# Patient Record
Sex: FEMALE | Race: WHITE | Employment: OTHER | URBAN - METROPOLITAN AREA
[De-identification: names, ages, dates, MRNs, and addresses within clinical notes are randomized per-mention and may not be internally consistent; named-entity substitution may affect disease eponyms.]

---

## 2021-11-07 ENCOUNTER — HOSPITAL ENCOUNTER (INPATIENT)
Age: 71
LOS: 5 days | Discharge: NURSING, REHAB OR LONG-TERM CARE | DRG: 473 | End: 2021-11-12
Attending: STUDENT IN AN ORGANIZED HEALTH CARE EDUCATION/TRAINING PROGRAM | Admitting: STUDENT IN AN ORGANIZED HEALTH CARE EDUCATION/TRAINING PROGRAM
Payer: MEDICARE

## 2021-11-07 ENCOUNTER — APPOINTMENT (OUTPATIENT)
Dept: GENERAL RADIOLOGY | Age: 71
DRG: 473 | End: 2021-11-07
Payer: MEDICARE

## 2021-11-07 DIAGNOSIS — R29.898 WEAKNESS OF BOTH LOWER EXTREMITIES: Primary | ICD-10-CM

## 2021-11-07 DIAGNOSIS — R26.2 UNABLE TO AMBULATE: ICD-10-CM

## 2021-11-07 PROBLEM — R53.1 GENERALIZED WEAKNESS: Status: ACTIVE | Noted: 2021-11-07

## 2021-11-07 LAB
ANION GAP SERPL CALCULATED.3IONS-SCNC: 13 MMOL/L (ref 3–16)
BACTERIA: ABNORMAL /HPF
BASOPHILS ABSOLUTE: 0.1 K/UL (ref 0–0.2)
BASOPHILS RELATIVE PERCENT: 0.7 %
BILIRUBIN URINE: NEGATIVE
BLOOD, URINE: NEGATIVE
BUN BLDV-MCNC: 18 MG/DL (ref 7–20)
CALCIUM SERPL-MCNC: 10 MG/DL (ref 8.3–10.6)
CHLORIDE BLD-SCNC: 100 MMOL/L (ref 99–110)
CLARITY: ABNORMAL
CO2: 26 MMOL/L (ref 21–32)
COLOR: YELLOW
CREAT SERPL-MCNC: 0.8 MG/DL (ref 0.6–1.2)
EOSINOPHILS ABSOLUTE: 0.1 K/UL (ref 0–0.6)
EOSINOPHILS RELATIVE PERCENT: 1.3 %
EPITHELIAL CELLS, UA: 4 /HPF (ref 0–5)
GFR AFRICAN AMERICAN: >60
GFR NON-AFRICAN AMERICAN: >60
GLUCOSE BLD-MCNC: 93 MG/DL (ref 70–99)
GLUCOSE URINE: NEGATIVE MG/DL
HCT VFR BLD CALC: 37.9 % (ref 36–48)
HEMOGLOBIN: 12.5 G/DL (ref 12–16)
HYALINE CASTS: 0 /LPF (ref 0–8)
KETONES, URINE: NEGATIVE MG/DL
LEUKOCYTE ESTERASE, URINE: NEGATIVE
LYMPHOCYTES ABSOLUTE: 3.3 K/UL (ref 1–5.1)
LYMPHOCYTES RELATIVE PERCENT: 37.6 %
MCH RBC QN AUTO: 29.6 PG (ref 26–34)
MCHC RBC AUTO-ENTMCNC: 33 G/DL (ref 31–36)
MCV RBC AUTO: 89.8 FL (ref 80–100)
MICROSCOPIC EXAMINATION: YES
MONOCYTES ABSOLUTE: 0.7 K/UL (ref 0–1.3)
MONOCYTES RELATIVE PERCENT: 7.8 %
NEUTROPHILS ABSOLUTE: 4.6 K/UL (ref 1.7–7.7)
NEUTROPHILS RELATIVE PERCENT: 52.6 %
NITRITE, URINE: NEGATIVE
PDW BLD-RTO: 13.8 % (ref 12.4–15.4)
PH UA: 7.5 (ref 5–8)
PLATELET # BLD: 373 K/UL (ref 135–450)
PMV BLD AUTO: 7.5 FL (ref 5–10.5)
POTASSIUM REFLEX MAGNESIUM: 4.3 MMOL/L (ref 3.5–5.1)
PROTEIN UA: NEGATIVE MG/DL
RBC # BLD: 4.23 M/UL (ref 4–5.2)
RBC UA: 1 /HPF (ref 0–4)
SODIUM BLD-SCNC: 139 MMOL/L (ref 136–145)
SPECIFIC GRAVITY UA: 1.01 (ref 1–1.03)
TROPONIN: <0.01 NG/ML
URINE REFLEX TO CULTURE: ABNORMAL
URINE TYPE: ABNORMAL
UROBILINOGEN, URINE: 0.2 E.U./DL
WBC # BLD: 8.8 K/UL (ref 4–11)
WBC UA: 1 /HPF (ref 0–5)

## 2021-11-07 PROCEDURE — 84484 ASSAY OF TROPONIN QUANT: CPT

## 2021-11-07 PROCEDURE — 85025 COMPLETE CBC W/AUTO DIFF WBC: CPT

## 2021-11-07 PROCEDURE — 1200000000 HC SEMI PRIVATE

## 2021-11-07 PROCEDURE — 93005 ELECTROCARDIOGRAM TRACING: CPT | Performed by: PHYSICIAN ASSISTANT

## 2021-11-07 PROCEDURE — 81001 URINALYSIS AUTO W/SCOPE: CPT

## 2021-11-07 PROCEDURE — 99284 EMERGENCY DEPT VISIT MOD MDM: CPT

## 2021-11-07 PROCEDURE — 80048 BASIC METABOLIC PNL TOTAL CA: CPT

## 2021-11-07 PROCEDURE — 71045 X-RAY EXAM CHEST 1 VIEW: CPT

## 2021-11-07 RX ORDER — POLYETHYLENE GLYCOL 3350 17 G/17G
17 POWDER, FOR SOLUTION ORAL DAILY PRN
Status: DISCONTINUED | OUTPATIENT
Start: 2021-11-07 | End: 2021-11-12

## 2021-11-07 RX ORDER — FENOFIBRIC ACID 135 MG/1
135 CAPSULE, DELAYED RELEASE ORAL DAILY
COMMUNITY
Start: 2021-09-28

## 2021-11-07 RX ORDER — DULOXETIN HYDROCHLORIDE 60 MG/1
60 CAPSULE, DELAYED RELEASE ORAL DAILY
Status: DISCONTINUED | OUTPATIENT
Start: 2021-11-08 | End: 2021-11-12 | Stop reason: HOSPADM

## 2021-11-07 RX ORDER — M-VIT,TX,IRON,MINS/CALC/FOLIC 27MG-0.4MG
1 TABLET ORAL DAILY
COMMUNITY

## 2021-11-07 RX ORDER — OXYBUTYNIN CHLORIDE 5 MG/1
10 TABLET ORAL NIGHTLY
Status: DISCONTINUED | OUTPATIENT
Start: 2021-11-08 | End: 2021-11-12 | Stop reason: HOSPADM

## 2021-11-07 RX ORDER — FENOFIBRATE 160 MG/1
160 TABLET ORAL DAILY
Status: DISCONTINUED | OUTPATIENT
Start: 2021-11-08 | End: 2021-11-12 | Stop reason: HOSPADM

## 2021-11-07 RX ORDER — RISPERIDONE 1 MG/1
1 TABLET, FILM COATED ORAL 2 TIMES DAILY
Status: DISCONTINUED | OUTPATIENT
Start: 2021-11-08 | End: 2021-11-12 | Stop reason: HOSPADM

## 2021-11-07 RX ORDER — ONDANSETRON 4 MG/1
4 TABLET, ORALLY DISINTEGRATING ORAL EVERY 8 HOURS PRN
Status: DISCONTINUED | OUTPATIENT
Start: 2021-11-07 | End: 2021-11-10 | Stop reason: SDUPTHER

## 2021-11-07 RX ORDER — CITALOPRAM 20 MG/1
20 TABLET ORAL DAILY
COMMUNITY

## 2021-11-07 RX ORDER — ONDANSETRON 2 MG/ML
4 INJECTION INTRAMUSCULAR; INTRAVENOUS EVERY 6 HOURS PRN
Status: DISCONTINUED | OUTPATIENT
Start: 2021-11-07 | End: 2021-11-10

## 2021-11-07 RX ORDER — SODIUM CHLORIDE 9 MG/ML
25 INJECTION, SOLUTION INTRAVENOUS PRN
Status: DISCONTINUED | OUTPATIENT
Start: 2021-11-07 | End: 2021-11-10 | Stop reason: SDUPTHER

## 2021-11-07 RX ORDER — SODIUM CHLORIDE 0.9 % (FLUSH) 0.9 %
5-40 SYRINGE (ML) INJECTION EVERY 12 HOURS SCHEDULED
Status: DISCONTINUED | OUTPATIENT
Start: 2021-11-08 | End: 2021-11-10 | Stop reason: SDUPTHER

## 2021-11-07 RX ORDER — RISPERIDONE 1 MG/1
1 TABLET, FILM COATED ORAL 2 TIMES DAILY
COMMUNITY

## 2021-11-07 RX ORDER — CITALOPRAM 10 MG/1
20 TABLET ORAL DAILY
Status: DISCONTINUED | OUTPATIENT
Start: 2021-11-08 | End: 2021-11-12 | Stop reason: HOSPADM

## 2021-11-07 RX ORDER — ACETAMINOPHEN 650 MG/1
650 SUPPOSITORY RECTAL EVERY 6 HOURS PRN
Status: DISCONTINUED | OUTPATIENT
Start: 2021-11-07 | End: 2021-11-12 | Stop reason: HOSPADM

## 2021-11-07 RX ORDER — OXYBUTYNIN CHLORIDE 5 MG/1
10 TABLET ORAL NIGHTLY
COMMUNITY
Start: 2021-07-27

## 2021-11-07 RX ORDER — ACETAMINOPHEN 325 MG/1
650 TABLET ORAL EVERY 6 HOURS PRN
Status: DISCONTINUED | OUTPATIENT
Start: 2021-11-07 | End: 2021-11-12 | Stop reason: HOSPADM

## 2021-11-07 RX ORDER — DOCUSATE SODIUM 100 MG/1
100 CAPSULE, LIQUID FILLED ORAL 2 TIMES DAILY
COMMUNITY

## 2021-11-07 RX ORDER — SODIUM CHLORIDE 0.9 % (FLUSH) 0.9 %
5-40 SYRINGE (ML) INJECTION PRN
Status: DISCONTINUED | OUTPATIENT
Start: 2021-11-07 | End: 2021-11-10 | Stop reason: SDUPTHER

## 2021-11-07 RX ORDER — DULOXETIN HYDROCHLORIDE 60 MG/1
60 CAPSULE, DELAYED RELEASE ORAL DAILY
COMMUNITY

## 2021-11-07 ASSESSMENT — ENCOUNTER SYMPTOMS
BACK PAIN: 0
SORE THROAT: 0
CHOKING: 0
SHORTNESS OF BREATH: 0
VOMITING: 0
EYE REDNESS: 0
APNEA: 0
ABDOMINAL PAIN: 0
FACIAL SWELLING: 0
EYE DISCHARGE: 0
NAUSEA: 0

## 2021-11-07 ASSESSMENT — PAIN SCALES - GENERAL: PAINLEVEL_OUTOF10: 0

## 2021-11-08 LAB
EKG ATRIAL RATE: 72 BPM
EKG DIAGNOSIS: NORMAL
EKG P AXIS: 77 DEGREES
EKG P-R INTERVAL: 148 MS
EKG Q-T INTERVAL: 390 MS
EKG QRS DURATION: 70 MS
EKG QTC CALCULATION (BAZETT): 427 MS
EKG R AXIS: 23 DEGREES
EKG T AXIS: 54 DEGREES
EKG VENTRICULAR RATE: 72 BPM

## 2021-11-08 PROCEDURE — 93010 ELECTROCARDIOGRAM REPORT: CPT | Performed by: INTERNAL MEDICINE

## 2021-11-08 PROCEDURE — 97530 THERAPEUTIC ACTIVITIES: CPT

## 2021-11-08 PROCEDURE — 6370000000 HC RX 637 (ALT 250 FOR IP): Performed by: NURSE PRACTITIONER

## 2021-11-08 PROCEDURE — 97166 OT EVAL MOD COMPLEX 45 MIN: CPT

## 2021-11-08 PROCEDURE — 2580000003 HC RX 258: Performed by: NURSE PRACTITIONER

## 2021-11-08 PROCEDURE — 6360000002 HC RX W HCPCS: Performed by: NURSE PRACTITIONER

## 2021-11-08 PROCEDURE — 97162 PT EVAL MOD COMPLEX 30 MIN: CPT

## 2021-11-08 PROCEDURE — 1200000000 HC SEMI PRIVATE

## 2021-11-08 RX ADMIN — ENOXAPARIN SODIUM 40 MG: 100 INJECTION SUBCUTANEOUS at 16:49

## 2021-11-08 RX ADMIN — OXYBUTYNIN CHLORIDE 10 MG: 5 TABLET ORAL at 20:43

## 2021-11-08 RX ADMIN — SODIUM CHLORIDE, PRESERVATIVE FREE 10 ML: 5 INJECTION INTRAVENOUS at 20:44

## 2021-11-08 RX ADMIN — SODIUM CHLORIDE, PRESERVATIVE FREE 10 ML: 5 INJECTION INTRAVENOUS at 16:49

## 2021-11-08 RX ADMIN — OXYBUTYNIN CHLORIDE 10 MG: 5 TABLET ORAL at 00:20

## 2021-11-08 ASSESSMENT — PAIN SCALES - GENERAL
PAINLEVEL_OUTOF10: 0
PAINLEVEL_OUTOF10: 0

## 2021-11-08 NOTE — ED TRIAGE NOTES
Increasing weakness over last few weeks family brought her in to see if they could get her into rehabilitation center to assist with weakness

## 2021-11-08 NOTE — FLOWSHEET NOTE
4 Eyes Skin Assessment     NAME:  Elidia Mckinney  YOB: 1950  MEDICAL RECORD NUMBER:  [id number]    The patient is being assess for  Admission    I agree that 2 RN's have performed a thorough Head to Toe Skin Assessment on the patient. ALL assessment sites listed below have been assessed. Areas assessed by both nurses:    Head, Face, Ears, Shoulders, Back, Chest, Arms, Elbows, Hands, Sacrum. Buttock, Coccyx, Ischium and Legs. Feet and Heels        Does the Patient have a Wound?  No noted wound(s)       Nicholas Prevention initiated:  Yes   Wound Care Orders initiated:  NA    Pressure Injury (Stage 3,4, Unstageable, DTI, NWPT, and Complex wounds) if present place consult order under [de-identified] NA    New and Established Ostomies if present place consult order under : NA      Nurse 1 eSignature: Electronically signed by Tod Cui RN on 11/8/21 at 6:04 PM EST    **SHARE this note so that the co-signing nurse is able to place an eSignature**    Nurse 2 eSignature: Electronically signed by Neeraj Smith RN on 11/8/21 at 6:05 PM EST

## 2021-11-08 NOTE — PROGRESS NOTES
Patient Diagnosis(es): The primary encounter diagnosis was Weakness of both lower extremities. A diagnosis of Unable to ambulate was also pertinent to this visit. has a past medical history of Depression, Hyperlipidemia, and Hypertension. has no past surgical history on file. Restrictions  Restrictions/Precautions  Restrictions/Precautions: Fall Risk  Position Activity Restriction  Other position/activity restrictions: Hx of CP     Vision/Hearing  Vision: Impaired  Vision Exceptions: Wears glasses at all times  Hearing: Exceptions to [facility]  Hearing Exceptions: Hard of hearing/hearing concerns; Bilateral hearing aid       Subjective  General  Chart Reviewed: Yes  Patient assessed for rehabilitation services?: Yes  Additional Pertinent Hx: Pt is a 73 y.o. female with hx of CP who presented to the ED on 11/7/21 with progressive generalized weakness and debility.   Response To Previous Treatment: Not applicable  Family / Caregiver Present: No  Referring Practitioner: CASTILLO Rosario CNP  Referral Date : 11/08/21  Diagnosis: Generalized weakness  Follows Commands: Within Functional Limits  Subjective  Subjective: Pt is agreeable to PT          Orientation  Orientation  Overall Orientation Status: Within Functional Limits     Social/Functional History  Social/Functional History  Lives With:  (sister and NICK)  Type of Home: House  Home Layout: Two level, Bed/Bath upstairs, 1/2 bath on main level (2 + basement)  Home Access: Stairs to enter without rails  Entrance Stairs - Number of Steps: 1 thru garage + flight upstairs to bedroom with one rail  Bathroom Shower/Tub: Walk-in shower, Shower chair with back (showers in basement)  Bathroom Toilet: Standard (uses towel bar to stand sometimes)  Bathroom Equipment: Shower chair  Bathroom Accessibility: Accessible  Home Equipment: Rolling walker, [employer]  ADL Assistance: Independent  Homemaking Assistance:  (microwave meals, family assist with heaving cleaning and laundry)  Ambulation Assistance: Independent (RW in home, w/c in community (sister pushes w/c))  Transfer Assistance: Independent  Additional Comments: Sister works during the day and pt home alone.  Pt denies falls     Cognition   Cognition  Overall Cognitive Status: WFL    Objective  AROM RLE (degrees)  RLE AROM: Exceptions  RLE General AROM: ankles unable to obtain neutral position  AROM LLE (degrees)  LLE AROM : Exceptions  LLE General AROM: ankles unable to obtain neutral position  Strength RLE  Strength RLE: Exception  Comment: mild to moderate generalized weakness  Strength LLE  Strength LLE: Exception  Comment: mild to moderate generalized weakness  Motor Control  Gross Motor?: WFL     Bed mobility  Supine to Sit: Moderate assistance (for trunk)  Sit to Supine: Maximum assistance  Transfers  Sit to Stand: Minimal Assistance  Stand to sit: Minimal Assistance  Comment: from EOB and chair  Ambulation  Ambulation?: Yes  Ambulation 1  Surface: level tile  Device: Rolling Walker  Assistance: Minimal assistance  Gait Deviations: Slow Patsy; Decreased step length; Decreased step height  Distance: 5' x 2  Comments: Pt reporting max distance  Stairs/Curb  Stairs?: No     Balance  Posture: Fair  Sitting - Static: Good  Sitting - Dynamic: Good  Standing - Static: Fair (@RW)  Standing - Dynamic: Fair (@RW)        Plan   Plan  Times per week: 3-5x/week  Current Treatment Recommendations: Strengthening, ADL/Self-care Training, ROM, Balance Training, Functional Mobility Training, Transfer Training, Neuromuscular Re-education, Gait Training, Patient/Caregiver Education & Training, Equipment Evaluation, Education, & procurement, Safety Education & Training  Safety Devices  Type of devices: Call light within reach, Gait belt, Left in bed (pt in ED stretcher)      AM-PAC Score  AM-PAC Inpatient Mobility Raw Score : 12 (11/08/21 1215)  AM-PAC Inpatient T-Scale Score : 35.33 (11/08/21 1215)  Mobility Inpatient CMS 0-100% Score: 68.66 (11/08/21 1215)  Mobility Inpatient CMS G-Code Modifier : CL (11/08/21 1215)          Goals  Short term goals  Time Frame for Short term goals: by acute discharge  Short term goal 1: bed mobility with min A  Short term goal 2: sit<>Stand with CGA  Short term goal 3: ambulate > 20' with RW and SBA  Patient Goals   Patient goals : none stated       Therapy Time   Individual Concurrent Group Co-treatment   Time In 1135         Time Out 1215         Minutes 40         Timed Code Treatment Minutes: 25 Minutes       Terrie Patel, PT

## 2021-11-08 NOTE — ACP (ADVANCE CARE PLANNING)
Advance Care Planning     Advance Care Planning Activator (Inpatient)  Conversation Note      Date of ACP Conversation: 11/8/2021     Conversation Conducted with: Patient with Decision Making Capacity    ACP Activator: [address] [facility] Decision Maker:     Current Designated Health Care Decision Maker:     Primary Decision Maker: Eric Rosales - Brother/Sister - [phone]    Today we documented Decision Maker(s) consistent with Legal Next of Kin hierarchy. Care Preferences    Ventilation: \"If you were in your present state of health and suddenly became very ill and were unable to breathe on your own, what would your preference be about the use of a ventilator (breathing machine) if it were available to you? \"      Would the patient desire the use of ventilator (breathing machine)?: yes    \"If your health worsens and it becomes clear that your chance of recovery is unlikely, what would your preference be about the use of a ventilator (breathing machine) if it were available to you? \"     Would the patient desire the use of ventilator (breathing machine)?: No      Resuscitation  \"CPR works best to restart the heart when there is a sudden event, like a heart attack, in someone who is otherwise healthy. Unfortunately, CPR does not typically restart the heart for people who have serious health conditions or who are very sick. \"    \"In the event your heart stopped as a result of an underlying serious health condition, would you want attempts to be made to restart your heart (answer \"yes\" for attempt to resuscitate) or would you prefer a natural death (answer \"no\" for do not attempt to resuscitate)? \" yes       [x] Yes   [] No   Educated Patient / Mayank Sher regarding differences between Advance Directives and portable DNR orders.     Length of ACP Conversation in minutes:  5 minutes    Conversation Outcomes:  [x] ACP discussion completed  [] Existing advance directive reviewed with patient; no changes to patient's previously recorded wishes  [] New Advance Directive completed  [] Portable Do Not Rescitate prepared for Provider review and signature  [] POLST/POST/MOLST/MOST prepared for Provider review and signature      Follow-up plan:    [] Schedule follow-up conversation to continue planning  [] Referred individual to Provider for additional questions/concerns   [] Advised patient/agent/surrogate to review completed ACP document and update if needed with changes in condition, patient preferences or care setting    [x] This note routed to one or more involved healthcare providers

## 2021-11-08 NOTE — CARE COORDINATION
INITIAL CASE MANAGEMENT ASSESSMENT    Reviewed chart, met with patient to assess possible discharge needs. Explained Case Management role/services. Living Situation: Patient lives in a house with her sister Josiah Hooker and her brother in law. Patient mostly enters through the garage. Here is 1 [address]. ADLs: [place] varies- Patient can dress herself, and make food in the microwave. She is unable to do housework. DME: [facility] and Wheelchair    PT/OT Recs: TBD     Active Services: None     Transportation: Non active - Patients sisters, brother, or brother in law helps transport when needed. Medical transportation will be needed when medically ready. Medications: [facility] order. [facility] or [facility] for short term prescriptions. PCP: Li Garza MD      HD/PD: N/A    PLAN/COMMENTS: Patient is to go to [facility] rehab and then back home once her rehab stay is complete. The Plan for Transition of Care is related to the following treatment goals: [facility]- then go home once completed. The Patient was provided with a choice of provider and agrees   with the discharge plan. [x] Yes [] No    Freedom of choice list was provided with basic dialogue that supports the patient's individualized plan of care/goals, treatment preferences and shares the quality data associated with the providers. [x] Yes [] No    SW/CM provided contact information for patient or family to call with any questions. SW/CM will follow and assist as needed.     ACP Complete

## 2021-11-08 NOTE — ED PROVIDER NOTES
**ADVANCED PRACTICE PROVIDER, I HAVE EVALUATED THIS PATIENT**        [address] [facility] ENCOUNTER      Pt Name: Zoe Teran  VTD:[id number]  Yaniragfemilie 1950  Date of evaluation: 11/7/2021  Provider: Helene Kayser, PA-C      Chief Complaint:    Chief Complaint   Patient presents with    Fatigue     generalized weakness for the past week, states cannot get up to walk anymore with her walker, pt does have CP.  was recommended per PCP for inpatient therapy, they spoke with [facility] nursing and was told she needs a 3 nights admission prior to in pt admission d/t insurance          Nursing Notes, Past Medical Hx, Past Surgical Hx, Social Hx, Allergies, and Family Hx were all reviewed and agreed with or any disagreements were addressed in the HPI.    HPI: (Location, Duration, Timing, Severity, Quality, Assoc Sx, Context, Modifying factors)    This is a  73 y.o. female who presents to the emergency room with chief complaint of generalized weakness. Patient states in the last week she just been getting weaker and weaker. To the point she cannot get herself out of a chair. Person that helps her said that she is so weak that she cannot help her get into bed anymore. She did talk to the primary care physician who states that she will probably have to be placed in a skilled nursing home. The caretaker that is with her said they tried to call [address] [place] [address] and they told her that she went to come to the emergency room and be admitted for 3 days. And that is why she is here today. Patient denies fall. Denies chest pain, no abdominal pain. Denies pain with urination. No headaches. No fevers. No cough. No other complaints. PastMedical/Surgical History:      Diagnosis Date    Depression     Hyperlipidemia     Hypertension      No past surgical history on file.     Medications:  Previous Medications    No medications on file         Review of Systems:  (2-9 systems needed)  Review of Systems   Constitutional: Positive for fatigue. Negative for chills and fever. HENT: Negative for congestion, facial swelling and sore throat. Eyes: Negative for discharge and redness. Respiratory: Negative for apnea, choking and shortness of breath. Cardiovascular: Negative for chest pain. Gastrointestinal: Negative for abdominal pain, nausea and vomiting. Genitourinary: Negative for dysuria. Musculoskeletal: Negative for back pain, neck pain and neck stiffness. Neurological: Positive for weakness. Negative for dizziness, tremors, seizures and headaches. All other systems reviewed and are negative. \"Positives and Pertinent negatives as per HPI\"    Physical Exam:  Physical Exam  Constitutional:       Appearance: She is well-developed. She is not diaphoretic. HENT:      Head: Normocephalic and atraumatic. Nose: Nose normal.      Mouth/Throat:      Mouth: Mucous membranes are moist.      Pharynx: Oropharynx is clear. Eyes:      General:         Right eye: No discharge. Left eye: No discharge. Extraocular Movements: Extraocular movements intact. Conjunctiva/sclera: Conjunctivae normal.      Pupils: Pupils are equal, round, and reactive to light. Cardiovascular:      Rate and Rhythm: Normal rate and regular rhythm. Heart sounds: Normal heart sounds. No murmur heard. No friction rub. No gallop. Pulmonary:      Effort: Pulmonary effort is normal. No respiratory distress. Breath sounds: Normal breath sounds. No wheezing or rales. Chest:      Chest wall: No tenderness. Abdominal:      General: Abdomen is flat. Bowel sounds are normal. There is no distension. Palpations: Abdomen is soft. There is no mass. Tenderness: There is no abdominal tenderness. There is no guarding or rebound. Musculoskeletal:         General: Normal range of motion. Cervical back: Normal range of motion and neck supple. Skin:     General: Skin is warm and dry. Neurological:      General: No focal deficit present. Mental Status: She is alert and oriented to person, place, and time. Psychiatric:         Behavior: Behavior normal.         MEDICAL DECISION MAKING    Vitals:    Vitals:    11/07/21 1636 11/07/21 2000 11/07/21 2100   BP: 133/74 (!) 175/81    Pulse: 77     Resp: 17     Temp: 96.9 °F (36.1 °C)     SpO2: 100% 96% 97%       LABS:  Labs Reviewed   URINE RT REFLEX TO CULTURE - Abnormal; Notable for the following components:       Result Value    Clarity, UA CLOUDY (*)     All other components within normal limits    Narrative:     Performed at:  [place] [address]   Phone [phone]   MICROSCOPIC URINALYSIS - Abnormal; Notable for the following components:    Bacteria, UA 1+ (*)     All other components within normal limits    Narrative:     Performed at:  [address] [address]   Phone [phone]   CBC WITH AUTO DIFFERENTIAL    Narrative:     Performed at:  [address] [address]   Phone [phone]   BASIC METABOLIC PANEL W/ REFLEX TO MG FOR LOW K    Narrative:     Performed at:  [address] [address]   Phone [phone]   TROPONIN    Narrative:     Performed at:  [facility]  [address] [place] [address]   Phone [phone] of labs reviewed and were negative at this time or not returned at the time of this note.     RADIOLOGY:   Non-plain film images such as CT, Ultrasound and MRI are read by the radiologist. Jeffrey Muhammad PA-C have directly visualized the radiologic plain film image(s) with the below findings:      Interpretation per the Radiologist below, if available at the time of this note:    XR CHEST PORTABLE   Final Result   No airspace disease by radiograph. XR CHEST PORTABLE    Result Date: 11/7/2021  EXAMINATION: ONE XRAY VIEW OF THE CHEST 11/7/2021 8:10 pm COMPARISON: None. HISTORY: ORDERING SYSTEM PROVIDED HISTORY: Weakness TECHNOLOGIST PROVIDED HISTORY: Reason for exam:->Weakness Reason for Exam: weakness; fatigue Acuity: Acute Type of Exam: Initial FINDINGS: Cardiac silhouette is within normal limits in size. Mediastinal contours are otherwise suboptimally evaluated due to rotated projection. Lungs demonstrate no focal consolidation or pleural effusion. No pneumothorax appreciated on this projection. Scattered atelectasis noted. No airspace disease by radiograph. MEDICAL DECISION MAKING / ED COURSE:      PROCEDURES:   Procedures    None    Patient was given:  Medications - No data to display    Emergency room course: Patient on exam pupils are equal round and reactive to light extraocular movement is intact. Throat is clear. She has no facial drooping. No slurred speech. Tongue and smile does not deviate. Neck is supple full range of motion without tenderness. No midline tenderness cervical, thoracic or lumbar spine. Cardiovascular regular rhythm, lungs are clear. No wheeze rales or rhonchi noted. No chest wall tenderness with palpation. Abdomen is soft nontender. Nondistended. Normal bowel sounds all 4 quadrant. Pelvic stable anterior lateral compression. She has good strength against resisted plantar dorsiflexion.  strength 5+ equal bilaterally. Alert oriented x4. Does not appear to be in acute distress. Lab results from today shows:  Urinalysis negative leukocytes, negative for nitrates, negative for blood, negative ketones. Microscopically unremarkab. CBC within normal limits with a white count of 8.8. BMP unremarkable. Troponin less than 0.01. Chest x-ray shows no airspace disease.     Discussed lab results and chest x-ray results with patient. Discussed admission plan. I will place a call out to our hospitalist service for admission through [facility]. The patient tolerated their visit well. I evaluated the patient. The physician was available for consultation as needed. The patient and / or the family were informed of the results of any tests, a time was given to answer questions, a plan was proposed and they agreed with plan. CLINICAL IMPRESSION:  1. Weakness of both lower extremities    2. Unable to ambulate        DISPOSITION  DISPOSITION Decision To Admit 11/07/2021 10:10:59 PM        PATIENT REFERRED TO:  No follow-up provider specified.     DISCHARGE MEDICATIONS:  New Prescriptions    No medications on file       DISCONTINUED MEDICATIONS:  Discontinued Medications    No medications on file              (Please note the MDM and HPI sections of this note were completed with a voice recognition program.  Efforts were made to edit the dictations but occasionally words are mis-transcribed.)    Electronically signed, Priya Stoll PA-C,          Priya Stoll PA-C  11/07/21 1234

## 2021-11-08 NOTE — ED PROVIDER NOTES
EKG Interpretation    Interpreted by emergency department physician  Time performed: 1951  Time read: 1953    Rhythm: Sinus  Ventricular Rate: 72  QRS Axis: 23  Ectopy: None  Conduction: Normal sinus rhythm  ST Segments: normal  T Waves: normal  Q Waves: None    Other findings: Motion artifact make it difficult to read EKG    Compared to EKG on: None to compare    Clinical Impression: Normal sinus rhythm, normal EKG. There is motion artifact make it difficult to read EKG. There is no old EKG to compare.     [address], [facility], [facility]  11/07/21 2021

## 2021-11-08 NOTE — ED NOTES
Bed: C-21  Expected date: 11/7/21  Expected time: 7:05 PM  Means of arrival: Walk In  Comments:  Aleshia Guillaume RN  11/07/21 3744

## 2021-11-08 NOTE — CONSULTS
Neurology Consult Note  Reason for Consult: lower extremity weakness, new onset    Chief complaint: \"weakness\"    Dr Carson Urruita MD asked me to see Dimitri Prieto in consultation for evaluation of lower extremity weakness, new onset    History of Present Illness:  I obtained my information via the patient, supplemented with chart review. Dimitri Prieto is a 73 y.o. female with hx of cerebral palsy who presented to the ED on 11/7/21 for evaluation of generalized weakness. Per my encounter the patient reports that for the last week she has had persistent not entirely sure if progressive or sudden weakness as she gives conflicting answers. At baseline she is able to walk very slowly with a walker. She reports her weakness is most noticable when trying to go from sitting to standing as she does not have enough strength in her arms nor legs to push herself up off of the chair. She denies any falls. She reports chronic left sided weakness 2/2 her cerebral palsy and she broke her RLE 2.5 years ago with residual weakness. Nothing seems to make her symptoms better or worse. Denies any fatigue, fevers, chills. She reports good appetite. Denies any vision changes, difficulty swallowing, speech changes. She reports full sensation of her arms and legs. No neck, or back pain. No headache. No shortness of breath. Nursing had reported increased tone as possibly new. Per patient has had some chronic increased tone. On chart review the patients sister for which whom she lives with had reported that the patients PCP had recommend inpatient rehab. They were working with [address] [place] [address] for which there were reports that she needed 3 nights hospital admission prior to admission due to insurance difficulty. Today the patient reports her weakness is the same. While working with  therapy today  she was unable to lift herself up off of the mattress as they were changing the sheets.      No recent medication changes. She has not had weakness like this before. On chart review patient was recently seen by her PCP for which urine dip was concerning for bacterial UTI with positive nitrates and small leukocyte esterase, elevated CRP and ESR. She was started on antibiotic treatment for which per chart review of last OP encounter on 11/5 her weakness was reported as improving. Medical History:  Past Medical History:   Diagnosis Date    Depression     Hyperlipidemia     Hypertension      History reviewed. No pertinent surgical history. Scheduled Meds:   fenofibrate  160 mg Oral Daily    oxybutynin  10 mg Oral Nightly    sodium chloride flush  5-40 mL IntraVENous 2 times per day    enoxaparin  40 mg SubCUTAneous Daily    [Held by provider] DULoxetine  60 mg Oral Daily    [Held by provider] citalopram  20 mg Oral Daily    [Held by provider] risperiDONE  1 mg Oral BID     Continuous Infusions:   sodium chloride       PRN Meds:.sodium chloride flush, sodium chloride, ondansetron **OR** ondansetron, polyethylene glycol, acetaminophen **OR** acetaminophen    Not in a hospital admission. Allergies   Allergen Reactions    Atorvastatin Other (See Comments)     cpk over  1100 and only expalnation statins     Sulfur Other (See Comments)       History reviewed. No pertinent family history. Social History     Tobacco Use   Smoking Status Not on file   Smokeless Tobacco Not on file     Social History     Substance and Sexual Activity   Drug Use Not on file     Social History     Substance and Sexual Activity   Alcohol Use None       ROS:  Constitutional- No weight loss or fevers  Eyes- No diplopia. No photophobia. +chronis dysconjugate gaze. Ears/nose/throat- No dysphagia. No Dysarthria  Cardiovascular- No palpitations. No chest pain  Respiratory- No shortness of breath. No Cough  Gastrointestinal- No Abdominal pain. No Vomiting. Genitourinary- No incontinence.  No urinary retention  Musculoskeletal- No myalgia. No arthralgia  Skin- No rash. +easy bruising. Psychiatric- + depression. No anxiety  Endocrine- No diabetes. No thyroid issues. Hematologic- No bleeding difficulty. No fatigue  Neurologic- +generalized weakness. No Headache. Exam:  Vitals:    11/07/21 2351 11/08/21 0158 11/08/21 0300 11/08/21 1400   BP: (!) 151/82 (!) 133/59 (!) 142/68 132/75   Pulse: 79 80 78 72   Resp: 20 16 18 18   Temp:    97.8 °F (36.6 °C)   TempSrc:    Oral   SpO2: 96% 97% 95% 98%      Constitutional    Vital signs: BP, HR, and RR reviewed   General Alert, no distress, well-nourished  Eyes: unable to visualize the fundi  Cardiovascular: pulses symmetric in all 4 extremities. No peripheral edema. Psychiatric: cooperative with examination, no  psychotic behavior noted. Neurologic  Mental status:   orientation to person, place, time and situation   General fund of knowledge:  grossly intact   Memory: Short term and long term intact   Attention intact as able to attend well to the exam     Language fluent in conversation. No aphasia. Comprehension intact; follows simple commands. Cranial nerves:   CN2: Visual Fields full w/o extinction on confrontational testing,   CN 3,4,6: Dysconjugate primary gaze-left eye exotropic. Incomplete abduction bilaterally. PERRLA @ 2mm. CN5: V1: V2: V3: intact to pinprick sensation bilaterally  CN7: upper and lower facial symmetric. Speech is dysarthric, she has poor fitting dentures. CN8: hearing grossly intact to conversation. CN9/10: palate elevated symmetrically  CN11: trap full strength symmetric shoulder shrug bilaterally  CN12: tongue with mild right deviation. Strength: Grasp: BUE 5/5 . RUE: 5/5 Shoulder abduction, elbow flexion, elbow extension. LUE: 5/5  Shoulder abduction, elbow flexion, elbow extension. Dorsiflexion: R 3/5, L 2/5 ;  Plantar flexion: R 3/5, L foot drop. RLE: 5/5 Hip flexion, Knee flexion, Knee extension.  LLE: 5/5 Hip flexion, Knee flexion, Knee extension. Deep tendon reflexes:   Brisk, symmetric throughout. +hoffmans pincher response in BUE. BLE with flicker upwards, not definitive +babinski. Sensory: light touch and pinprick intact in all 4 extremities. No sensory extinction on double simultaneous stimulation  Cerebellar/coordination: finger nose finger normal without ataxia. Tone: Increased throughout. Worse in BLE compared to BUE. Gait: deferred for safety. Labs  Na: 139  K: 4.3  BUN: 18  Creatinine: 0.8  Glucose: 93  Calcium: 10.0    HbA1c:    WBC: 8.8  RBC: 4.23  Hgb: 12.5  Hct: 37.9  Platelet: 111    UA: negative for nitrites and leukocyte esterase. 1+ bacteria. No reflex to culture. Studies  No Neuroimaging on file for this admission. Chest Xray 11/7/21: No airspace disease by radiograph. Impression  1. Reported generalized weakness   2. Impaired mobility  3. Hypertonia. 4. Recent UTI  5. Cerebral Palsy    Kirk Chilel is a 73 y.o. female with hx of cerebral palsy, with chronic gait impairment who presents with 1 week history of generalized weakness, worsening impaired mobility as unable to stand. Recently was seen by PCP for which work up was revealing for UTI, elevated inflammatory marks possibly 2/2 to the above. She was started on antibiotic therapy. Etiology: Possibly decompensation in setting of recent UTI. There was concern that her hypertonia may be new for which cannot fully exclude an acute central lesion, though possibly could be explained by patients known CP. Recommendations  - MRI neuro axis is pending. Will follow along for results and comment accordingly  - Infectious and metabolic work up per primary team.   - PT, OT.  - Will discuss with Neurology attending.        Chon Joyner, [address] Neurology    A copy of this note was provided for Dr Joon Prasad MD

## 2021-11-08 NOTE — PROGRESS NOTES
Occupational Therapy   Occupational Therapy Initial Assessment  Date: 2021   Patient Name: Zoe Teran  MRN: [id number]     : 1950    Date of Service: 2021    Discharge Recommendations:  Patient would benefit from continued therapy after discharge, 3-5 sessions per week  OT Equipment Recommendations  Other: defer to [address] scored a 15/24 on the AM-PAC ADL Inpatient form. Current research shows that an AM-PAC score of 17 or less is typically not associated with a discharge to the patient's home setting. Based on the patient's AM-PAC score and their current ADL deficits, it is recommended that the patient have 3-5 sessions per week of Occupational Therapy at d/c to increase the patient's independence. Please see assessment section for further patient specific details. If patient discharges prior to next session this note will serve as a discharge summary. Please see below for the latest assessment towards goals. Assessment   Performance deficits / Impairments: Decreased functional mobility ; Decreased strength; Decreased ADL status; Decreased safe awareness; Decreased endurance; Decreased balance; Decreased high-level IADLs  Assessment: 72 y/o female presented to ED 2021 with generalized weakness. Pt with history of CP. PTA pt lives at home with family and was independent with ADLs and functional mobility with RW. Today, pt prequired mod/max A for bed mobility and min A to ambulate ~ 5 feet with RW. Pt with functional UE ROM for self care and anticipate pt will require up to max A for LB ADls. Pt is functioning below baseline and benefit from skilled therapy.   Prognosis: Good  Decision Making: Low Complexity  OT Education: Plan of Care; OT Role; Transfer Training  REQUIRES OT FOLLOW UP: Yes  Safety Devices  Safety Devices in place: Yes  Type of devices: Left in bed; Call light within reach; Gait belt; Nurse notified           Patient Diagnosis(es): The primary encounter diagnosis was Weakness of both lower extremities. A diagnosis of Unable to ambulate was also pertinent to this visit. has a past medical history of Depression, Hyperlipidemia, and Hypertension. has no past surgical history on file. Restrictions  Restrictions/Precautions  Restrictions/Precautions: Fall Risk  Position Activity Restriction  Other position/activity restrictions: Hx of CP    Subjective   General  Chart Reviewed: Yes  Patient assessed for rehabilitation services?: Yes  Additional Pertinent Hx: 74 y/o female admitted 11/7/2021 with progressive generalized weakness . Progression of cerebral palsy, balance issues. No evidence of infection. No evidence of neurological derangement. No electrolyte derangement. Family / Caregiver Present: No  Referring Practitioner: Dr. Ruano Credit: Pt seen bedside and agreeable to therapy. Pt denied pain. General Comment  Comments: Per RN ok for therapy. Social/Functional History  Social/Functional History  Lives With:  (sister and NICK)  Type of Home: House  Home Layout: Two level, Bed/Bath upstairs, 1/2 bath on main level (2 + basement)  Home Access: Stairs to enter without rails  Entrance Stairs - Number of Steps: 1 thru garage + flight upstairs to bedroom with one rail  Bathroom Shower/Tub: Walk-in shower, Shower chair with back (showers in basement)  Bathroom Toilet: Standard (uses towel bar to stand sometimes)  Bathroom Equipment: Shower chair  Bathroom Accessibility: Accessible  Home Equipment: Rolling walker, [employer]  ADL Assistance: Independent  Homemaking Assistance:  (microwave meals, family assist with heaving cleaning and laundry)  Ambulation Assistance: Independent (RW in home, w/c in community (sister pushes w/c))  Transfer Assistance: Independent  Additional Comments: Sister works during the day and pt home alone.  Pt denies falls       Objective   Vision: Impaired (L eye wanders d/t CP but pt able to see out of it)  Vision Exceptions: Wears glasses at all times  Hearing: Exceptions to [facility]  Hearing Exceptions: Hard of hearing/hearing concerns; Bilateral hearing aid    Orientation  Overall Orientation Status: Within Functional Limits     Balance  Sitting Balance: Contact guard assistance (EOB in prep for transfers)  Standing Balance: Minimal assistance  Standing Balance  Time: stood prn prior to ambulation  Functional Mobility  Functional Mobility Comments: few steps stretcher <> bedside chair with Rw and min A     ADL  Additional Comments: Anticipate pt will require max A for LB bathing/dressing and toileting, min A for UB bathing/dressing and grooming based on balance and endurance observed        Bed mobility  Supine to Sit: Moderate assistance (for trunk)  Sit to Supine: Maximum assistance  Transfers  Sit to stand: Minimal assistance; Moderate assistance  Stand to sit: Minimal assistance; Moderate assistance  Transfer Comments: to/from RW     Cognition  Overall Cognitive Status: WFL  Perception  Overall Perceptual Status: WFL              LUE AROM (degrees)  LUE General AROM: grossly functional  RUE AROM (degrees)  RUE General AROM: grossly functional          Plan   Plan  Times per week: 3-5  Current Treatment Recommendations: Strengthening, Functional Mobility Training, Gait Training, Endurance Training, Balance Training, ROM, Self-Care / ADL    AM-PAC Score  AM-[facility] Inpatient Daily Activity Raw Score: 15 (11/08/21 1226)  AM-PAC Inpatient ADL T-Scale Score : 34.69 (11/08/21 1226)  ADL Inpatient CMS 0-100% Score: 56.46 (11/08/21 1226)  ADL Inpatient CMS G-Code Modifier : CK (11/08/21 1226)    Goals  Short term goals  Time Frame for Short term goals: Prior to DC:   Short term goal 1: Pt will complete ADL transfers with CGA  Short term goal 2: Pt will complete functional mobility with CGA  Short term goal 3: Pt will complete toileting with SBA  Short term goal 4: Pt will complete LB dressing with SBA  Short term goal 5: Pt will tolerate standing > 5 min for functional task with SBA  Patient Goals   Patient goals : to get stronger       Therapy Time   Individual Concurrent Group Co-treatment   Time In 1135         Time Out 1215         Minutes 40         Timed Code Treatment Minutes: 25 Minutes     This note to serve as OT d/c summary if pt is d/c-ed prior to next therapy session.     Justin Schultz, OTR/L

## 2021-11-08 NOTE — FLOWSHEET NOTE
Patient admitted to room 4258 from ED. Oriented to room, call light, and floor policies. Plan of care reviewed with patient/family. Pt is resting in bed and denies pain at this time; no s/s of distress noted. Assessment completed; VSS. Safety precautions in place; call light and bedside table within reach. Pt encouraged to call for needs or ambulation. Pt VU. Will continue to monitor.   Electronically signed by Vicky Dalton RN on 11/8/2021 at 6:05 PM

## 2021-11-08 NOTE — PROGRESS NOTES
Hospitalist Progress Note      PCP: Eliot Carrillo MD    Date of Admission: 11/7/2021    Chief Complaint: weakness    Hospital Course:     Subjective: states in past two weeks, has had inability to get out of chair to walk which is new       Medications:  Reviewed    Infusion Medications    sodium chloride       Scheduled Medications    fenofibrate  160 mg Oral Daily    oxybutynin  10 mg Oral Nightly    sodium chloride flush  5-40 mL IntraVENous 2 times per day    enoxaparin  40 mg SubCUTAneous Daily    [Held by provider] DULoxetine  60 mg Oral Daily    [Held by provider] citalopram  20 mg Oral Daily    [Held by provider] risperiDONE  1 mg Oral BID     PRN Meds: sodium chloride flush, sodium chloride, ondansetron **OR** ondansetron, polyethylene glycol, acetaminophen **OR** acetaminophen    No intake or output data in the 24 hours ending 11/08/21 1058    Exam:    BP (!) 142/68   Pulse 78   Temp 96.9 °F (36.1 °C)   Resp 18   SpO2 95%     General appearance: No apparent distress, appears stated age and cooperative. HEENT: Pupils equal, round, and reactive to light. Conjunctivae/corneas clear. Neck: Supple, with full range of motion. No jugular venous distention. Trachea midline. Respiratory:  Normal respiratory effort. Clear to auscultation, bilaterally without Rales/Wheezes/Rhonchi. Cardiovascular: Regular rate and rhythm with normal S1/S2 without murmurs, rubs or gallops. Abdomen: Soft, non-tender, non-distended with normal bowel sounds. Musculoskeletal: No clubbing, cyanosis or edema bilaterally. Full range of motion without deformity. Skin: Skin color, texture, turgor normal.  No rashes or lesions. Neurologic:  Neurovascularly intact without any focal sensory/motor deficits.  Cranial nerves: II-XII intact, grossly non-focal.  Psychiatric: Alert and oriented, thought content appropriate, normal insight  Capillary Refill: Brisk,< 3 seconds   Peripheral Pulses: +2 palpable, equal bilaterally       Labs:   Recent Labs     11/07/21  1645   WBC 8.8   HGB 12.5   HCT 37.9        Recent Labs     11/07/21  1645      K 4.3      CO2 26   BUN 18   CREATININE 0.8   CALCIUM 10.0     No results for input(s): AST, ALT, BILIDIR, BILITOT, ALKPHOS in the last 72 hours. No results for input(s): INR in the last 72 hours. Recent Labs     11/07/21  1645   TROPONINI <0.01       Urinalysis:      Lab Results   Component Value Date    NITRU Negative 11/07/2021    WBCUA 1 11/07/2021    BACTERIA 1+ 11/07/2021    RBCUA 1 11/07/2021    BLOODU Negative 11/07/2021    SPECGRAV 1.011 11/07/2021    GLUCOSEU Negative 11/07/2021       Radiology:  XR CHEST PORTABLE   Final Result   No airspace disease by radiograph. Assessment/Plan:    Active Hospital Problems    Diagnosis Date Noted    Generalized weakness [R53.1] 11/07/2021       Generalized weakness & debility: Progression of cerebral palsy, balance issues. No evidence of infection. No electrolyte derangement. Obtain MRI spine and brain to r/o central nervous process  Neuro consulted       Depression: Holding Risperdal, Cymbalta and Celexa for now to make sure that this is not potentially contributing to an accelerated generalized weakness. Resume when indicated.       DVT Prophylaxis: Lovenox  Diet: ADULT DIET;  Regular  Code Status: Full Code    PT/OT Eval Status: SNF    Dispo - Elpidio Terrazas MD

## 2021-11-08 NOTE — PROGRESS NOTES
Medication Reconciliation     List of medications patient is currently taking is complete. Source of information:   1. Conversation with patient at bedside  2. EPIC records        Notes regarding home medications:  1. Patient received all of her home medications today. 2. Pt recently completed therapy for UTI on Tuesday. At same time therapy was started for UTI her PCP d/c the simvastatin.   Denies other otc/herbal use  Ivania Saeed, Pharmacy Intern 11/7/2021 10:27 PM

## 2021-11-09 ENCOUNTER — ANESTHESIA EVENT (OUTPATIENT)
Dept: OPERATING ROOM | Age: 71
DRG: 473 | End: 2021-11-09
Payer: MEDICARE

## 2021-11-09 ENCOUNTER — APPOINTMENT (OUTPATIENT)
Dept: MRI IMAGING | Age: 71
DRG: 473 | End: 2021-11-09
Payer: MEDICARE

## 2021-11-09 LAB
APTT: 39 SEC (ref 26.2–38.6)
INR BLD: 1.06 (ref 0.88–1.12)
PROTHROMBIN TIME: 12 SEC (ref 9.9–12.7)
SARS-COV-2, NAAT: NOT DETECTED

## 2021-11-09 PROCEDURE — 97530 THERAPEUTIC ACTIVITIES: CPT

## 2021-11-09 PROCEDURE — 85610 PROTHROMBIN TIME: CPT

## 2021-11-09 PROCEDURE — 85730 THROMBOPLASTIN TIME PARTIAL: CPT

## 2021-11-09 PROCEDURE — 87635 SARS-COV-2 COVID-19 AMP PRB: CPT

## 2021-11-09 PROCEDURE — 36415 COLL VENOUS BLD VENIPUNCTURE: CPT

## 2021-11-09 PROCEDURE — 72148 MRI LUMBAR SPINE W/O DYE: CPT

## 2021-11-09 PROCEDURE — 2580000003 HC RX 258: Performed by: NURSE PRACTITIONER

## 2021-11-09 PROCEDURE — 6360000002 HC RX W HCPCS: Performed by: NURSE PRACTITIONER

## 2021-11-09 PROCEDURE — 72146 MRI CHEST SPINE W/O DYE: CPT

## 2021-11-09 PROCEDURE — 72141 MRI NECK SPINE W/O DYE: CPT

## 2021-11-09 PROCEDURE — 1200000000 HC SEMI PRIVATE

## 2021-11-09 PROCEDURE — 70551 MRI BRAIN STEM W/O DYE: CPT

## 2021-11-09 PROCEDURE — 6370000000 HC RX 637 (ALT 250 FOR IP): Performed by: NURSE PRACTITIONER

## 2021-11-09 RX ORDER — SODIUM CHLORIDE 9 MG/ML
INJECTION, SOLUTION INTRAVENOUS CONTINUOUS
Status: CANCELLED | OUTPATIENT
Start: 2021-11-10

## 2021-11-09 RX ADMIN — FENOFIBRATE 160 MG: 160 TABLET ORAL at 10:20

## 2021-11-09 RX ADMIN — ENOXAPARIN SODIUM 40 MG: 100 INJECTION SUBCUTANEOUS at 10:20

## 2021-11-09 RX ADMIN — SODIUM CHLORIDE, PRESERVATIVE FREE 10 ML: 5 INJECTION INTRAVENOUS at 20:33

## 2021-11-09 RX ADMIN — SODIUM CHLORIDE, PRESERVATIVE FREE 10 ML: 5 INJECTION INTRAVENOUS at 10:21

## 2021-11-09 RX ADMIN — OXYBUTYNIN CHLORIDE 10 MG: 5 TABLET ORAL at 20:30

## 2021-11-09 NOTE — PLAN OF CARE
Problem: Falls - Risk of:  Goal: Will remain free from falls  Description: Will remain free from falls  Outcome: Ongoing  Note: Pt free from falls this shift. Fall precautions in place at all times. Call light within reach. Pt able to and agreeable to contact for safety appropriately. Problem: Skin Integrity:  Goal: Will show no infection signs and symptoms  Description: Will show no infection signs and symptoms  Outcome: Ongoing  Note: Skin assessment completed every shift. Pt assessed for incontinence, appropriate barrier cream applied prn as needed. Pt encouraged to turn/rotate every 2 hours. Assistance provided if pt unable to do so themselves. Problem: SAFETY  Goal: Free from accidental physical injury  Outcome: Ongoing  Note: Pt A&O aware of her abilities. Pt understands and knows to call when in need of ambulation. Measures were made to prevent accidental needle stick, friction, shear, etc. Environment kept free of clutter and adequate lighting provided. Will continue to monitor for physical injury.'     Problem: PAIN  Goal: Patient's pain/discomfort is manageable  Outcome: Ongoing  Note: Pain/discomfort being managed with PRN analgesics per MD order. Pt able to express presence and absence of pain and rate pain appropriately using numerical scale       Problem: DISCHARGE BARRIERS  Goal: Patient's continuum of care needs are met  Outcome: Ongoing  Note: Continuing to work with patient and health care team on discharge plan. Discharge instructions and medication management will be reviewed prior to discharge.

## 2021-11-09 NOTE — PROGRESS NOTES
Progress Note  The patient is being evaluated for BLE weakness    Updates  Feels about the same. Per nursing radiology requesting provider for critical results. She had multilevel cervical spine cord compression.      Active Ambulatory Problems     Diagnosis Date Noted    No Active Ambulatory Problems     Resolved Ambulatory Problems     Diagnosis Date Noted    No Resolved Ambulatory Problems     Past Medical History:   Diagnosis Date    Depression     Hyperlipidemia     Hypertension        Current Facility-Administered Medications:     influenza quadrivalent split vaccine (FLUZONE;FLUARIX;FLULAVAL;AFLURIA) injection 0.5 mL, 0.5 mL, IntraMUSCular, Prior to discharge, José Miguel Tsang MD    fenofibrate (TRIGLIDE) tablet 160 mg, 160 mg, Oral, Daily, Maru S Puthoff, APRN - CNP    oxybutynin (DITROPAN) tablet 10 mg, 10 mg, Oral, Nightly, Maru S Puthoff, APRN - CNP, 10 mg at 11/08/21 2043    sodium chloride flush 0.9 % injection 5-40 mL, 5-40 mL, IntraVENous, 2 times per day, Maru S Puthoff, APRN - CNP, 10 mL at 11/08/21 2044    sodium chloride flush 0.9 % injection 5-40 mL, 5-40 mL, IntraVENous, PRN, Maru S Puthoff, APRN - CNP    0.9 % sodium chloride infusion, 25 mL, IntraVENous, PRN, Maru S Puthoff, APRN - CNP    enoxaparin (LOVENOX) injection 40 mg, 40 mg, SubCUTAneous, Daily, Maru S Puthoff, APRN - CNP, 40 mg at 11/08/21 1649    ondansetron (ZOFRAN-ODT) disintegrating tablet 4 mg, 4 mg, Oral, Q8H PRN **OR** ondansetron (ZOFRAN) injection 4 mg, 4 mg, IntraVENous, Q6H PRN, Maru S Puthoff, APRN - CNP    polyethylene glycol (GLYCOLAX) packet 17 g, 17 g, Oral, Daily PRN, Maru S Puthoff, APRN - CNP    acetaminophen (TYLENOL) tablet 650 mg, 650 mg, Oral, Q6H PRN **OR** acetaminophen (TYLENOL) suppository 650 mg, 650 mg, Rectal, Q6H PRN, Maru S Puthoff, APRN - CNP    [Held by provider] DULoxetine (CYMBALTA) extended release capsule 60 mg, 60 mg, Oral, Daily, Maru Walters, APRN - CNP    [Held by provider] citalopram (CELEXA) tablet 20 mg, 20 mg, Oral, Daily, CASTILLO Cox - CNP    [Held by provider] risperiDONE (RISPERDAL) tablet 1 mg, 1 mg, Oral, BID, CASTILLO Cox - CNP      ROS -   Constitutional- No  fevers  Respiratory- No dyspnea. No Cough  Genitourinary- No incontinence. No urinary retention  Musculoskeletal- No myalgia. + neck arthralgia  Neurologic- + weakness. No Headache.  influenza virus vaccine  0.5 mL IntraMUSCular Prior to discharge    fenofibrate  160 mg Oral Daily    oxybutynin  10 mg Oral Nightly    sodium chloride flush  5-40 mL IntraVENous 2 times per day    enoxaparin  40 mg SubCUTAneous Daily    [Held by provider] DULoxetine  60 mg Oral Daily    [Held by provider] citalopram  20 mg Oral Daily    [Held by provider] risperiDONE  1 mg Oral BID         sodium chloride          sodium chloride flush, sodium chloride, ondansetron **OR** ondansetron, polyethylene glycol, acetaminophen **OR** acetaminophen     Exam:  Blood pressure 137/69, pulse 79, temperature 98.8 °F (37.1 °C), temperature source Oral, resp. rate 18, height 5' (1.524 m), weight 156 lb 4.9 oz (70.9 kg), SpO2 92 %. Constitutional                          Vital signs: BP, HR, and RR reviewed            General Alert, no distress, well-nourished  Eyes: unable to visualize the fundi  Cardiovascular: pulses symmetric in all 4 extremities. No peripheral edema. Psychiatric: cooperative with examination, no  psychotic behavior noted. Neurologic  Mental status:   orientation to person, place, time and situation              General fund of knowledge:  grossly intact              Memory: Short term and long term intact              Attention intact as able to attend well to the exam                Language fluent in conversation. No aphasia. Comprehension intact; follows simple commands.    Cranial nerves:   CN2: Visual Fields full w/o extinction on confrontational testing  CN 3,4,6: Dysconjugate primary gaze exotropic. Incomplete abduction bilaterally. CN7: upper and lower facial symmetric. Speech is dysarthric, she has poor fitting dentures. CN8: hearing grossly intact to conversation. CN12: tongue with mild right deviation. Strength: Grasp: BUE 5/5 . RUE: 5/5 Shoulder abduction, elbow flexion, elbow extension. LUE: 5/5  Shoulder abduction, elbow flexion, elbow extension. Dorsiflexion: R 3/5, L 2/5 ;  Plantar flexion: R 3/5, L foot drop. RLE: 5/5 Hip flexion, Knee flexion, Knee extension. LLE: 5/5 Hip flexion, Knee flexion, Knee extension on isolated testing-effort dependent,    Sensory: light touch in all 4 extremities. Cerebellar/coordination: finger nose finger normal without ataxia. Tone: Increased throughout. Worse in BLE compared to BUE. Gait: deferred for safety. Labs  Na: 139  K: 4.3  BUN: 18  Creatinine: 0.8  Glucose: 93  Ca: 10.0    WBC: 8.8  Hgb: 12.5  Platelet: 668    UA: negative for nitrites and leukocyte esterase. 1+ Bacteria. No reflex to culture. Studies  MRI Brain w/o 11/9/21: Independently reviewed. 1. No acute cortical infarct or other acute intracranial process. 2. Moderate generalized parenchymal volume loss, chronic small vessel ischemia and disproportionate enlargement of the supratentorial ventricles with configuration suggesting central white matter volume loss. 3. Incidental, suspected tiny meningioma overlying the right temporal convexity without mass effect on the underlying brain parenchyma or underlying parenchymal signal abnormality. MRI Cervical Spine w/o 11/9/21: Independently reviewed. 1. Severe canal stenosis with cord compression and rightward displacement of the cervical cord at C4-5 related to large left subarticular disc protrusion and osteophytic spurring with mild cord edema.  2. Mild right eccentric cord compression at C6-7 without gross abnormal underlying cord signal.       MRI Thoracic Spine w/o 11/9/21: Independently reviewed. 1. Mild spinal canal stenosis and mild bilateral neural foraminal narrowing at T9-10 and T11-T12, as described above. 2. Mild spinal canal stenosis at T7-8, as described above. 3. Additional mild multilevel degenerative changes, as described above. MRI Lumbar Spine w/o 11/9/21: Independently reviewed. 1. Multilevel lumbar spondylosis, most pronounced at L4-5 resulting in moderate to severe canal stenosis. Impression  1. Reported generalized weakness   2. Impaired mobility  3. Hypertonia. 4. Recent UTI  5. Cerebral Palsy     Eagle Barrera is a 73 y.o. female with hx of cerebral palsy, with chronic gait impairment who presents with 1 week history of generalized weakness, worsening impaired mobility as unable to stand. Recently was seen by PCP for which work up was revealing for UTI, elevated inflammatory marks possibly 2/2 to the above. She was started on antibiotic therapy. MRI neuroaxis w/o was completed, and most revealing for multilevel cervical cord compression with mild edema at C4-5 which likely explains her symptoms. Recommendations  - Critical results discussed with radiology. Primary team notified and to place Neurosurgery consultation for multilevel cervical cord compression. Any necessary steroids for her edema will defer to Neurosurgery. - Infectious and metabolic work up per primary team.   - Would hold off on any out of bed therapy pending Neurosurgery evaluation/clearance. - Will discuss any further recommendations with Neurology attending. Please call back with any questions, concerns.      Byron Yanes, [address] Neurology    A copy of this note was provided for Dr Kailey High MD

## 2021-11-09 NOTE — FLOWSHEET NOTE
Received call from radiology department regarding critical MRI results. Martín Serrano, neurology NP, on unit and agreeable to taking results. Handed phone off to her. Spoke with hospitalist, Dr. Kelsy Miller, via telephone regarding results. See new orders.   Electronically signed by Allen La RN on 11/9/2021 at 11:47 AM

## 2021-11-09 NOTE — PROGRESS NOTES
Physical Therapy  Facility/Department: [address] MED SURG  Daily Treatment Note  NAME: Karmen Villa  : 1950  MRN: [id number]    Date of Service: 2021    Discharge Recommendations:  Patient would benefit from continued therapy after discharge, 3-5 sessions per week     Karmen Villa scored a  on the AM-PAC short mobility form. Current research shows that an AM-PAC score of 17 or less is typically not associated with a discharge to the patient's home setting. Based on the patient's AM-PAC score and their current functional mobility deficits, it is recommended that the patient have 3-5 sessions per week of Physical Therapy at d/c to increase the patient's independence. Please see assessment section for further patient specific details. If patient discharges prior to next session this note will serve as a discharge summary. Please see below for the latest assessment towards goals. Assessment   Body structures, Functions, Activity limitations: Decreased functional mobility ; Decreased ADL status; Decreased strength; Decreased balance; Decreased ROM  Assessment: Pt is a 73 y.o. female with hx of CP who presented to the ED on 21 with progressive generalized weakness and debility. Prior to admission, pt living in home setting with sister and NICK, independent with ADLs and ambulation with RW. Pt currently functioning well below baseline. Recommend continued skilled therapy 3-5x/week to maximize independence and safety with functional mobility. Treatment Diagnosis: impaired mobility  Prognosis: Fair  Decision Making: Medium Complexity  History: see below  Exam: see below  Clinical Presentation: evolving  PT Education: Goals; PT Role; Plan of Care; General Safety;  Functional Mobility Training; Transfer Training; Gait Training  REQUIRES PT FOLLOW UP: Yes  Activity Tolerance  Activity Tolerance: Patient limited by fatigue; Patient limited by endurance     Patient Diagnosis(es): The primary encounter diagnosis was Weakness of both lower extremities. A diagnosis of Unable to ambulate was also pertinent to this visit. has a past medical history of Depression, Hyperlipidemia, and Hypertension. has no past surgical history on file. Restrictions  Restrictions/Precautions  Restrictions/Precautions: Fall Risk  Position Activity Restriction  Other position/activity restrictions: Hx of CP     Subjective   General  Chart Reviewed: Yes  Additional Pertinent Hx: Pt is a 73 y.o. female with hx of CP who presented to the ED on 11/7/21 with progressive generalized weakness and debility. Response To Previous Treatment: Patient with no complaints from previous session. Family / Caregiver Present: No  Referring Practitioner: CASTILLO Mcnair CNP  Subjective  Subjective: Pt is agreeable to PT. Pt on bedpan upon entering room. Transport present to take patient to MRI.           Orientation  Orientation  Overall Orientation Status: Within Functional Limits     Cognition   Cognition  Overall Cognitive Status: WFL     Objective   Bed mobility  Supine to Sit: Moderate assistance  Sit to Supine: Maximum assistance  Scooting: Stand by assistance  Transfers  Sit to Stand: Minimal Assistance (to RW)  Stand to sit: Minimal Assistance (to RW)  Ambulation  Ambulation?: Yes  Ambulation 1  Surface: level tile  Device: Rolling Walker  Assistance: Minimal assistance  Gait Deviations: Slow Patsy; Decreased step length; Decreased step height  Distance: 5'  Comments: Pt reporting max distance  Stairs/Curb  Stairs?: No     Balance  Posture: Fair  Sitting - Static: Good  Sitting - Dynamic: Good  Standing - Static: Fair (@RW)  Standing - Dynamic: Fair (@RW)              AM-PAC Score  AM-PAC Inpatient Mobility Raw Score : 11 (11/09/21 0745)  AM-PAC Inpatient T-Scale Score : 33.86 (11/09/21 0745)  Mobility Inpatient CMS 0-100% Score: 72.57 (11/09/21 0745)  Mobility Inpatient CMS G-Code Modifier : CL (11/09/21 0745) Goals  Short term goals  Time Frame for Short term goals: by acute discharge - all goals ongoing as of 11/9/21  Short term goal 1: bed mobility with min A  Short term goal 2: sit<>Stand with CGA  Short term goal 3: ambulate > 20' with RW and SBA  Patient Goals   Patient goals : none stated    Plan    Plan  Times per week: 3-5x/week  Current Treatment Recommendations: Strengthening, ADL/Self-care Training, ROM, Balance Training, Functional Mobility Training, Transfer Training, Neuromuscular Re-education, Gait Training, Patient/Caregiver Education & Training, Equipment Evaluation, Education, & procurement, Safety Education & Training  Safety Devices  Type of devices:  (pt on stretcher for transport to MRI)     Therapy Time   Individual Concurrent Group Co-treatment   Time In 0705         Time Out 0730         Minutes 25         Timed Code Treatment Minutes: 25 Minutes       Gayathri Olsen, PT

## 2021-11-09 NOTE — PROGRESS NOTES
Hospitalist Progress Note      PCP: Jordi Logan MD    Date of Admission: 11/7/2021    Chief Complaint: weakness    Hospital Course:     Subjective: no new neurologic symptoms. MRI shows severe spinal cord compression in cervical spine       Medications:  Reviewed    Infusion Medications    sodium chloride       Scheduled Medications    influenza virus vaccine  0.5 mL IntraMUSCular Prior to discharge    fenofibrate  160 mg Oral Daily    oxybutynin  10 mg Oral Nightly    sodium chloride flush  5-40 mL IntraVENous 2 times per day    [Held by provider] enoxaparin  40 mg SubCUTAneous Daily    [Held by provider] DULoxetine  60 mg Oral Daily    [Held by provider] citalopram  20 mg Oral Daily    [Held by provider] risperiDONE  1 mg Oral BID     PRN Meds: sodium chloride flush, sodium chloride, ondansetron **OR** ondansetron, polyethylene glycol, acetaminophen **OR** acetaminophen      Intake/Output Summary (Last 24 hours) at 11/9/2021 1245  Last data filed at 11/9/2021 1035  Gross per 24 hour   Intake 480 ml   Output 500 ml   Net -20 ml       Exam:    /69   Pulse 79   Temp 98.8 °F (37.1 °C) (Oral)   Resp 18   Ht 5' (1.524 m)   Wt 156 lb 4.9 oz (70.9 kg)   SpO2 92%   BMI 30.53 kg/m²     General appearance: No apparent distress, appears stated age and cooperative. HEENT: Pupils equal, round, and reactive to light. Conjunctivae/corneas clear. Neck: Supple, with full range of motion. No jugular venous distention. Trachea midline. Respiratory:  Normal respiratory effort. Clear to auscultation, bilaterally without Rales/Wheezes/Rhonchi. Cardiovascular: Regular rate and rhythm with normal S1/S2 without murmurs, rubs or gallops. Abdomen: Soft, non-tender, non-distended with normal bowel sounds. Musculoskeletal: No clubbing, cyanosis or edema bilaterally. Left lower extremity with spasticity and contracture. Moves all other extremities.   Skin: Skin color, texture, turgor normal.  No rashes or lesions. Neurologic:  . Cranial nerves: II-XII intact, grossly non-focal.  Psychiatric: Alert and oriented, thought content appropriate, normal insight  Capillary Refill: Brisk,< 3 seconds   Peripheral Pulses: +2 palpable, equal bilaterally       Labs:   Recent Labs     11/07/21  1645   WBC 8.8   HGB 12.5   HCT 37.9        Recent Labs     11/07/21  1645      K 4.3      CO2 26   BUN 18   CREATININE 0.8   CALCIUM 10.0     No results for input(s): AST, ALT, BILIDIR, BILITOT, ALKPHOS in the last 72 hours. Recent Labs     11/09/21  1152   INR 1.06     Recent Labs     11/07/21  1645   TROPONINI <0.01       Urinalysis:      Lab Results   Component Value Date    NITRU Negative 11/07/2021    WBCUA 1 11/07/2021    BACTERIA 1+ 11/07/2021    RBCUA 1 11/07/2021    BLOODU Negative 11/07/2021    SPECGRAV 1.011 11/07/2021    GLUCOSEU Negative 11/07/2021       Radiology:  MRI LUMBAR SPINE WO CONTRAST   Final Result   1. Multilevel lumbar spondylosis, most pronounced at L4-5 resulting in   moderate to severe canal stenosis. MRI THORACIC SPINE WO CONTRAST   Final Result   1. Mild spinal canal stenosis and mild bilateral neural foraminal narrowing   at T9-10 and T11-T12, as described above. 2. Mild spinal canal stenosis at T7-8, as described above. 3. Additional mild multilevel degenerative changes, as described above. MRI CERVICAL SPINE WO CONTRAST   Final Result   1. Severe canal stenosis with cord compression and rightward displacement of   the cervical cord at C4-5 related to large left subarticular disc protrusion   and osteophytic spurring with mild cord edema. 2. Mild right eccentric cord compression at C6-7 without abnormal underlying   cord signal.   Findings were discussed with Carmen Pratt NP at 11:05 am on 11/9/2021. MRI BRAIN WO CONTRAST   Final Result   1. No acute cortical infarct or other acute intracranial process.    2. Moderate generalized parenchymal volume loss, chronic small vessel   ischemia and disproportionate enlargement of the supratentorial ventricles   with configuration suggesting central white matter volume loss. 3. Incidental, suspected tiny meningioma overlying the right temporal   convexity without mass effect on the underlying brain parenchyma or   underlying parenchymal signal abnormality. XR CHEST PORTABLE   Final Result   No airspace disease by radiograph. Assessment/Plan:    Active Hospital Problems    Diagnosis Date Noted    Generalized weakness [R53.1] 11/07/2021       Severe CNS cord compression  - severe cervical disc herniation and cord compression leftward causing deviation of cord to the right  - No evidence of infection. No electrolyte derangement. - Neurosurgery consulted:  Urgent surgery 11/10. - per Dr. Lacie Santoyo, no steroids needed       Depression: Risperdal, Cymbalta and Celexa for now to make sure that this is not potentially contributing to an accelerated generalized weakness. Resume when indicated.       DVT Prophylaxis: Lovenox  Diet: ADULT DIET;  Regular  Diet NPO  Code Status: Full Code    PT/OT Eval Status: SNF    Dispo - Tricia Sheridan MD

## 2021-11-09 NOTE — CONSULTS
[address] [address]                                  CONSULTATION    PATIENT NAME: Yoshi Gage                     :        1950  MED REC NO:   [id number]                          ROOM:       [id number]  ACCOUNT NO:   [de-identified]                           ADMIT DATE: 2021  PROVIDER:     Anupam Martinez MD    CONSULT DATE:  2021    REASON FOR CONSULTATION:  Cervical disk herniation with myelopathy. HISTORY OF PRESENT ILLNESS:  The patient is a 71-year-old who presented  to the Emergency [facility] Department with complaints of progressive  weakness with inability to get up to walk with walker. She was taken to  the emergency department by her sister. She lives with her sister. She  has been living with her sister for about 20 years. She is managed with  her cerebral palsy and she has been using a walker for 20 years. Sister  noted that she has been having difficulty pulling to stand and  ambulating. Workup was performed. She had a mild UTI. Imaging was  performed that showed an enormous disk herniation at C4-5 with severe  cord compression and Neurosurgery was consulted. The patient denies  pain. She is not in distress. There is no shortness of breath, nausea,  vomiting, diarrhea. ED worked up for sepsis which was negative. She is  hard of hearing and has left-sided spasticity because of cerebral palsy. PAST MEDICAL HISTORY:  Depression, hyperlipidemia, hypertension and  cerebral palsy. PAST SURGICAL HISTORY:  None. MEDICATIONS:  Cymbalta, Celexa, Ditropan, Risperdal, Colace, vitamins. ALLERGIES:  ATORVASTATIN, SULFA. SOCIAL HISTORY:  Lives with sister. FAMILY HISTORY:  Noncontributory. REVIEW OF SYSTEMS:  Positive for generalized weakness. All other  systems reviewed and they are negative.     PHYSICAL EXAMINATION:  GENERAL:  She is awake, alert, not in distress, appears stated age. HEENT:  Atraumatic, normocephalic. COR:  Regular rate and rhythm. CHEST:  Nonlabored breathing. ABDOMEN:  Soft, nondistended. EXTREMITIES:  Hand contractures, left ankle contracture and spasticity. NEUROLOGIC:  Normal cranial nerves except for hard of hearing  bilaterally. Poor muscle tone on bilateral upper extremities. Strength  on the right is 5/5 in the upper extremity. Able to move her right leg  easily. Per sister, her left side is generally baseline cerebral palsy. MRI of cervical spine reviewed independently by me shows an enormous  disk herniation central and leftward at C4-5 which is producing severe  cord compression on the left with deviation of the cord of the right and  there is a C6-7 rightward disk herniation which is abutting the cord but  it is not causing compression to the extent at the C4-5. IMPRESSION:  Cervical disk herniation with myelopathy. RECOMMENDATIONS:  Discussed findings with the patient and the sister. Plan is to be taken to the operating room tomorrow for a C4-5 anterior  cervical diskectomy fusion with decompression and stabilization. The  procedure was explained to the patient and the risks were outlined  including but not limited to bleeding, infection, reaction to  anesthesia, medicine, death, failure to improve her symptoms, the need  for additional procedures, weakness, numbness, pain, paralysis, spinal  fluid leak, and unforeseen circumstances. She verbalized understanding  and wished to proceed.         Raciel Mccallum MD    D: 11/09/2021 12:37:08       T: 11/09/2021 12:39:30     [email or web address]  Job#: [id number]     Doc#: [id number]    CC:

## 2021-11-09 NOTE — CONSULTS
Here to see patient at the urgent request of Dr Nevaeh Hoang. SIster at bedside. See fully dictated consult note. Patient 72 yo with CP lives with sister, noted decline in function and ambulation. MRI c spine performed, this show enormous C45 disc herniation with cervical cord compression leftward and deviation of the cord to the right. Patient moves all extremities except left lower extremity has contracture and spasticity. Discussed findings with patient and sister. WIll be taken to the operating room tomorrow am for acdf C45 decompression and acdf stabilization. Risks were outlined and they were in agreement to proceed.     [id number]

## 2021-11-09 NOTE — CARE COORDINATION
DAVID placed phone call to [facility] admissions to inform that patient is not ready for discharge this afternoon. DAVID called [phone] and left a message. Respectfully submitted,    SHAILA Tovar  [facility]   [phone]    Electronically signed by SHAILA Mason on 11/9/2021 at 11:21 AM

## 2021-11-10 ENCOUNTER — APPOINTMENT (OUTPATIENT)
Dept: GENERAL RADIOLOGY | Age: 71
DRG: 473 | End: 2021-11-10
Payer: MEDICARE

## 2021-11-10 ENCOUNTER — ANESTHESIA (OUTPATIENT)
Dept: OPERATING ROOM | Age: 71
DRG: 473 | End: 2021-11-10
Payer: MEDICARE

## 2021-11-10 VITALS
SYSTOLIC BLOOD PRESSURE: 139 MMHG | OXYGEN SATURATION: 99 % | DIASTOLIC BLOOD PRESSURE: 74 MMHG | RESPIRATION RATE: 9 BRPM | TEMPERATURE: 98.2 F

## 2021-11-10 PROCEDURE — 2580000003 HC RX 258: Performed by: NURSE PRACTITIONER

## 2021-11-10 PROCEDURE — 3600000004 HC SURGERY LEVEL 4 BASE: Performed by: NEUROLOGICAL SURGERY

## 2021-11-10 PROCEDURE — 6360000002 HC RX W HCPCS: Performed by: NEUROLOGICAL SURGERY

## 2021-11-10 PROCEDURE — 6370000000 HC RX 637 (ALT 250 FOR IP): Performed by: NEUROLOGICAL SURGERY

## 2021-11-10 PROCEDURE — 0RB30ZZ EXCISION OF CERVICAL VERTEBRAL DISC, OPEN APPROACH: ICD-10-PCS | Performed by: NEUROLOGICAL SURGERY

## 2021-11-10 PROCEDURE — 7100000000 HC PACU RECOVERY - FIRST 15 MIN: Performed by: NEUROLOGICAL SURGERY

## 2021-11-10 PROCEDURE — 72020 X-RAY EXAM OF SPINE 1 VIEW: CPT

## 2021-11-10 PROCEDURE — 1200000000 HC SEMI PRIVATE

## 2021-11-10 PROCEDURE — 7100000001 HC PACU RECOVERY - ADDTL 15 MIN: Performed by: NEUROLOGICAL SURGERY

## 2021-11-10 PROCEDURE — 3209999900 FLUORO FOR SURGICAL PROCEDURES

## 2021-11-10 PROCEDURE — 2580000003 HC RX 258: Performed by: NEUROLOGICAL SURGERY

## 2021-11-10 PROCEDURE — 3700000000 HC ANESTHESIA ATTENDED CARE: Performed by: NEUROLOGICAL SURGERY

## 2021-11-10 PROCEDURE — 2500000003 HC RX 250 WO HCPCS: Performed by: NEUROLOGICAL SURGERY

## 2021-11-10 PROCEDURE — 9990000010 HC NO CHARGE VISIT

## 2021-11-10 PROCEDURE — 2709999900 HC NON-CHARGEABLE SUPPLY: Performed by: NEUROLOGICAL SURGERY

## 2021-11-10 PROCEDURE — 00NW0ZZ RELEASE CERVICAL SPINAL CORD, OPEN APPROACH: ICD-10-PCS | Performed by: NEUROLOGICAL SURGERY

## 2021-11-10 PROCEDURE — 2500000003 HC RX 250 WO HCPCS

## 2021-11-10 PROCEDURE — 3700000001 HC ADD 15 MINUTES (ANESTHESIA): Performed by: NEUROLOGICAL SURGERY

## 2021-11-10 PROCEDURE — C1713 ANCHOR/SCREW BN/BN,TIS/BN: HCPCS | Performed by: NEUROLOGICAL SURGERY

## 2021-11-10 PROCEDURE — 6360000002 HC RX W HCPCS

## 2021-11-10 PROCEDURE — 2720000010 HC SURG SUPPLY STERILE: Performed by: NEUROLOGICAL SURGERY

## 2021-11-10 PROCEDURE — 0RG10A0 FUSION OF CERVICAL VERTEBRAL JOINT WITH INTERBODY FUSION DEVICE, ANTERIOR APPROACH, ANTERIOR COLUMN, OPEN APPROACH: ICD-10-PCS | Performed by: NEUROLOGICAL SURGERY

## 2021-11-10 PROCEDURE — 3600000014 HC SURGERY LEVEL 4 ADDTL 15MIN: Performed by: NEUROLOGICAL SURGERY

## 2021-11-10 DEVICE — IMPLANTABLE DEVICE
Type: IMPLANTABLE DEVICE | Site: NECK | Status: FUNCTIONAL
Brand: TRINICA® TRINICA®

## 2021-11-10 DEVICE — IMPLANTABLE DEVICE: Type: IMPLANTABLE DEVICE | Site: NECK | Status: FUNCTIONAL

## 2021-11-10 DEVICE — IMPLANTABLE DEVICE
Type: IMPLANTABLE DEVICE | Site: NECK | Status: FUNCTIONAL
Brand: TRINICA®

## 2021-11-10 RX ORDER — MAGNESIUM HYDROXIDE/ALUMINUM HYDROXICE/SIMETHICONE 120; 1200; 1200 MG/30ML; MG/30ML; MG/30ML
15 SUSPENSION ORAL EVERY 6 HOURS PRN
Status: DISCONTINUED | OUTPATIENT
Start: 2021-11-10 | End: 2021-11-12 | Stop reason: HOSPADM

## 2021-11-10 RX ORDER — MIDAZOLAM HYDROCHLORIDE 1 MG/ML
INJECTION INTRAMUSCULAR; INTRAVENOUS PRN
Status: DISCONTINUED | OUTPATIENT
Start: 2021-11-10 | End: 2021-11-10 | Stop reason: SDUPTHER

## 2021-11-10 RX ORDER — BUPIVACAINE HYDROCHLORIDE AND EPINEPHRINE 5; 5 MG/ML; UG/ML
INJECTION, SOLUTION EPIDURAL; INTRACAUDAL; PERINEURAL
Status: COMPLETED | OUTPATIENT
Start: 2021-11-10 | End: 2021-11-10

## 2021-11-10 RX ORDER — CEFAZOLIN SODIUM 1 G/3ML
2000 INJECTION, POWDER, FOR SOLUTION INTRAMUSCULAR; INTRAVENOUS ONCE
Status: DISCONTINUED | OUTPATIENT
Start: 2021-11-10 | End: 2021-11-10 | Stop reason: HOSPADM

## 2021-11-10 RX ORDER — OXYCODONE HYDROCHLORIDE 10 MG/1
10 TABLET ORAL EVERY 4 HOURS PRN
Status: DISCONTINUED | OUTPATIENT
Start: 2021-11-10 | End: 2021-11-12 | Stop reason: HOSPADM

## 2021-11-10 RX ORDER — DEXAMETHASONE SODIUM PHOSPHATE 4 MG/ML
INJECTION, SOLUTION INTRA-ARTICULAR; INTRALESIONAL; INTRAMUSCULAR; INTRAVENOUS; SOFT TISSUE PRN
Status: DISCONTINUED | OUTPATIENT
Start: 2021-11-10 | End: 2021-11-10 | Stop reason: SDUPTHER

## 2021-11-10 RX ORDER — PHENYLEPHRINE HCL IN 0.9% NACL 1 MG/10 ML
SYRINGE (ML) INTRAVENOUS PRN
Status: DISCONTINUED | OUTPATIENT
Start: 2021-11-10 | End: 2021-11-10 | Stop reason: SDUPTHER

## 2021-11-10 RX ORDER — CEFAZOLIN SODIUM 1 G/3ML
INJECTION, POWDER, FOR SOLUTION INTRAMUSCULAR; INTRAVENOUS PRN
Status: DISCONTINUED | OUTPATIENT
Start: 2021-11-10 | End: 2021-11-10 | Stop reason: SDUPTHER

## 2021-11-10 RX ORDER — SODIUM CHLORIDE 9 MG/ML
25 INJECTION, SOLUTION INTRAVENOUS PRN
Status: DISCONTINUED | OUTPATIENT
Start: 2021-11-10 | End: 2021-11-12 | Stop reason: HOSPADM

## 2021-11-10 RX ORDER — FENTANYL CITRATE 50 UG/ML
INJECTION, SOLUTION INTRAMUSCULAR; INTRAVENOUS PRN
Status: DISCONTINUED | OUTPATIENT
Start: 2021-11-10 | End: 2021-11-10 | Stop reason: SDUPTHER

## 2021-11-10 RX ORDER — FENTANYL CITRATE 50 UG/ML
25 INJECTION, SOLUTION INTRAMUSCULAR; INTRAVENOUS EVERY 5 MIN PRN
Status: DISCONTINUED | OUTPATIENT
Start: 2021-11-10 | End: 2021-11-10 | Stop reason: HOSPADM

## 2021-11-10 RX ORDER — METHOCARBAMOL 100 MG/ML
INJECTION, SOLUTION INTRAMUSCULAR; INTRAVENOUS PRN
Status: DISCONTINUED | OUTPATIENT
Start: 2021-11-10 | End: 2021-11-10 | Stop reason: SDUPTHER

## 2021-11-10 RX ORDER — ONDANSETRON 2 MG/ML
4 INJECTION INTRAMUSCULAR; INTRAVENOUS EVERY 6 HOURS PRN
Status: DISCONTINUED | OUTPATIENT
Start: 2021-11-10 | End: 2021-11-12 | Stop reason: HOSPADM

## 2021-11-10 RX ORDER — LABETALOL HYDROCHLORIDE 5 MG/ML
5 INJECTION, SOLUTION INTRAVENOUS EVERY 10 MIN PRN
Status: DISCONTINUED | OUTPATIENT
Start: 2021-11-10 | End: 2021-11-10 | Stop reason: HOSPADM

## 2021-11-10 RX ORDER — ONDANSETRON 2 MG/ML
INJECTION INTRAMUSCULAR; INTRAVENOUS PRN
Status: DISCONTINUED | OUTPATIENT
Start: 2021-11-10 | End: 2021-11-10 | Stop reason: SDUPTHER

## 2021-11-10 RX ORDER — LIDOCAINE HYDROCHLORIDE 20 MG/ML
INJECTION, SOLUTION EPIDURAL; INFILTRATION; INTRACAUDAL; PERINEURAL PRN
Status: DISCONTINUED | OUTPATIENT
Start: 2021-11-10 | End: 2021-11-10 | Stop reason: SDUPTHER

## 2021-11-10 RX ORDER — SODIUM CHLORIDE 0.9 % (FLUSH) 0.9 %
5-40 SYRINGE (ML) INJECTION PRN
Status: DISCONTINUED | OUTPATIENT
Start: 2021-11-10 | End: 2021-11-12 | Stop reason: HOSPADM

## 2021-11-10 RX ORDER — PROPOFOL 10 MG/ML
INJECTION, EMULSION INTRAVENOUS PRN
Status: DISCONTINUED | OUTPATIENT
Start: 2021-11-10 | End: 2021-11-10 | Stop reason: SDUPTHER

## 2021-11-10 RX ORDER — OXYCODONE HYDROCHLORIDE 5 MG/1
5 TABLET ORAL EVERY 4 HOURS PRN
Status: DISCONTINUED | OUTPATIENT
Start: 2021-11-10 | End: 2021-11-12 | Stop reason: HOSPADM

## 2021-11-10 RX ORDER — PROMETHAZINE HYDROCHLORIDE 25 MG/ML
6.25 INJECTION, SOLUTION INTRAMUSCULAR; INTRAVENOUS
Status: DISCONTINUED | OUTPATIENT
Start: 2021-11-10 | End: 2021-11-10 | Stop reason: HOSPADM

## 2021-11-10 RX ORDER — SODIUM CHLORIDE 0.9 % (FLUSH) 0.9 %
5-40 SYRINGE (ML) INJECTION EVERY 12 HOURS SCHEDULED
Status: DISCONTINUED | OUTPATIENT
Start: 2021-11-10 | End: 2021-11-12 | Stop reason: HOSPADM

## 2021-11-10 RX ORDER — ONDANSETRON 4 MG/1
4 TABLET, ORALLY DISINTEGRATING ORAL EVERY 8 HOURS PRN
Status: DISCONTINUED | OUTPATIENT
Start: 2021-11-10 | End: 2021-11-12 | Stop reason: HOSPADM

## 2021-11-10 RX ORDER — GLYCOPYRROLATE 0.2 MG/ML
INJECTION INTRAMUSCULAR; INTRAVENOUS PRN
Status: DISCONTINUED | OUTPATIENT
Start: 2021-11-10 | End: 2021-11-10 | Stop reason: SDUPTHER

## 2021-11-10 RX ORDER — METHOCARBAMOL 750 MG/1
750 TABLET, FILM COATED ORAL 3 TIMES DAILY
Status: DISCONTINUED | OUTPATIENT
Start: 2021-11-10 | End: 2021-11-12 | Stop reason: HOSPADM

## 2021-11-10 RX ORDER — ROCURONIUM BROMIDE 10 MG/ML
INJECTION, SOLUTION INTRAVENOUS PRN
Status: DISCONTINUED | OUTPATIENT
Start: 2021-11-10 | End: 2021-11-10 | Stop reason: SDUPTHER

## 2021-11-10 RX ADMIN — FENTANYL CITRATE 50 MCG: 50 INJECTION INTRAMUSCULAR; INTRAVENOUS at 11:31

## 2021-11-10 RX ADMIN — ROCURONIUM BROMIDE 50 MG: 10 INJECTION INTRAVENOUS at 10:46

## 2021-11-10 RX ADMIN — MIDAZOLAM 1 MG: 1 INJECTION INTRAMUSCULAR; INTRAVENOUS at 10:37

## 2021-11-10 RX ADMIN — Medication 200 MCG: at 10:56

## 2021-11-10 RX ADMIN — OXYCODONE 5 MG: 5 TABLET ORAL at 18:22

## 2021-11-10 RX ADMIN — METHOCARBAMOL TABLETS 750 MG: 750 TABLET, COATED ORAL at 20:32

## 2021-11-10 RX ADMIN — HYDROMORPHONE HYDROCHLORIDE 0.5 MG: 1 INJECTION, SOLUTION INTRAMUSCULAR; INTRAVENOUS; SUBCUTANEOUS at 12:48

## 2021-11-10 RX ADMIN — CEFAZOLIN SODIUM 2000 MG: 1 INJECTION, POWDER, FOR SOLUTION INTRAMUSCULAR; INTRAVENOUS at 11:03

## 2021-11-10 RX ADMIN — SUGAMMADEX 100 MG: 100 INJECTION, SOLUTION INTRAVENOUS at 12:53

## 2021-11-10 RX ADMIN — SODIUM CHLORIDE: 9 INJECTION, SOLUTION INTRAVENOUS at 10:37

## 2021-11-10 RX ADMIN — LIDOCAINE HYDROCHLORIDE 50 MG: 20 INJECTION, SOLUTION EPIDURAL; INFILTRATION; INTRACAUDAL; PERINEURAL at 10:46

## 2021-11-10 RX ADMIN — DEXAMETHASONE SODIUM PHOSPHATE 10 MG: 4 INJECTION, SOLUTION INTRAMUSCULAR; INTRAVENOUS at 10:50

## 2021-11-10 RX ADMIN — SODIUM CHLORIDE, PRESERVATIVE FREE 5 ML: 5 INJECTION INTRAVENOUS at 08:40

## 2021-11-10 RX ADMIN — METHOCARBAMOL TABLETS 750 MG: 750 TABLET, COATED ORAL at 15:54

## 2021-11-10 RX ADMIN — OXYBUTYNIN CHLORIDE 10 MG: 5 TABLET ORAL at 20:32

## 2021-11-10 RX ADMIN — METHOCARBAMOL 250 MG: 100 INJECTION INTRAMUSCULAR; INTRAVENOUS at 11:14

## 2021-11-10 RX ADMIN — FENTANYL CITRATE 25 MCG: 50 INJECTION INTRAMUSCULAR; INTRAVENOUS at 10:46

## 2021-11-10 RX ADMIN — PROPOFOL 120 MG: 10 INJECTION, EMULSION INTRAVENOUS at 10:46

## 2021-11-10 RX ADMIN — METHOCARBAMOL 250 MG: 100 INJECTION INTRAMUSCULAR; INTRAVENOUS at 12:07

## 2021-11-10 RX ADMIN — Medication 200 MCG: at 10:54

## 2021-11-10 RX ADMIN — SODIUM CHLORIDE, PRESERVATIVE FREE 10 ML: 5 INJECTION INTRAVENOUS at 20:33

## 2021-11-10 RX ADMIN — OXYCODONE 5 MG: 5 TABLET ORAL at 22:11

## 2021-11-10 RX ADMIN — GLYCOPYRROLATE 0.2 MG: 0.2 INJECTION, SOLUTION INTRAMUSCULAR; INTRAVENOUS at 10:37

## 2021-11-10 RX ADMIN — SUGAMMADEX 100 MG: 100 INJECTION, SOLUTION INTRAVENOUS at 12:57

## 2021-11-10 RX ADMIN — FENTANYL CITRATE 25 MCG: 50 INJECTION INTRAMUSCULAR; INTRAVENOUS at 11:12

## 2021-11-10 RX ADMIN — METHOCARBAMOL 250 MG: 100 INJECTION INTRAMUSCULAR; INTRAVENOUS at 12:41

## 2021-11-10 RX ADMIN — METHOCARBAMOL 250 MG: 100 INJECTION INTRAMUSCULAR; INTRAVENOUS at 12:35

## 2021-11-10 RX ADMIN — ROCURONIUM BROMIDE 30 MG: 10 INJECTION INTRAVENOUS at 11:40

## 2021-11-10 RX ADMIN — ONDANSETRON 4 MG: 2 INJECTION INTRAMUSCULAR; INTRAVENOUS at 12:26

## 2021-11-10 ASSESSMENT — PULMONARY FUNCTION TESTS
PIF_VALUE: 14
PIF_VALUE: 16
PIF_VALUE: 17
PIF_VALUE: 1
PIF_VALUE: 15
PIF_VALUE: 16
PIF_VALUE: 2
PIF_VALUE: 16
PIF_VALUE: 13
PIF_VALUE: 17
PIF_VALUE: 16
PIF_VALUE: 17
PIF_VALUE: 14
PIF_VALUE: 17
PIF_VALUE: 16
PIF_VALUE: 14
PIF_VALUE: 17
PIF_VALUE: 16
PIF_VALUE: 17
PIF_VALUE: 17
PIF_VALUE: 15
PIF_VALUE: 14
PIF_VALUE: 17
PIF_VALUE: 16
PIF_VALUE: 16
PIF_VALUE: 17
PIF_VALUE: 14
PIF_VALUE: 17
PIF_VALUE: 17
PIF_VALUE: 16
PIF_VALUE: 16
PIF_VALUE: 15
PIF_VALUE: 20
PIF_VALUE: 17
PIF_VALUE: 18
PIF_VALUE: 4
PIF_VALUE: 16
PIF_VALUE: 15
PIF_VALUE: 1
PIF_VALUE: 16
PIF_VALUE: 16
PIF_VALUE: 27
PIF_VALUE: 3
PIF_VALUE: 16
PIF_VALUE: 1
PIF_VALUE: 16
PIF_VALUE: 1
PIF_VALUE: 0
PIF_VALUE: 17
PIF_VALUE: 16
PIF_VALUE: 17
PIF_VALUE: 17
PIF_VALUE: 15
PIF_VALUE: 1
PIF_VALUE: 16
PIF_VALUE: 17
PIF_VALUE: 1
PIF_VALUE: 17
PIF_VALUE: 1
PIF_VALUE: 17
PIF_VALUE: 13
PIF_VALUE: 17
PIF_VALUE: 16
PIF_VALUE: 16
PIF_VALUE: 14
PIF_VALUE: 17
PIF_VALUE: 17
PIF_VALUE: 18
PIF_VALUE: 16
PIF_VALUE: 1
PIF_VALUE: 16
PIF_VALUE: 2
PIF_VALUE: 17
PIF_VALUE: 15
PIF_VALUE: 16
PIF_VALUE: 17
PIF_VALUE: 17
PIF_VALUE: 16
PIF_VALUE: 17
PIF_VALUE: 2
PIF_VALUE: 17
PIF_VALUE: 17
PIF_VALUE: 16
PIF_VALUE: 15
PIF_VALUE: 17
PIF_VALUE: 16
PIF_VALUE: 16
PIF_VALUE: 17
PIF_VALUE: 16
PIF_VALUE: 15
PIF_VALUE: 17
PIF_VALUE: 15
PIF_VALUE: 16
PIF_VALUE: 16
PIF_VALUE: 15
PIF_VALUE: 17
PIF_VALUE: 16
PIF_VALUE: 16
PIF_VALUE: 18
PIF_VALUE: 16
PIF_VALUE: 16
PIF_VALUE: 14
PIF_VALUE: 16
PIF_VALUE: 17
PIF_VALUE: 16
PIF_VALUE: 16
PIF_VALUE: 17
PIF_VALUE: 14
PIF_VALUE: 17
PIF_VALUE: 16
PIF_VALUE: 3
PIF_VALUE: 17
PIF_VALUE: 16
PIF_VALUE: 17
PIF_VALUE: 16
PIF_VALUE: 16
PIF_VALUE: 14
PIF_VALUE: 4
PIF_VALUE: 4
PIF_VALUE: 17
PIF_VALUE: 1

## 2021-11-10 ASSESSMENT — PAIN DESCRIPTION - FREQUENCY
FREQUENCY: CONTINUOUS

## 2021-11-10 ASSESSMENT — PAIN DESCRIPTION - ONSET
ONSET: ON-GOING

## 2021-11-10 ASSESSMENT — PAIN SCALES - GENERAL
PAINLEVEL_OUTOF10: 4
PAINLEVEL_OUTOF10: 0
PAINLEVEL_OUTOF10: 4
PAINLEVEL_OUTOF10: 0
PAINLEVEL_OUTOF10: 3
PAINLEVEL_OUTOF10: 4

## 2021-11-10 ASSESSMENT — PAIN DESCRIPTION - PAIN TYPE
TYPE: SURGICAL PAIN
TYPE: SURGICAL PAIN
TYPE: ACUTE PAIN;SURGICAL PAIN
TYPE: SURGICAL PAIN;ACUTE PAIN

## 2021-11-10 ASSESSMENT — PAIN DESCRIPTION - ORIENTATION
ORIENTATION: ANTERIOR
ORIENTATION: MID;ANTERIOR
ORIENTATION: MID;ANTERIOR
ORIENTATION: ANTERIOR

## 2021-11-10 ASSESSMENT — PAIN - FUNCTIONAL ASSESSMENT
PAIN_FUNCTIONAL_ASSESSMENT: 0-10
PAIN_FUNCTIONAL_ASSESSMENT: PREVENTS OR INTERFERES SOME ACTIVE ACTIVITIES AND ADLS

## 2021-11-10 ASSESSMENT — PAIN DESCRIPTION - PROGRESSION
CLINICAL_PROGRESSION: NOT CHANGED

## 2021-11-10 ASSESSMENT — PAIN DESCRIPTION - DESCRIPTORS
DESCRIPTORS: ACHING;DISCOMFORT
DESCRIPTORS: DISCOMFORT
DESCRIPTORS: ACHING;DISCOMFORT
DESCRIPTORS: DISCOMFORT

## 2021-11-10 ASSESSMENT — PAIN DESCRIPTION - LOCATION
LOCATION: NECK

## 2021-11-10 NOTE — BRIEF OP NOTE
Brief Postoperative Note      Patient: Eagle Barrrea  YOB: 1950  MRN: [id number]    Date of Procedure: 11/10/2021    Pre-Op Diagnosis: CERVICAL MYELOPATHY    Post-Op Diagnosis: Same       Procedure(s):  ANTERIOR CERVICAL DISCECTOMY WITH FUSION, WITH INTERBODY IMPLANT AND WITH PLATE AND SCREW FIXATION C4-5    Surgeon(s):  Jessica Sanchez MD    Assistant:  * No surgical staff found *    Anesthesia: General    Estimated Blood Loss (mL): Minimal    Complications: None    Specimens:   * No specimens in log *    Implants:  Implant Name Type Inv. Item Serial No.  Lot No. LRB No. Used Action   SPACER SPNL 6MM CANC LULA ALLGRFT - [id number]  SPACER SPNL 6MM CANC LULA ALLGRFT [id number] [employer] [id number] N/A 1 Implanted   PLATE SPNL [id number] 1 LEV STD TI ALLY ANT CERV TRINICA TANK  PLATE SPNL [id number] 1 LEV STD TI ALLY ANT CERV TRINICA TANK  [employer]  N/A 1 Implanted   SCREW SPNL L12MM DIA4.2MM YEL SLV TI SELF DRL FIX ANG FULL  SCREW SPNL L12MM DIA4.2MM YEL SLV TI SELF DRL FIX ANG FULL  [employer]  N/A 4 Implanted         Drains:   [REMOVED] External Urinary Catheter (Removed)   Catheter changed  Yes 11/09/21 0506   Urine Color Yellow 11/09/21 0506   Urine Appearance Clear 11/09/21 0506   Output (mL) 500 mL 11/09/21 0506       Findings: very large central and leftward disc herniation with osteophytes producing severe canal stenosis and cord compression.     Electronically signed by Jessica Sanchez MD on 11/10/2021 at 1:02 PM     [id number]

## 2021-11-10 NOTE — PLAN OF CARE
Problem: Falls - Risk of:  Goal: Will remain free from falls  Description: Will remain free from falls  11/10/2021 1641 by Bethel Thurman RN  Outcome: Ongoing  Note: Fall risk assessment completed. Fall precautions in place. Call light within reach. Pt educated on calling for assistance before getting up. Walkway free of clutter. Will continue to monitor. 11/10/2021 0528 by Wale Tapia RN  Outcome: Ongoing  Goal: Absence of physical injury  Description: Absence of physical injury  11/10/2021 1641 by Bethel Thurman RN  Outcome: Ongoing  Note: Patient will remain free from physical injury. Safety precautions are in place. Will continue to monitor and assess. 11/10/2021 0528 by Wale Tapia RN  Outcome: Ongoing     Problem: Skin Integrity:  Goal: Will show no infection signs and symptoms  Description: Will show no infection signs and symptoms  11/10/2021 1641 by Bethel Thurman RN  Outcome: Ongoing  Note: Pt is free of signs and symptoms of infection. Incision and dressing are clean, dry and intact. Vital signs stable. Will monitor. 11/10/2021 0528 by Wale Tapia RN  Outcome: Ongoing  Goal: Absence of new skin breakdown  Description: Absence of new skin breakdown  11/10/2021 1641 by Bethel Thurman RN  Outcome: Ongoing  Note: Patient will remain free from new skin breakdown. Precautions in place. Will monitor and assess. 11/10/2021 0528 by Wale Tapia RN  Outcome: Ongoing     Problem: SAFETY  Goal: Free from accidental physical injury  11/10/2021 1641 by Bethel Thurman RN  Outcome: Ongoing  Note: Patient will remain free from accidental physical harm. Will monitor and assess. 11/10/2021 0528 by Wale Tapia RN  Outcome: Ongoing  Goal: Free from intentional harm  11/10/2021 1641 by Bethel Thurman RN  Outcome: Ongoing  Note: Patient will remain free from intentional harm. Will monitor and assess.   11/10/2021 0528 by Wale Tapia RN  Outcome: Ongoing Problem: DAILY CARE  Goal: Daily care needs are met  11/10/2021 1641 by Omar Osorio RN  Outcome: Ongoing  Note: Patients daily care needs will be met. Will monitor and assess. 11/10/2021 0528 by Gerry Mccarthy RN  Outcome: Ongoing     Problem: PAIN  Goal: Patient's pain/discomfort is manageable  11/10/2021 1641 by Omar Osorio RN  Outcome: Ongoing  Note: Patients pain/discomfort is manageable. Will monitor and assess. 11/10/2021 0528 by eGrry Mccarthy RN  Outcome: Ongoing     Problem: SKIN INTEGRITY  Goal: Skin integrity is maintained or improved  11/10/2021 1641 by Omar Osorio RN  Outcome: Ongoing  Note: Patients skin integrity is maintained or improved. Will monitor and assess. 11/10/2021 0528 by Gerry Mccarthy RN  Outcome: Ongoing     Problem: KNOWLEDGE DEFICIT  Goal: Patient/S.O. demonstrates understanding of disease process, treatment plan, medications, and discharge instructions. 11/10/2021 1641 by Omar Osorio RN  Outcome: Ongoing  Note: Patient will demonstrate understanding of disease process, treatment plan, medications, and discharge instructions. Will monitor and assess. 11/10/2021 0528 by Gerry Mccarthy RN  Outcome: Ongoing     Problem: DISCHARGE BARRIERS  Goal: Patient's continuum of care needs are met  11/10/2021 1641 by Omar Osorio RN  Outcome: Ongoing  Note: Patient's continuum of care needs are met. Will monitor and assess.   11/10/2021 0528 by Gerry Mccarthy RN  Outcome: Ongoing

## 2021-11-10 NOTE — H&P
Update History & Physical    The patient's History and Physical of November 10, 2021 was reviewed with the patient and I examined the patient. There was no change. The surgical site was confirmed by the patient and me. Plan: The risks, benefits, expected outcome, and alternative to the recommended procedure have been discussed with the patient. Patient understands and wants to proceed with the procedure. Spoke with patient and sister at bedside. Explained risk of spinal cord injury, csf leak, weakness, numbness, pain, injury to nerve or vessel, change in voice or swallow after the procedure. They verbalized understanding and wished to proceed.       Electronically signed by Gail Adams MD on 11/10/2021 at 9:55 AM

## 2021-11-10 NOTE — PROGRESS NOTES
Occupational Therapy    Attempted OT follow-up. Pt had C4-5 anterior cervical diskectomy fusion with decompression per Dr. Stone Hernandez. Will require new therapy orders when medically appropriate.   David Roca, [address] [place] [address]

## 2021-11-10 NOTE — ANESTHESIA PRE PROCEDURE
[facility] Department of Anesthesiology  Pre-Anesthesia Evaluation/Consultation       Name:  Gilbert Stoddard  : 1950  Age:  73 y.o. MRN:  [id number]  Date: 11/10/2021           Surgeon: Surgeon(s):  Omaira Zeng MD    Procedure: Procedure(s):  ANTERIOR CERVICAL DISCECTOMY WITH FUSION, WITH INTERBODY IMPLANT AND WITH PLATE AND SCREW FIXATION C4-5     Allergies   Allergen Reactions    Atorvastatin Other (See Comments)     cpk over  1100 and only expalnation statins     Sulfur Other (See Comments)     Patient Active Problem List   Diagnosis    Generalized weakness     Past Medical History:   Diagnosis Date    Depression     Hyperlipidemia     Hypertension      History reviewed. No pertinent surgical history. Social History     Tobacco Use    Smoking status: Never Smoker    Smokeless tobacco: Never Used   Vaping Use    Vaping Use: Never used   Substance Use Topics    Alcohol use: Not on file    Drug use: Not on file     Medications  No current facility-administered medications on file prior to encounter.      Current Outpatient Medications on File Prior to Encounter   Medication Sig Dispense Refill    DULoxetine (CYMBALTA) 60 MG extended release capsule Take 60 mg by mouth daily      fenofibric acid (FIBRICOR) 135 MG CPDR capsule 135 mg daily       citalopram (CELEXA) 20 MG tablet Take 20 mg by mouth daily       oxybutynin (DITROPAN) 5 MG tablet Take 10 mg by mouth nightly      risperiDONE (RISPERDAL) 1 MG tablet Take 1 mg by mouth 2 times daily      docusate sodium (COLACE) 100 MG capsule Take 100 mg by mouth 2 times daily      Multiple Vitamins-Minerals (THERAPEUTIC MULTIVITAMIN-MINERALS) tablet Take 1 tablet by mouth daily       Current Facility-Administered Medications   Medication Dose Route Frequency Provider Last Rate Last Admin    influenza quadrivalent split vaccine (FLUZONE;FLUARIX;FLULAVAL;AFLURIA) injection 0.5 mL  0.5 mL IntraMUSCular Prior to discharge Peggy Calderón MD        fenofibrate (TRIGLIDE) tablet 160 mg  160 mg Oral Daily CASTILLO Pappas CNP   160 mg at 21 1020    oxybutynin (DITROPAN) tablet 10 mg  10 mg Oral Nightly CASTILLO Pappas CNP   10 mg at 21 2030    sodium chloride flush 0.9 % injection 5-40 mL  5-40 mL IntraVENous 2 times per day CASTILLO Pappas CNP   10 mL at 213    sodium chloride flush 0.9 % injection 5-40 mL  5-40 mL IntraVENous PRN CASTILLO Cox - CNP        0.9 % sodium chloride infusion  25 mL IntraVENous PRN CASTILLO Cox CNP        [Held by provider] enoxaparin (LOVENOX) injection 40 mg  40 mg SubCUTAneous Daily CASTILLO Cox CNP   40 mg at 21 1020    ondansetron (ZOFRAN-ODT) disintegrating tablet 4 mg  4 mg Oral Q8H PRN CASTILLO Cox CNP        Or    ondansetron (ZOFRAN) injection 4 mg  4 mg IntraVENous Q6H PRN CASTILLO Cox - CNP        polyethylene glycol (GLYCOLAX) packet 17 g  17 g Oral Daily PRN CASTILLO Cox - CNP        acetaminophen (TYLENOL) tablet 650 mg  650 mg Oral Q6H PRN CASTILLO Cox CNP        Or    acetaminophen (TYLENOL) suppository 650 mg  650 mg Rectal Q6H PRN CASTILLO Cox - CNP        [Held by provider] DULoxetine (CYMBALTA) extended release capsule 60 mg  60 mg Oral Daily CASTILLO Cox CNP        [Held by provider] citalopram (CELEXA) tablet 20 mg  20 mg Oral Daily CASTILLO Cox CNP        [Held by provider] risperiDONE (RISPERDAL) tablet 1 mg  1 mg Oral BID CASTILLO Cox CNP         Vital Signs (Current)   Vitals:    11/10/21 0901   BP: 132/76   Pulse: 65   Resp: 18   Temp: 97.3 °F (36.3 °C)   SpO2: 92%     Vital Signs Statistics (for past 48 hrs)     Temp  Av.3 °F (36.8 °C)  Min: 97.3 °F (36.3 °C)   Min taken time: 11/10/21 0901  Max: 99.5 °F (37.5 °C)   Max taken time: 21   Pulse  Av.8  Min: 72   Min taken time: 11/10/21 0901  Max: 80   Max taken time: 11/10/21 0415  Resp  Av  Min: 18   Min taken time: 11/10/21 0901  Max: 25   Max taken time: 11/10/21 09  BP  Min: 118/72   Min taken time: 21 1431  Max: 138/73   Max taken time: 21 1620  MAP (mmHg)  Av.6  Min: 80   Min taken time: 21  Max: 95   Max taken time: 21  SpO2  Av.6 %  Min: 92 %   Min taken time: 11/10/21 0901  Max: 98 %   Max taken time: 21 1400    BP Readings from Last 3 Encounters:   11/10/21 132/76     BMI  Body mass index is 30.53 kg/m². Estimated body mass index is 30.53 kg/m² as calculated from the following:    Height as of this encounter: 5' (1.524 m). Weight as of this encounter: 156 lb 4.9 oz (70.9 kg).     CBC   Lab Results   Component Value Date    WBC 8.8 2021    RBC 4.23 2021    HGB 12.5 2021    HCT 37.9 2021    MCV 89.8 2021    RDW 13.8 2021     2021     CMP    Lab Results   Component Value Date     2021    K 4.3 2021     2021    CO2 26 2021    BUN 18 2021    CREATININE 0.8 2021    GFRAA >60 2021    LABGLOM >60 2021    GLUCOSE 93 2021    CALCIUM 10.0 2021     BMP    Lab Results   Component Value Date     2021    K 4.3 2021     2021    CO2 26 2021    BUN 18 2021    CREATININE 0.8 2021    CALCIUM 10.0 2021    GFRAA >60 2021    LABGLOM >60 2021    GLUCOSE 93 2021     POCGlucose  Recent Labs     21  1645   GLUCOSE 93      Coags    Lab Results   Component Value Date    PROTIME 12.0 2021    INR 1.06 2021    APTT 39.0      HCG (If Applicable) No results found for: PREGTESTUR, PREGSERUM, HCG, HCGQUANT   ABGs No results found for: PHART, PO2ART, IVD8PSR, DAY0AEF, BEART, Q5LAHFYL   Type & Screen (If Applicable)  No results found for: Rosalinda Hinojosa                         BMI: Wt Readings from Last 3 Encounters:       NPO Status:   Date of last liquid consumption: 11/09/21   Time of last liquid consumption: 2100   Date of last solid food consumption: 11/09/21      Time of last solid consumption: 1800       Anesthesia Evaluation  Patient summary reviewed no history of anesthetic complications:   Airway: Mallampati: III  TM distance: >3 FB   Neck ROM: limited   Dental:    (+) edentulous      Pulmonary:Negative Pulmonary ROS and normal exam                               Cardiovascular:  Exercise tolerance: good (>4 METS),   (+) hypertension (ef 60):,       ECG reviewed  Rhythm: regular  Rate: normal  Echocardiogram reviewed         Beta Blocker:  Not on Beta Blocker         Neuro/Psych:   (+) neuromuscular disease (pain in BLE):, psychiatric history:depression/anxiety             GI/Hepatic/Renal: Neg GI/Hepatic/Renal ROS       (-) GERD       Endo/Other: Negative Endo/Other ROS                    Abdominal:             Vascular: negative vascular ROS. Other Findings:             Anesthesia Plan      general     ASA 3     (Sister present for interview.)  Induction: intravenous. MIPS: Postoperative opioids intended and Prophylactic antiemetics administered. Anesthetic plan and risks discussed with patient and sibling. Plan discussed with CRNA. This pre-anesthesia assessment may be used as a history and physical.    DOS STAFF ADDENDUM:    Pt seen and examined, chart reviewed (including anesthesia, drug and allergy history). No interval changes to history and physical examination. Anesthetic plan, risks, benefits, alternatives, and personnel involved discussed with patient. Questions and concerns addressed. Patient(family) verbalized an understanding and agrees to proceed.       John Larsen MD  November 10, 2021  9:16 AM

## 2021-11-10 NOTE — FLOWSHEET NOTE
Pt prepped for surgery. A&Ox4. Consent signed and placed in soft chart. NPO since midnight PIV to R hand flushed, patent. Checklist complete. Report called to No. All belongings removed except bilateral hearing aides (preop RN made aware.) Sister, Bernabe Moore, at bedside. Pt transferred to [facility] via bed.   Electronically signed by Shannen Kilgore RN on 11/10/2021 at 9:37 AM

## 2021-11-10 NOTE — ANESTHESIA POSTPROCEDURE EVALUATION
Department of Anesthesiology  Postprocedure Note    Patient: Harmeet Bazan  MRN: [id number]  YOB: 1950  Date of evaluation: 11/10/2021  Time:  5:38 PM     Procedure Summary     Date: 11/10/21 Room / Location: [address] [place] [address] / [facility]    Anesthesia Start: 8616 Anesthesia Stop: 9865    Procedure: ANTERIOR CERVICAL DISCECTOMY WITH FUSION, WITH INTERBODY IMPLANT AND WITH PLATE AND SCREW FIXATION C4-5 (N/A Spine Cervical) Diagnosis:       Cervical myelopathy ([facility] [address]      (CERVICAL MYELOPATHY)    Surgeons: Danitza Booth MD Responsible Provider: Estella Roa MD    Anesthesia Type: general ASA Status: 3          Anesthesia Type: general    Collin Phase I: Collin Score: 8    Collin Phase II:      Last vitals: Reviewed and per EMR flowsheets.        Anesthesia Post Evaluation    Patient location during evaluation: PACU  Patient participation: complete - patient participated  Level of consciousness: awake and alert  Pain score: 4  Airway patency: patent  Nausea & Vomiting: no nausea and no vomiting  Complications: no  Cardiovascular status: blood pressure returned to baseline  Respiratory status: acceptable  Hydration status: euvolemic

## 2021-11-10 NOTE — FLOWSHEET NOTE
Consent signed for procedure 11/10/2021. Pt A&Ox4.   Electronically signed by Kirsten Barrios RN on 11/9/2021 at 7:38 PM

## 2021-11-10 NOTE — PROGRESS NOTES
Physical Therapy  Daily Treatment Attempt    11/10/21    Name: Kirk Chilel   : 1950    MRN: [id number]    PT follow-up attempt. Patient found to have cervical spinal cord compression - undergoing C4-5 anterior cervical diskectomy fusion with decompression and stabilization per Dr. Ermias Kc on 11/10/21. Will require new therapy orders when medically appropriate. PT signing off.     Electronically signed by Michael Hahn PT on 11/10/2021 at 7:31 AM

## 2021-11-11 PROCEDURE — 6370000000 HC RX 637 (ALT 250 FOR IP): Performed by: NEUROLOGICAL SURGERY

## 2021-11-11 PROCEDURE — 2580000003 HC RX 258: Performed by: NEUROLOGICAL SURGERY

## 2021-11-11 PROCEDURE — 1200000000 HC SEMI PRIVATE

## 2021-11-11 PROCEDURE — 94761 N-INVAS EAR/PLS OXIMETRY MLT: CPT

## 2021-11-11 PROCEDURE — 97163 PT EVAL HIGH COMPLEX 45 MIN: CPT

## 2021-11-11 PROCEDURE — 97535 SELF CARE MNGMENT TRAINING: CPT

## 2021-11-11 PROCEDURE — 97116 GAIT TRAINING THERAPY: CPT

## 2021-11-11 PROCEDURE — 2700000000 HC OXYGEN THERAPY PER DAY

## 2021-11-11 PROCEDURE — 97166 OT EVAL MOD COMPLEX 45 MIN: CPT

## 2021-11-11 PROCEDURE — 97530 THERAPEUTIC ACTIVITIES: CPT

## 2021-11-11 RX ADMIN — SODIUM CHLORIDE, PRESERVATIVE FREE 10 ML: 5 INJECTION INTRAVENOUS at 08:20

## 2021-11-11 RX ADMIN — METHOCARBAMOL TABLETS 750 MG: 750 TABLET, COATED ORAL at 15:07

## 2021-11-11 RX ADMIN — SODIUM CHLORIDE, PRESERVATIVE FREE 10 ML: 5 INJECTION INTRAVENOUS at 21:56

## 2021-11-11 RX ADMIN — OXYBUTYNIN CHLORIDE 10 MG: 5 TABLET ORAL at 21:52

## 2021-11-11 RX ADMIN — OXYCODONE 5 MG: 5 TABLET ORAL at 06:12

## 2021-11-11 RX ADMIN — OXYCODONE 5 MG: 5 TABLET ORAL at 02:07

## 2021-11-11 RX ADMIN — METHOCARBAMOL TABLETS 750 MG: 750 TABLET, COATED ORAL at 21:52

## 2021-11-11 RX ADMIN — ACETAMINOPHEN 650 MG: 325 TABLET ORAL at 21:52

## 2021-11-11 RX ADMIN — OXYCODONE 5 MG: 5 TABLET ORAL at 11:07

## 2021-11-11 RX ADMIN — OXYCODONE 5 MG: 5 TABLET ORAL at 15:10

## 2021-11-11 RX ADMIN — OXYCODONE 5 MG: 5 TABLET ORAL at 21:52

## 2021-11-11 RX ADMIN — METHOCARBAMOL TABLETS 750 MG: 750 TABLET, COATED ORAL at 08:20

## 2021-11-11 RX ADMIN — FENOFIBRATE 160 MG: 160 TABLET ORAL at 08:20

## 2021-11-11 ASSESSMENT — PAIN DESCRIPTION - ORIENTATION
ORIENTATION: ANTERIOR
ORIENTATION: RIGHT;ANTERIOR

## 2021-11-11 ASSESSMENT — PAIN DESCRIPTION - ONSET
ONSET: ON-GOING

## 2021-11-11 ASSESSMENT — PAIN - FUNCTIONAL ASSESSMENT
PAIN_FUNCTIONAL_ASSESSMENT: PREVENTS OR INTERFERES SOME ACTIVE ACTIVITIES AND ADLS

## 2021-11-11 ASSESSMENT — PAIN DESCRIPTION - PAIN TYPE
TYPE: ACUTE PAIN;SURGICAL PAIN
TYPE: SURGICAL PAIN
TYPE: ACUTE PAIN;SURGICAL PAIN
TYPE: SURGICAL PAIN
TYPE: ACUTE PAIN;SURGICAL PAIN
TYPE: ACUTE PAIN;SURGICAL PAIN

## 2021-11-11 ASSESSMENT — PAIN DESCRIPTION - PROGRESSION
CLINICAL_PROGRESSION: NOT CHANGED

## 2021-11-11 ASSESSMENT — PAIN SCALES - GENERAL
PAINLEVEL_OUTOF10: 2
PAINLEVEL_OUTOF10: 3
PAINLEVEL_OUTOF10: 4
PAINLEVEL_OUTOF10: 0
PAINLEVEL_OUTOF10: 4
PAINLEVEL_OUTOF10: 4
PAINLEVEL_OUTOF10: 0
PAINLEVEL_OUTOF10: 4
PAINLEVEL_OUTOF10: 3
PAINLEVEL_OUTOF10: 4

## 2021-11-11 ASSESSMENT — PAIN DESCRIPTION - FREQUENCY
FREQUENCY: CONTINUOUS

## 2021-11-11 ASSESSMENT — PAIN DESCRIPTION - LOCATION
LOCATION: NECK

## 2021-11-11 ASSESSMENT — PAIN SCALES - WONG BAKER
WONGBAKER_NUMERICALRESPONSE: 2
WONGBAKER_NUMERICALRESPONSE: 4

## 2021-11-11 NOTE — PLAN OF CARE
Problem: Falls - Risk of:  Goal: Will remain free from falls  Description: Will remain free from falls  11/11/2021 0040 by Coby Velazquez RN  Outcome: Ongoing     Problem: Falls - Risk of:  Goal: Absence of physical injury  Description: Absence of physical injury  11/11/2021 0040 by Coby Velazquez RN  Outcome: Ongoing   Fall risk assessment completed every shift. All precautions in place. Pt has call light within reach at all times. Room clear of clutter. Pt aware to call for assistance when getting up. Problem: Skin Integrity:  Goal: Will show no infection signs and symptoms  Description: Will show no infection signs and symptoms  11/11/2021 0040 by Coby Velazquez RN  Outcome: Ongoing     Problem: Skin Integrity:  Goal: Absence of new skin breakdown  Description: Absence of new skin breakdown  11/11/2021 0040 by Coby Velazquez RN  Outcome: Ongoing   Skin assessment completed every shift. Pt assessed for incontinence, appropriate barrier wipes used prn. Pt encouraged to turn/rotate every 2 hours. Assistance provided if pt unable to do so themselves. Problem: SAFETY  Goal: Free from accidental physical injury  11/11/2021 0040 by Coby Velazquez RN  Outcome: Ongoing     Problem: SAFETY  Goal: Free from intentional harm  11/11/2021 0040 by Coby Velazquez RN  Outcome: Ongoing     Problem: DAILY CARE  Goal: Daily care needs are met  11/11/2021 0040 by Coby Velazquez RN  Outcome: Ongoing   Daily care needs are being met. Problem: PAIN  Goal: Patient's pain/discomfort is manageable  11/11/2021 0040 by Coby Velazquez RN  Outcome: Ongoing     Problem: SKIN INTEGRITY  Goal: Skin integrity is maintained or improved  11/11/2021 0040 by Coby Velazquez RN  Outcome: Ongoing     Problem: KNOWLEDGE DEFICIT  Goal: Patient/S.O. demonstrates understanding of disease process, treatment plan, medications, and discharge instructions.   11/11/2021 0040 by Coby Velazquez RN  Outcome: Ongoing     Problem: DISCHARGE BARRIERS  Goal: Patient's continuum of care needs are met  11/11/2021 0040 by Joyce Garay RN  Outcome: Ongoing

## 2021-11-11 NOTE — PROGRESS NOTES
Pt AAO x4. Rates pain 4/10 on pain scale in anterior neck. Not due for pain meds at this time. Ice pack applied and scheduled Robaxin given. Assessment completed and charted. Radial and pedal pulses palpable. Pt able to move all extremities. Not able to wiggle toes, but can move both feet. Dressing to right anterior neck CDI. No c/o numbness or tingling. Speech difficult to understand at times. Pt denies any needs. Call light in reach. Will monitor.

## 2021-11-11 NOTE — PROGRESS NOTES
Patient is sitting in the chair eating breakfast. Patient is alert and oriented x4. Patient is complaining of minimal pain in her neck. PRN pain medication given by night shift nurse. Will continue to give as needed. Patient got up to the restroom using a steady x1. Will attempt walker when getting patient from the chair back to bed later today. Dressing is clean, dry, and intact. Neuro checks are WDL for patients normal. Patient weaned down to 1.5L O2 via nasal cannula. Morning medication given and head to toe assessment is complete. All needs are met and safety precautions are in place. Will continue to monitor and assess.   Electronically signed by Goran Estrada RN on 11/11/2021 at 8:44 AM

## 2021-11-11 NOTE — PROGRESS NOTES
Hospitalist  Progress Note       Ashley Ashraf is a 73 y.o. female   1950     SUBJECTIVE:   Patient was brought to the emergency department by her sister, with whom she lives with for admission regarding debility, inability to walk, generalized weakness, as noted above per the PCP recommendations. The sister has been working with [facility] for placement, apparently they do have a bed for her but they also requested that she be taken to an emergency department and be admitted for 3-day for placement. Patient denies chest pain, shortness of breath, nausea, vomiting, diarrhea. Denies loss of appetite. Denies a history of stroke. Denies any change in medications. Denies cough or cold. She just knows that she is not able to perform her ADLs, use her walker and her sister is not able to help her any longer as well and her care is exceeding the ability for her to stay in the home. No recent falls or injuries. 11/11  Patient diagnosed with a CVA CNS cord compression underwent anterior cervical discectomy with fusion and fixation of C4-5  This morning patient states she is doing okay not in a lot of pain ,discussed with nurse     OBJECTIVE:    Review of Systems:  General appearance: alert, appears stated age and cooperative  Skin: Skin color, texture, normal. No rashes or lesions  HEENT: No nose bleed, headache, vision problems  CV: C/O chest pain, tightness, pressure,   Respiratory: C/o no SOB, SANTOS, Orthopnea, PND  GI: No abdominal pain, black stool, bloating  Limbs: No c/o edema, pain, swelling, intermittent claudication, joint pains  Neuro: No dizziness, lightheadedness, syncope, gait problems, memory problems  Psych: grossly normal. No SI/depression. Vitals:   Blood pressure 133/65, pulse 87, temperature 98.2 °F (36.8 °C), temperature source Oral, resp. rate 15, height 5' (1.524 m), weight 160 lb 4.4 oz (72.7 kg), SpO2 95 %.     HEENT: AT, NC, PERRLA  Neck: No JVD  Heart: S1 S2 audible, no murmur Lungs: CTA   Abdomen: Nontender   Limbs: No edema   CNS: no focal deficit      Past Medical History:   Diagnosis Date    Depression     Hyperlipidemia     Hypertension         Patient Active Problem List   Diagnosis    Generalized weakness        Allergies   Allergen Reactions    Atorvastatin Other (See Comments)     cpk over  1100 and only expalnation statins     Sulfur Other (See Comments)        Current Inpatient Medications:    Current Facility-Administered Medications   Medication Dose Route Frequency Provider Last Rate Last Admin    sodium chloride flush 0.9 % injection 5-40 mL  5-40 mL IntraVENous 2 times per day Lynette Burns MD   10 mL at 11/10/21 2033    sodium chloride flush 0.9 % injection 5-40 mL  5-40 mL IntraVENous PRN Lynette Burns MD        0.9 % sodium chloride infusion  25 mL IntraVENous PRN Lynette Burns MD        ondansetron (ZOFRAN-ODT) disintegrating tablet 4 mg  4 mg Oral Q8H PRN Lynette Burns MD        Or    ondansetron [facility]) injection 4 mg  4 mg IntraVENous Q6H PRN Lynette Burns MD        methocarbamol (ROBAXIN) tablet 750 mg  750 mg Oral TID Lynette Bunrs MD   750 mg at 11/10/21 2032    oxyCODONE (ROXICODONE) immediate release tablet 5 mg  5 mg Oral Q4H PRN Lynette Burns MD   5 mg at 11/11/21 2568    Or    oxyCODONE HCl (OXY-IR) immediate release tablet 10 mg  10 mg Oral Q4H PRN Lynette Burns MD        aluminum & magnesium hydroxide-simethicone (MAALOX) 200-200-20 MG/5ML suspension 15 mL  15 mL Oral Q6H PRN Lynette Burns MD        fenofibrate (TRIGLIDE) tablet 160 mg  160 mg Oral Daily Lynette Burns MD   160 mg at 11/09/21 1020    oxybutynin (DITROPAN) tablet 10 mg  10 mg Oral Nightly Lynette Burns MD   10 mg at 11/10/21 2032    [Held by provider] enoxaparin (LOVENOX) injection 40 mg  40 mg SubCUTAneous Daily Lynette Burns MD   40 mg at 11/09/21 1020    polyethylene glycol (GLYCOLAX) packet 17 g  17 g Oral Daily PRN Kaylyn Cote MD        acetaminophen (TYLENOL) tablet 650 mg  650 mg Oral Q6H PRN Kaylyn Cote MD        Or   [facility] acetaminophen (TYLENOL) suppository 650 mg  650 mg Rectal Q6H PRN Kaylyn Cote MD        [Held by provider] DULoxetine (CYMBALTA) extended release capsule 60 mg  60 mg Oral Daily Kaylyn Cote MD        [Held by provider] citalopram (CELEXA) tablet 20 mg  20 mg Oral Daily Kaylyn Cote MD        [Held by provider] risperiDONE (RISPERDAL) tablet 1 mg  1 mg Oral BID Kaylyn Cote MD               Labs:  CBC with Differential:    Lab Results   Component Value Date    WBC 8.8 11/07/2021    RBC 4.23 11/07/2021    HGB 12.5 11/07/2021    HCT 37.9 11/07/2021     11/07/2021    MCV 89.8 11/07/2021    MCH 29.6 11/07/2021    MCHC 33.0 11/07/2021    RDW 13.8 11/07/2021    LYMPHOPCT 37.6 11/07/2021    MONOPCT 7.8 11/07/2021    BASOPCT 0.7 11/07/2021    MONOSABS 0.7 11/07/2021    LYMPHSABS 3.3 11/07/2021    EOSABS 0.1 11/07/2021    BASOSABS 0.1 11/07/2021     CMP:    Lab Results   Component Value Date     11/07/2021    K 4.3 11/07/2021     11/07/2021    CO2 26 11/07/2021    BUN 18 11/07/2021    CREATININE 0.8 11/07/2021    GFRAA >60 11/07/2021    LABGLOM >60 11/07/2021    GLUCOSE 93 11/07/2021    CALCIUM 10.0 11/07/2021     Hepatic Function Panel:  No results found for: ALKPHOS, ALT, AST, PROT, BILITOT, BILIDIR, IBILI, LABALBU  Magnesium:  No results found for: MG  PT/INR:    Lab Results   Component Value Date    PROTIME 12.0 11/09/2021    INR 1.06 11/09/2021     Last 3 Troponin:    Lab Results   Component Value Date    TROPONINI <0.01 11/07/2021     U/A:    Lab Results   Component Value Date    COLORU YELLOW 11/07/2021    PHUR 7.5 11/07/2021    WBCUA 1 11/07/2021    RBCUA 1 11/07/2021    BACTERIA 1+ 11/07/2021    CLARITYU CLOUDY 11/07/2021    SPECGRAV 1.011 11/07/2021 LEUKOCYTESUR Negative 11/07/2021    UROBILINOGEN 0.2 11/07/2021    BILIRUBINUR Negative 11/07/2021    BLOODU Negative 11/07/2021    GLUCOSEU Negative 11/07/2021     ABG:  No results found for: PHART, SHA3GFT, PO2ART, FNJ0ZII, BEART, THGBART, EMM1ITY, B1UITQHL  FLP:  No results found for: TRIG, HDL, LDLCALC, LDLDIRECT, LABVLDL  TSH:  No results found for: TSH   DATA:   ECG: Sinus Rhythm       ASSESSMENT:   1 severe CNS cord compression presented with generalized weakness status post anterior cervical discectomy with fusion and fixation of C4-5  Patient doing well  2 underlying cerebral palsy able to communicate  3 depression  PLAN   Neurology and neurosurgery follow-up recommendation

## 2021-11-11 NOTE — PROGRESS NOTES
[address] Day: 5   Diagnosis: Cervical myelopathy, cervical disc herniation with cord compression, severe canal stenosis. Large central and leftward disc hernation at C4-5. History of cerebral palsy  POD #1  Operation: ANTERIOR CERVICAL DISCECTOMY WITH FUSION, WITH INTERBODY IMPLANT AND WITH PLATE AND SCREW FIXATION C4-5  /65   Pulse 87   Temp 98.2 °F (36.8 °C) (Oral)   Resp 15   Ht 5' (1.524 m)   Wt 160 lb 4.4 oz (72.7 kg)   SpO2 95%   BMI 31.30 kg/m²     Patient admitted with progressive weakness and decline in function/ambulation. History of cerebral palsy and UTI. Imaging uncovered a large central and leftward disc hernation at C4-5. She underwent surgery yesterday. No collar. Up in the chair. States she stood up better and her leg felt stronger. Patient moved to the chair with the stedy. Can lift and hold her cup with both the right and left hand, but Rt HG is stronger than the left. Eating and urinating without difficulty. Voice and speech is at her baseline. Pre-operative weakness is felt to be improved. Complaining of \"scratchy throat\" and soreness with swallowing. Pain controlled on current medications, Oxycodone 5 mg and Robaxin. Denies arm pain, numbness or tingling. Reports tingling in the feet, not new. PT/OT ordered. Therapist felt she would benefit from acute rehab. Her sister is at the bedside and plan is for her to return home with family.  spoke with Mookie Porter, sister and medical POA, and confirmed plan to return home. Moves all extremities, R>L. Limites proximal ROM in the upper extremities. Contracture and spasticity in LLE. Sensation intact in all dermatomes bilateral upper and lower extremities. Dressing clean, dry and intact. Labs reviewed. Med list reviewed. Plan:  Continue PT/OT. Will ask for acute rehab evaluation. If accepted, can go to rehab. Otherwise, can be discharged when cleared by admitting service. Discharge instructions/return appts and order for Xrays on file. Spoke with her sister, Gilda Howell, at the bedside.

## 2021-11-11 NOTE — CONSULTS
Department of [facility] Progress Note  11/11/2021  3:20 PM    Patient Name:   Cesilia Lopez  YOB: 1950    Diagnosis: Cervical spinal cord compression with decompression surgery, 11/10      Subjective: Rehab consult received. Chart reviewed. Patient discussed with our rehab unit admission nurse. 51-year-old female patient with cerebral palsy with recent history of increasing debility, inability to walk, generalized weakness. Patient states that she was not able to perform her ADLs, use her walker and her sister is not able to help her any longer as well. Work-up revealed the patient had severe cervical spinal cord compression due to large disc herniation and osteophytes at the C4-5 level. Patient was taken to surgery yesterday where she underwent anterior cervical discectomy with fusion and fixation of C4-5 per Dr. Brigida Gowers. Patient started in therapies today. Patient lives at home with her sister in a two-level house. Patient has regular Medicare insurance. /63   Pulse 88   Temp 97.6 °F (36.4 °C) (Oral)   Resp 16   Ht 5' (1.524 m)   Wt 160 lb 4.4 oz (72.7 kg)   SpO2 93%   BMI 31.30 kg/m²     Last 24 hour lab  No results found for this or any previous visit (from the past 24 hour(s)). Plan: We will follow, see how she progresses.       Dr. Isatu Bills

## 2021-11-11 NOTE — PLAN OF CARE
Problem: Falls - Risk of:  Goal: Will remain free from falls  Description: Will remain free from falls  11/11/2021 0742 by Goran Estrada RN  Outcome: Ongoing  Note: Fall risk assessment completed. Fall precautions in place. Call light within reach. Pt educated on calling for assistance before getting up. Walkway free of clutter. Will continue to monitor. 11/11/2021 0040 by Tani Shipley RN  Outcome: Ongoing  Goal: Absence of physical injury  Description: Absence of physical injury  11/11/2021 0742 by Goran Estrada RN  Outcome: Ongoing  Note: Patient will remain free from physical injury. Safety precautions are in place. Will continue to monitor and assess. 11/11/2021 0040 by Tani Shipley RN  Outcome: Ongoing     Problem: Skin Integrity:  Goal: Will show no infection signs and symptoms  Description: Will show no infection signs and symptoms  11/11/2021 0742 by Goran Estrada RN  Outcome: Ongoing  Note: Pt is free of signs and symptoms of infection. Incision and dressing are clean, dry and intact. Vital signs stable. Will monitor. 11/11/2021 0040 by Tani Shipley RN  Outcome: Ongoing  Goal: Absence of new skin breakdown  Description: Absence of new skin breakdown  11/11/2021 0742 by Goran Estrada RN  Outcome: Ongoing  Note: Patient will remain free from new skin breakdown. Precautions in place. Will monitor and assess. 11/11/2021 0040 by Tani Shipley RN  Outcome: Ongoing     Problem: SAFETY  Goal: Free from accidental physical injury  11/11/2021 0742 by Goran Estrada RN  Outcome: Ongoing  Note: Patient will remain free from accidental physical harm. Will monitor and assess. 11/11/2021 0040 by Tani Shipley RN  Outcome: Ongoing  Goal: Free from intentional harm  11/11/2021 0742 by Goran Estrada RN  Outcome: Ongoing  Note: Patient will remain free from intentional harm. Will monitor and assess.   11/11/2021 0040 by Tani Shipley RN  Outcome: Ongoing     Problem: DAILY CARE  Goal: Daily care needs are met  11/11/2021 0742 by Jarocho Delgado RN  Outcome: Ongoing  Note: Daily care needs are met. Will monitor and assess. 11/11/2021 0040 by Willian Up RN  Outcome: Ongoing     Problem: PAIN  Goal: Patient's pain/discomfort is manageable  11/11/2021 0742 by Jarocho Delgado RN  Outcome: Ongoing  Note: Patients pain/discomfort is manageable. Will monitor and assess. 11/11/2021 0040 by Willian Up RN  Outcome: Ongoing     Problem: SKIN INTEGRITY  Goal: Skin integrity is maintained or improved  11/11/2021 0742 by Jarocho Delgado RN  Outcome: Ongoing  Note: Patients skin integrity is maintained or improved. Will monitor and assess. 11/11/2021 0040 by Willian Up RN  Outcome: Ongoing     Problem: KNOWLEDGE DEFICIT  Goal: Patient/S.O. demonstrates understanding of disease process, treatment plan, medications, and discharge instructions. 11/11/2021 0742 by Jarocho Delgado RN  Outcome: Ongoing  Note: Patient demonstrates understanding of disease process, treatment plan, medications, and discharge instructions. Will monitor and assess. 11/11/2021 0040 by Willian Up RN  Outcome: Ongoing     Problem: DISCHARGE BARRIERS  Goal: Patient's continuum of care needs are met  11/11/2021 0742 by Jarocho Delgado RN  Outcome: Ongoing  Note: Patients continuum of care needs are met. Will monitor and assess.   11/11/2021 0040 by Willian Up RN  Outcome: Ongoing

## 2021-11-11 NOTE — PROGRESS NOTES
Physical Therapy    Facility/Department: [address] [facility]    Reassessment following cervical decompression and fusion    NAME: Gilbert Stoddard  : 1950  MRN: [id number]    Date of Service: 2021    Assessment / Discharge Recommendations:  -good start with initial efforts up from recliner to walker to ambulate in room  -recommend continued PTOT and nursing care in a skilled setting   -anticipate will tolerate and benefit from a high frequency of sessions with goal to return to home    Gilbert Stoddard scored a 13/ on the AM-PAC short mobility form. Current research shows that an AM-PAC score of 17 or less is typically not associated with a discharge to the patient's home setting. Based on the patient's AM-PAC score and their current functional mobility deficits, it is recommended that the patient have 5-7 sessions per week of Physical Therapy at d/ to increase the patient's independence. Body structures, Functions, Activity limitations: Decreased functional mobility ; Decreased ADL status; Decreased strength; Decreased balance; Decreased ROM  Activity Tolerance  Activity Tolerance: Patient Tolerated treatment well; Patient limited by endurance       Patient Diagnosis(es): The primary encounter diagnosis was Weakness of both lower extremities. A diagnosis of Unable to ambulate was also pertinent to this visit. has a past medical history of Depression, Hyperlipidemia, and Hypertension. has a past surgical history that includes cervical fusion (N/A, 11/10/2021).     Restrictions  Restrictions/Precautions  Restrictions/Precautions: Fall Risk  Position Activity Restriction  Other position/activity restrictions: ACDF precautions  Vision/Hearing  Vision: Impaired  Vision Exceptions: Wears glasses at all times  Hearing: Exceptions to [facility]  Hearing Exceptions: Hard of hearing/hearing concerns; Bilateral hearing aid     Subjective  General  Chart Reviewed: Yes  Patient assessed for rehabilitation services?: Yes  Additional Pertinent Hx: Pt is a 73 y.o. female with hx of CP who presented to the ED on 11/7/21 with progressive generalized weakness and debility. Response To Previous Treatment: Patient with no complaints from previous session. Family / Caregiver Present: Yes  Follows Commands: Within Functional Limits  Subjective  Subjective: arrived to room along with OT to patient resting in recliner - napping - awakens to voice (up close as she is [place])   -agreeable to PTOT reassessments  Pain Screening  Patient Currently in Pain:  (moderate discomfort at surgical site and when swallowing)  Social/Functional History  Social/Functional History  Lives With:  (sister and NICK)  Type of Home: House  Home Layout: Two level, Bed/Bath upstairs, 1/2 bath on main level (2 + basement)  Home Access: Stairs to enter without rails  Entrance Stairs - Number of Steps: 1 thru garage + flight upstairs to bedroom with one rail  Bathroom Shower/Tub: Walk-in shower, Shower chair with back (showers in basement)  Bathroom Toilet: Standard (uses towel bar to stand sometimes)  Bathroom Equipment: Shower chair  Bathroom Accessibility: Accessible  Home Equipment: Rolling walker, [employer]  ADL Assistance: Independent  Homemaking Assistance:  (microwave meals, family assist with heaving cleaning and laundry)  Ambulation Assistance: Independent (RW in home, w/c in community (sister pushes w/c))  Transfer Assistance: Independent  Additional Comments: Sister works during the day and pt home alone.  Pt denies falls  Objective  Motor Control  Gross Motor?: WFL (chart notes indicate prior spasticity in left LE   - not assessed formally at this session)  Transfers  Sit to Stand: Minimal Assistance (cues to use the arm rests - appears her habit is to grab the walker and pull up to stance)  Stand to sit: Minimal Assistance  Ambulation  Ambulation?: Yes  Ambulation 1  Surface: level tile  Device: Rolling Walker  Assistance: Minimal

## 2021-11-11 NOTE — PROGRESS NOTES
Occupational Therapy   Occupational Therapy Initial Re-Assessment/Treatment   Date: 2021   Patient Name: Jackson Sherman  MRN: [id number]     : 1950    Date of Service: 2021    Discharge Recommendations:  5-7 sessions per week  OT Equipment Recommendations  Other[facility] scored a 14/24 on the AM-PAC ADL Inpatient form. Current research shows that an AM-PAC score of 17 or less is typically not associated with a discharge to the patient's home setting. Based on the patient's AM-PAC score and their current ADL deficits, it is recommended that the patient have 5-7 sessions per week of Occupational Therapy at d/c to increase the patient's independence. At this time, this patient demonstrates the endurance, and/or tolerance for 3 hours of therapy each day, with a treatment frequency of 5-7x/wk. Please see assessment section for further patient specific details. If patient discharges prior to next session this note will serve as a discharge summary. Please see below for the latest assessment towards goals. Assessment   Performance deficits / Impairments: Decreased functional mobility ; Decreased strength; Decreased ADL status; Decreased safe awareness; Decreased endurance; Decreased balance; Decreased high-level IADLs  Assessment: Pt is a 79yr old female admitted for general weakness on . Further imaging uncovered a large central and leftward disc hernation at C4-5. Pt now s/p cervical fusion. Pt reports baseline independence with ADLs using walker. Pt currently functioning below baseline due to deficits in strength, balance, activity tolerance, mobility, and ADLs. Pt required min A for mobility and transfers and up to max A for ADLs. Anticipate pt will require ongoing OT in order to increase functional status prior to returning home.   Prognosis: Good  Decision Making: Medium Complexity  OT Education: Plan of Care; OT Role; Transfer Training; ADL Adaptive bath on main level (2 + basement)  Home Access: Stairs to enter without rails  Entrance Stairs - Number of Steps: 1 thru garage + flight upstairs to bedroom with one rail  Bathroom Shower/Tub: Walk-in shower, Shower chair with back (showers in basement)  Bathroom Toilet: Standard (uses towel bar to stand sometimes)  Bathroom Equipment: Shower chair  Bathroom Accessibility: Accessible  Home Equipment: Rolling walker, [employer]  ADL Assistance: Independent  Homemaking Assistance:  (microwave meals, family assist with heaving cleaning and laundry)  Ambulation Assistance: Independent (RW in home, w/c in community (sister pushes w/c))  Transfer Assistance: Independent  Additional Comments: Sister works during the day and pt home alone. Pt denies falls       Objective        Orientation  Overall Orientation Status: Within Functional Limits     Balance  Sitting Balance: Contact guard assistance  Standing Balance: Minimal assistance  Functional Mobility  Functional - Mobility Device: Rolling Walker  Activity: To/from bathroom  Assist Level: Minimal assistance  Functional Mobility Comments: MIn A to bathroom using RW; Pt too fatigued to ambulate back to chair; Required sammie ebonie back to chair  Toilet Transfers  Toilet - Technique: Ambulating  Equipment Used: Standard toilet  Toilet Transfer: Minimal assistance  Toilet Transfers Comments: + grab bar  ADL  LE Dressing: Dependent/Total (donning brief)  Toileting: Maximum assistance (Pt able to assist with hygiene while in stance; OT assisted with LB brief management)  Tone RUE  RUE Tone: Normotonic  Tone LUE  LUE Tone: Hypertonic  Coordination  Movements Are Fluid And Coordinated: No  Coordination and Movement description: Left UE; Decreased accuracy; Gross motor impairments;  Fine motor impairments; Decreased speed  Quality of Movement Other  Comment: hx of CP; increased tone noted resulting in fine motor impairments     Bed mobility  Comment: NT; pt up in chair at start and end of session  Transfers  Sit to stand: Minimal assistance  Stand to sit: Minimal assistance  Transfer Comments: max cues for hand placement on RW     Cognition  Overall Cognitive Status: Exceptions (hard of hearing)  Arousal/Alertness: Appropriate responses to stimuli  Following Commands: Follows one step commands with increased time; Follows one step commands with repetition  Attention Span: Appears intact  Memory: Decreased short term memory  Problem Solving: Assistance required to generate solutions; Assistance required to implement solutions; Decreased awareness of errors  Initiation: Requires cues for some        Sensation  Overall Sensation Status:  (pt reports minimal numbness/tingling to BLEs feet)        LUE AROM (degrees)  LUE AROM : WFL  RUE AROM (degrees)  RUE AROM : WFL  LUE Strength  LUE Strength Comment: LUE<RUE; not formally assessed  RUE Strength  RUE Strength Comment: LUE<RUE; not formally assessed                   Plan   Plan  Times per week: 3-5  Current Treatment Recommendations: Strengthening, Functional Mobility Training, Gait Training, Endurance Training, Balance Training, ROM, Self-Care / ADL, Patient/Caregiver Education & Training, Safety Education & Training      AM-PAC Score        -[facility] Inpatient Daily Activity Raw Score: 14 (11/11/21 1442)  -PAC Inpatient ADL T-Scale Score : 33.39 (11/11/21 1442)  ADL Inpatient CMS 0-100% Score: 59.67 (11/11/21 1442)  ADL Inpatient CMS G-Code Modifier : CK (11/11/21 1442)    Goals  Short term goals  Time Frame for Short term goals: Prior to DC:   Short term goal 1: Pt will complete ADL transfers with supervision  Short term goal 2: Pt will complete functional mobility with supervision  Short term goal 3: Pt will complete toileting with supervision  Short term goal 4: Pt will complete LB dressing with supervision  Short term goal 5: Pt will tolerate standing > 5 min for functional task with SBA  Patient Goals   Patient goals : to get stronger       Therapy Time   Individual Concurrent Group Co-treatment   Time In 1345         Time Out 1434         Minutes 49         Timed Code Treatment Minutes: 39 Minutes (10 minute re-eval)       Sana Woods OTR/L

## 2021-11-12 ENCOUNTER — HOSPITAL ENCOUNTER (INPATIENT)
Age: 71
LOS: 13 days | Discharge: HOME HEALTH | DRG: 560 | End: 2021-11-25
Attending: OBSTETRICS & GYNECOLOGY | Admitting: PHYSICAL MEDICINE & REHABILITATION
Payer: MEDICARE

## 2021-11-12 VITALS
DIASTOLIC BLOOD PRESSURE: 69 MMHG | HEIGHT: 60 IN | TEMPERATURE: 98.3 F | SYSTOLIC BLOOD PRESSURE: 132 MMHG | RESPIRATION RATE: 18 BRPM | WEIGHT: 162.26 LBS | OXYGEN SATURATION: 98 % | HEART RATE: 79 BPM | BODY MASS INDEX: 31.86 KG/M2

## 2021-11-12 PROBLEM — M48.02 STENOSIS OF CERVICAL SPINE WITH MYELOPATHY (HCC): Status: ACTIVE | Noted: 2021-11-12

## 2021-11-12 PROBLEM — G99.2 STENOSIS OF CERVICAL SPINE WITH MYELOPATHY (HCC): Status: ACTIVE | Noted: 2021-11-12

## 2021-11-12 PROCEDURE — 6370000000 HC RX 637 (ALT 250 FOR IP): Performed by: NEUROLOGICAL SURGERY

## 2021-11-12 PROCEDURE — 94761 N-INVAS EAR/PLS OXIMETRY MLT: CPT

## 2021-11-12 PROCEDURE — 6370000000 HC RX 637 (ALT 250 FOR IP): Performed by: PHYSICAL MEDICINE & REHABILITATION

## 2021-11-12 PROCEDURE — 97530 THERAPEUTIC ACTIVITIES: CPT

## 2021-11-12 PROCEDURE — 1280000000 HC REHAB R&B

## 2021-11-12 PROCEDURE — 2580000003 HC RX 258: Performed by: PHYSICAL MEDICINE & REHABILITATION

## 2021-11-12 PROCEDURE — 97535 SELF CARE MNGMENT TRAINING: CPT

## 2021-11-12 PROCEDURE — 6370000000 HC RX 637 (ALT 250 FOR IP): Performed by: STUDENT IN AN ORGANIZED HEALTH CARE EDUCATION/TRAINING PROGRAM

## 2021-11-12 PROCEDURE — 97116 GAIT TRAINING THERAPY: CPT

## 2021-11-12 RX ORDER — ONDANSETRON 4 MG/1
4 TABLET, FILM COATED ORAL EVERY 8 HOURS PRN
Status: CANCELLED | OUTPATIENT
Start: 2021-11-12

## 2021-11-12 RX ORDER — DULOXETIN HYDROCHLORIDE 60 MG/1
60 CAPSULE, DELAYED RELEASE ORAL DAILY
Status: DISCONTINUED | OUTPATIENT
Start: 2021-11-13 | End: 2021-11-25 | Stop reason: HOSPADM

## 2021-11-12 RX ORDER — MAGNESIUM HYDROXIDE/ALUMINUM HYDROXICE/SIMETHICONE 120; 1200; 1200 MG/30ML; MG/30ML; MG/30ML
15 SUSPENSION ORAL EVERY 6 HOURS PRN
Qty: 1 EACH | Refills: 0 | Status: SHIPPED | OUTPATIENT
Start: 2021-11-12

## 2021-11-12 RX ORDER — OXYCODONE HYDROCHLORIDE 5 MG/1
5 TABLET ORAL EVERY 4 HOURS PRN
Status: DISCONTINUED | OUTPATIENT
Start: 2021-11-12 | End: 2021-11-25 | Stop reason: HOSPADM

## 2021-11-12 RX ORDER — CITALOPRAM 20 MG/1
20 TABLET ORAL DAILY
Status: CANCELLED | OUTPATIENT
Start: 2021-11-12

## 2021-11-12 RX ORDER — FENOFIBRATE 160 MG/1
160 TABLET ORAL DAILY
Status: DISCONTINUED | OUTPATIENT
Start: 2021-11-13 | End: 2021-11-25 | Stop reason: HOSPADM

## 2021-11-12 RX ORDER — METHOCARBAMOL 750 MG/1
750 TABLET, FILM COATED ORAL 3 TIMES DAILY
Status: CANCELLED | OUTPATIENT
Start: 2021-11-12

## 2021-11-12 RX ORDER — METHOCARBAMOL 750 MG/1
750 TABLET, FILM COATED ORAL 3 TIMES DAILY
Status: DISCONTINUED | OUTPATIENT
Start: 2021-11-12 | End: 2021-11-25 | Stop reason: HOSPADM

## 2021-11-12 RX ORDER — OXYCODONE HYDROCHLORIDE 5 MG/1
5 TABLET ORAL EVERY 4 HOURS PRN
Qty: 18 TABLET | Refills: 0 | Status: ON HOLD | OUTPATIENT
Start: 2021-11-12 | End: 2021-11-24 | Stop reason: HOSPADM

## 2021-11-12 RX ORDER — METHOCARBAMOL 750 MG/1
750 TABLET, FILM COATED ORAL 3 TIMES DAILY
Qty: 15 TABLET | Refills: 0 | Status: ON HOLD | OUTPATIENT
Start: 2021-11-12 | End: 2021-11-24

## 2021-11-12 RX ORDER — RISPERIDONE 1 MG/1
1 TABLET, FILM COATED ORAL 2 TIMES DAILY
Status: DISCONTINUED | OUTPATIENT
Start: 2021-11-12 | End: 2021-11-25 | Stop reason: HOSPADM

## 2021-11-12 RX ORDER — ACETAMINOPHEN 325 MG/1
650 TABLET ORAL EVERY 4 HOURS PRN
Status: CANCELLED | OUTPATIENT
Start: 2021-11-12

## 2021-11-12 RX ORDER — ONDANSETRON 4 MG/1
4 TABLET, FILM COATED ORAL EVERY 8 HOURS PRN
Status: DISCONTINUED | OUTPATIENT
Start: 2021-11-12 | End: 2021-11-25 | Stop reason: HOSPADM

## 2021-11-12 RX ORDER — POLYETHYLENE GLYCOL 3350 17 G/17G
17 POWDER, FOR SOLUTION ORAL DAILY
Status: DISCONTINUED | OUTPATIENT
Start: 2021-11-13 | End: 2021-11-25 | Stop reason: HOSPADM

## 2021-11-12 RX ORDER — ACETAMINOPHEN 325 MG/1
650 TABLET ORAL EVERY 4 HOURS PRN
Status: DISCONTINUED | OUTPATIENT
Start: 2021-11-12 | End: 2021-11-12 | Stop reason: SDUPTHER

## 2021-11-12 RX ORDER — SODIUM CHLORIDE 0.9 % (FLUSH) 0.9 %
5-40 SYRINGE (ML) INJECTION EVERY 12 HOURS SCHEDULED
Status: CANCELLED | OUTPATIENT
Start: 2021-11-12

## 2021-11-12 RX ORDER — OXYCODONE HYDROCHLORIDE 10 MG/1
10 TABLET ORAL EVERY 4 HOURS PRN
Status: CANCELLED | OUTPATIENT
Start: 2021-11-12

## 2021-11-12 RX ORDER — MAGNESIUM HYDROXIDE/ALUMINUM HYDROXICE/SIMETHICONE 120; 1200; 1200 MG/30ML; MG/30ML; MG/30ML
15 SUSPENSION ORAL EVERY 6 HOURS PRN
Status: DISCONTINUED | OUTPATIENT
Start: 2021-11-12 | End: 2021-11-25 | Stop reason: HOSPADM

## 2021-11-12 RX ORDER — RISPERIDONE 1 MG/1
1 TABLET, FILM COATED ORAL 2 TIMES DAILY
Status: CANCELLED | OUTPATIENT
Start: 2021-11-12

## 2021-11-12 RX ORDER — POLYETHYLENE GLYCOL 3350 17 G/17G
17 POWDER, FOR SOLUTION ORAL DAILY
Status: DISCONTINUED | OUTPATIENT
Start: 2021-11-12 | End: 2021-11-12 | Stop reason: HOSPADM

## 2021-11-12 RX ORDER — OXYBUTYNIN CHLORIDE 5 MG/1
10 TABLET ORAL NIGHTLY
Status: CANCELLED | OUTPATIENT
Start: 2021-11-12

## 2021-11-12 RX ORDER — OXYBUTYNIN CHLORIDE 5 MG/1
10 TABLET ORAL NIGHTLY
Status: DISCONTINUED | OUTPATIENT
Start: 2021-11-12 | End: 2021-11-25 | Stop reason: HOSPADM

## 2021-11-12 RX ORDER — MAGNESIUM HYDROXIDE/ALUMINUM HYDROXICE/SIMETHICONE 120; 1200; 1200 MG/30ML; MG/30ML; MG/30ML
15 SUSPENSION ORAL EVERY 6 HOURS PRN
Status: CANCELLED | OUTPATIENT
Start: 2021-11-12

## 2021-11-12 RX ORDER — CITALOPRAM 20 MG/1
20 TABLET ORAL DAILY
Status: DISCONTINUED | OUTPATIENT
Start: 2021-11-13 | End: 2021-11-25 | Stop reason: HOSPADM

## 2021-11-12 RX ORDER — ACETAMINOPHEN 325 MG/1
650 TABLET ORAL EVERY 4 HOURS PRN
Status: DISCONTINUED | OUTPATIENT
Start: 2021-11-12 | End: 2021-11-25 | Stop reason: HOSPADM

## 2021-11-12 RX ORDER — SODIUM CHLORIDE 0.9 % (FLUSH) 0.9 %
5-40 SYRINGE (ML) INJECTION EVERY 12 HOURS SCHEDULED
Status: DISCONTINUED | OUTPATIENT
Start: 2021-11-12 | End: 2021-11-18 | Stop reason: ALTCHOICE

## 2021-11-12 RX ORDER — OXYCODONE HYDROCHLORIDE 10 MG/1
10 TABLET ORAL EVERY 4 HOURS PRN
Status: DISCONTINUED | OUTPATIENT
Start: 2021-11-12 | End: 2021-11-25 | Stop reason: HOSPADM

## 2021-11-12 RX ORDER — SENNA AND DOCUSATE SODIUM 50; 8.6 MG/1; MG/1
2 TABLET, FILM COATED ORAL 2 TIMES DAILY
Status: CANCELLED | OUTPATIENT
Start: 2021-11-12

## 2021-11-12 RX ORDER — SENNA AND DOCUSATE SODIUM 50; 8.6 MG/1; MG/1
2 TABLET, FILM COATED ORAL 2 TIMES DAILY
Status: DISCONTINUED | OUTPATIENT
Start: 2021-11-12 | End: 2021-11-25 | Stop reason: HOSPADM

## 2021-11-12 RX ORDER — OXYCODONE HYDROCHLORIDE 5 MG/1
5 TABLET ORAL EVERY 4 HOURS PRN
Status: CANCELLED | OUTPATIENT
Start: 2021-11-12

## 2021-11-12 RX ORDER — FENOFIBRATE 160 MG/1
160 TABLET ORAL DAILY
Status: CANCELLED | OUTPATIENT
Start: 2021-11-12

## 2021-11-12 RX ORDER — BISACODYL 10 MG
10 SUPPOSITORY, RECTAL RECTAL DAILY PRN
Status: DISCONTINUED | OUTPATIENT
Start: 2021-11-12 | End: 2021-11-25 | Stop reason: HOSPADM

## 2021-11-12 RX ORDER — DULOXETIN HYDROCHLORIDE 60 MG/1
60 CAPSULE, DELAYED RELEASE ORAL DAILY
Status: CANCELLED | OUTPATIENT
Start: 2021-11-12

## 2021-11-12 RX ORDER — BISACODYL 10 MG
10 SUPPOSITORY, RECTAL RECTAL DAILY PRN
Status: CANCELLED | OUTPATIENT
Start: 2021-11-12

## 2021-11-12 RX ORDER — POLYETHYLENE GLYCOL 3350 17 G/17G
17 POWDER, FOR SOLUTION ORAL DAILY
Status: CANCELLED | OUTPATIENT
Start: 2021-11-13

## 2021-11-12 RX ORDER — DOCUSATE SODIUM 100 MG/1
100 CAPSULE, LIQUID FILLED ORAL 2 TIMES DAILY PRN
Status: DISCONTINUED | OUTPATIENT
Start: 2021-11-12 | End: 2021-11-12 | Stop reason: HOSPADM

## 2021-11-12 RX ORDER — SODIUM CHLORIDE 0.9 % (FLUSH) 0.9 %
5-40 SYRINGE (ML) INJECTION PRN
Status: DISCONTINUED | OUTPATIENT
Start: 2021-11-12 | End: 2021-11-18 | Stop reason: ALTCHOICE

## 2021-11-12 RX ORDER — SODIUM CHLORIDE 0.9 % (FLUSH) 0.9 %
5-40 SYRINGE (ML) INJECTION PRN
Status: CANCELLED | OUTPATIENT
Start: 2021-11-12

## 2021-11-12 RX ADMIN — SENNOSIDES AND DOCUSATE SODIUM 2 TABLET: 50; 8.6 TABLET ORAL at 20:59

## 2021-11-12 RX ADMIN — SODIUM CHLORIDE, PRESERVATIVE FREE 5 ML: 5 INJECTION INTRAVENOUS at 21:00

## 2021-11-12 RX ADMIN — OXYBUTYNIN CHLORIDE 10 MG: 5 TABLET ORAL at 20:59

## 2021-11-12 RX ADMIN — METHOCARBAMOL TABLETS 750 MG: 750 TABLET, COATED ORAL at 10:10

## 2021-11-12 RX ADMIN — POLYETHYLENE GLYCOL 3350 17 G: 17 POWDER, FOR SOLUTION ORAL at 13:08

## 2021-11-12 RX ADMIN — OXYCODONE 5 MG: 5 TABLET ORAL at 10:11

## 2021-11-12 RX ADMIN — FENOFIBRATE 160 MG: 160 TABLET ORAL at 10:11

## 2021-11-12 RX ADMIN — METHOCARBAMOL TABLETS 750 MG: 750 TABLET, COATED ORAL at 14:33

## 2021-11-12 RX ADMIN — RISPERIDONE 1 MG: 1 TABLET ORAL at 20:59

## 2021-11-12 RX ADMIN — METHOCARBAMOL TABLETS 750 MG: 750 TABLET, COATED ORAL at 20:59

## 2021-11-12 ASSESSMENT — PAIN SCALES - WONG BAKER
WONGBAKER_NUMERICALRESPONSE: 0
WONGBAKER_NUMERICALRESPONSE: 0

## 2021-11-12 ASSESSMENT — PAIN SCALES - GENERAL
PAINLEVEL_OUTOF10: 0
PAINLEVEL_OUTOF10: 0
PAINLEVEL_OUTOF10: 4
PAINLEVEL_OUTOF10: 0
PAINLEVEL_OUTOF10: 0

## 2021-11-12 NOTE — PROGRESS NOTES
Pt rounded on this morning Q2h, whiteboard updated, pt given ice water and needs assessed. Pt up to bathroom, x1 assist with stedy, and returning to bed, pt rating pain 4/10, given PRN analgesic (see eMAR). Pt anterior neck incision C/D/I, EDDY with steri strips intact. Pt has no further needs or concerns at this time. Call light within reach, pt verbalized understanding to call prior to ambulating. Will continue to monitor and reassess.    Electronically signed by Lukas Robertson RN on 11/12/2021 at 1:14 AM

## 2021-11-12 NOTE — PROGRESS NOTES
Occupational Therapy  Facility/Department: [address] ORTHOPEDICS  Daily Treatment Note  NAME: Rob Yancey  : 1950  MRN: [id number]    Date of Service: 2021    Discharge Recommendations:  5-7 sessions per week  OT Equipment Recommendations  Other: defer to [address] scored a 14/24 on the AM-PAC ADL Inpatient form. Current research shows that an AM-PAC score of 17 or less is typically not associated with a discharge to the patient's home setting. Based on the patient's AM-PAC score and their current ADL deficits, it is recommended that the patient have 5-7 sessions per week of Occupational Therapy at d/c to increase the patient's independence. At this time, this patient demonstrates the endurance, and/or tolerance for 3 hours of therapy each day, with a treatment frequency of 5-7x/wk. Please see assessment section for further patient specific details. If patient discharges prior to next session this note will serve as a discharge summary. Please see below for the latest assessment towards goals. Assessment   Performance deficits / Impairments: Decreased functional mobility ; Decreased strength; Decreased ADL status; Decreased safe awareness; Decreased endurance; Decreased balance; Decreased high-level IADLs  Assessment: Pt demonstrated increased funcitonal mobility in room, no LOB demonstrated however pt required increased time for funcitonal mobiltiy and ADLs. Pt tolerated grooming routine however required verbal cueing to sequence, and completed while seated in chair at sink side 2/2 reported fatigue with standing. Pt would benefit from continued OT while in acute care, AMPAC score indicates non homebound discharge. Pt would benefit from intensive IP OT prior to dc home as pt has limited support during the day. Continue OT POC. OT Education: Plan of Care; OT Role; Transfer Training; ADL Adaptive Strategies; Precautions;  Energy Conservation  Patient Education: cont to ed for reinforcement  REQUIRES OT FOLLOW UP: Yes  Activity Tolerance  Activity Tolerance: Patient limited by fatigue  Activity Tolerance: Pt demonstrated min fatigue after bathroom tasks/ mobility in room. Safety Devices  Safety Devices in place: Yes  Type of devices: Call light within reach; Nurse notified; Left in chair; All fall risk precautions in place; Chair alarm in place         Patient Diagnosis(es): The primary encounter diagnosis was Weakness of both lower extremities. A diagnosis of Unable to ambulate was also pertinent to this visit. has a past medical history of Depression, Hyperlipidemia, and Hypertension. has a past surgical history that includes cervical fusion (N/A, 11/10/2021). Restrictions  Restrictions/Precautions  Restrictions/Precautions: Fall Risk  Position Activity Restriction  Other position/activity restrictions: ACDF precautions  Subjective   General  Chart Reviewed: Yes  Patient assessed for rehabilitation services?: Yes  Additional Pertinent Hx: 74 y/o female admitted 11/7/2021 with progressive generalized weakness . Progression of cerebral palsy, balance issues. ; Now s/p cervical fusion  Family / Caregiver Present: No  Referring Practitioner: Dr. Margaux Orona  Diagnosis: ANTERIOR CERVICAL DISCECTOMY WITH FUSION, WITH INTERBODY IMPLANT AND WITH PLATE AND SCREW FIXATION C4-5 (N/A Spine Cervical)  Subjective  Subjective: Pt seated in bedside chair, agreeable to OT session for mobility and ADLs. General Comment  Comments: Per RN ok for therapy. Orientation  Orientation  Overall Orientation Status: Within Functional Limits  Objective    ADL  Feeding: Beverage management; Setup  Grooming: Minimal assistance (seated at sink for oral care, min cueing to sequence)  UE Dressing: Minimal assistance (donning/ doffing gown)  Toileting:  Moderate assistance (assist for pants mgmt)        Balance  Sitting Balance: Contact guard assistance  Standing Balance: Minimal assistance  Standing

## 2021-11-12 NOTE — PROGRESS NOTES
[address] Day: 6  POD #2  Diagnosis: Cervical disc herniation with myelopathy, hx of cerebral palsy  Operation:   1. Urgent C4-5 anterior discectomy. 2.  C4-5 anterior cervical arthrodesis. 3.  Anterior cervical fusion C4-5 using Jackson Trinica plate and screws. /69   Pulse 79   Temp 98.3 °F (36.8 °C) (Oral)   Resp 18   Ht 5' (1.524 m)   Wt 162 lb 4.1 oz (73.6 kg)   SpO2 98%   BMI 31.69 kg/m²     Patient admitted with history of cerebral palsy but with ability to walk with a walker. She had recent decline in function and strength with  progressive weakness and inability to pull herself to standing position. History of recent UTI. Imaging uncovered a large central and leftward disc hernation at C4-5. She has noted some improvement in strength in standing since surgery, has even walked to the bathroom with rolling walker with therapy. Has been seen by acute rehab and awaiting approval for acceptance to acute rehab. Eating and urinating without difficulty. Pre-operative weakness is improved. Complaining of posterior neck pain. Pain well controlled on current medications. 2/10  Moves all extremities, R>L. Limited proximal ROM in the bilateral upper extremities. stiffness in the hands, L>R, but is able to feed herself using both hands. Contracture and spasticity in LLE. Sensation intact in all dermatomes bilateral upper and lower extremities. Steri strips clean, dry and intact. Labs noted. Med list and MAR reviewed. Continue PT/OT. Await placement in acute rehab pending approval and bed availability.       Corrine Aguirre RN

## 2021-11-12 NOTE — PROGRESS NOTES
Patient transferred to  rehab.     Electronically signed by Lissette Feliz RN on 11/12/2021 at 4:44 PM

## 2021-11-12 NOTE — PROGRESS NOTES
Physical Therapy    Facility/Department: [facility]  Treatment note    NAME: Patel Bradford  : 1950  MRN: [id number]    Date of Service: 2021    Assessment / Discharge Recommendations:  -engaging well for PT OT sessions  -anticipate will tolerate and benefit from a high frequency of sessions PT OT for goal to return to home  Patel Bradford scored a 13/24 on the AM-PAC short mobility form. Current research shows that an AM-PAC score of 17 or less is typically not associated with a discharge to the patient's home setting. Based on the patient's AM-PAC score and their current functional mobility deficits, it is recommended that the patient have 5-7 sessions per week of Physical Therapy at d/c to increase the patient's independence. Activity Tolerance  Activity Tolerance: Patient Tolerated treatment well; Patient limited by endurance       Patient Diagnosis(es): The primary encounter diagnosis was Weakness of both lower extremities. A diagnosis of Unable to ambulate was also pertinent to this visit. has a past medical history of Depression, Hyperlipidemia, and Hypertension. has a past surgical history that includes cervical fusion (N/A, 11/10/2021). Restrictions  Restrictions/Precautions  Restrictions/Precautions: Fall Risk  Position Activity Restriction  Other position/activity restrictions: ACDF precautions  Vision/Hearing  Vision: Impaired  Vision Exceptions: Wears glasses at all times  Hearing: Exceptions to [facility]  Hearing Exceptions: Hard of hearing/hearing concerns; Bilateral hearing aid     Subjective  General  Chart Reviewed: Yes  Patient assessed for rehabilitation services?: Yes  Additional Pertinent Hx: Pt is a 73 y.o. female with hx of CP who presented to the ED on 21 with progressive generalized weakness and debility. Response To Previous Treatment: Patient with no complaints from previous session.   Family / Caregiver Present: Yes  Follows Commands: Within Functional Limits  Subjective  Subjective: arrived to room along with OT - patient sitting up in recliner (transferred via [facility] stedy) - alert oriented and agreeable to PT OT session-  indicates only pain is the anterior surgical area with swallowing  Pain Screening  Patient Currently in Pain: Yes  Orientation  Orientation  Overall Orientation Status: Within Functional Limits  Social/Functional History  Social/Functional History  Lives With:  (sister and NICK)  Type of Home: House  Home Layout: Two level, Bed/Bath upstairs, 1/2 bath on main level (2 + basement)  Home Access: Stairs to enter without rails  Entrance Stairs - Number of Steps: 1 thru garage + flight upstairs to bedroom with one rail  Bathroom Shower/Tub: Walk-in shower, Shower chair with back (showers in basement)  [employer]: Standard (uses towel bar to stand sometimes)  Bathroom Equipment: Shower chair  Bathroom Accessibility: Accessible  Home Equipment: Rolling walker, [employer]  ADL Assistance: Independent  Homemaking Assistance:  (microwave meals, family assist with heaving cleaning and laundry)  Ambulation Assistance: Independent (RW in home, w/c in community (sister pushes w/c))  Transfer Assistance: Independent  Additional Comments: Sister works during the day and pt home alone.  Pt denies falls  Objective  Bed mobility  Comment: not observed at this session  Transfers  Sit to Stand: Contact guard assistance (min assist up from the toilet)  Stand to sit: Contact guard assistance  Ambulation  Ambulation?: Yes  Ambulation 1  Surface: level tile  Device: Rolling Walker  Assistance: Minimal assistance; Contact guard assistance  Quality of Gait: step to pattern leading with right LE - left foot inverted - small step length and narrow to adequate base of support  Distance: 20 feet x2 with stance time at the sink to brush her teeth  Balance  Comments: midline in sitting at the edge of the recliner with close SBA for safety    - midline in static stance on the walker with cga for safety   -dynamic on rolling walker with hold to gait belt is fair  Plan   Plan  Times per week: 3-5x/week  Current Treatment Recommendations: Strengthening, ADL/Self-care Training, ROM, Functional Mobility Training, Transfer Training, Gait Training, Patient/Caregiver Education & Training, Safety Education & Training, Positioning  Safety Devices  Type of devices:  All fall risk precautions in place, Call light within reach, Chair alarm in place, Left in chair, Nurse notified  AM-PAC Score  AM-PAC Inpatient Mobility Raw Score : 13 (11/11/21 1420)  AM-PAC Inpatient T-Scale Score : 36.74 (11/11/21 1420)  Mobility Inpatient CMS 0-100% Score: 64.91 (11/11/21 1420)  Mobility Inpatient CMS G-Code Modifier : CL (11/11/21 1420)    Goals  Short term goals  Time Frame for Short term goals: 1-2 days  Short term goal 1[place] [facility] [place]  Short term goal 2: transfers at [facility]/min assist  Short term goal 3: ambulation at [facility] rolling walker for 30-40 feet  Patient Goals   Patient goals : wants to get home again       Therapy Time   Individual Concurrent Group Co-treatment   Time In 1050         Time Out 1130         Minutes 111 Ollie Herring, PT

## 2021-11-12 NOTE — PROGRESS NOTES
Patient admitted to room 3262 per chair. Patient was oriented to the Call Light, Phone, TV, Thermostat, Bed Controls, Bathroom and Emergency Cord. Patient verbalized and demonstrated understanding of all. Patient was also given an over view of Unit Routines for Acute Rehab, including what to wear for therapy. The patient's role in goal setting was reviewed along with an explanation of the Interdisciplinary Team meeting, the 's role in coordinating services and the Discharge Planning/Continuum of Care process. Patient Rights and Responsibilities were reviewed. Meal times were explained, including how to order food. The white board (used for communication) was pointed out emphasizing  the 3 hours/day Therapy Schedule (posted most evenings), the number (and process) for reporting grievances, and the Doctor's, Nurse's, and PCA's names. It was recommended that any family that will be care givers or any care givers the patient has, take part in therapy. There are no set visiting hours, and it was suggested that non-caregiver friends and family visitors come after therapy (at 4 PM or later) to allow patient to rest in between sessions.

## 2021-11-12 NOTE — OP NOTE
[facility]           [address]                                OPERATIVE REPORT    PATIENT NAME: Gregg Maddox                     :        1950  MED REC NO:   [id number]                          ROOM:       9582  ACCOUNT NO:   [de-identified]                           ADMIT DATE: 2021  PROVIDER:     Hipolito Mendez MD      DATE OF PROCEDURE:  11/10/2021    PREOPERATIVE DIAGNOSES:  Cervical disc herniation with myelopathy. POSTOPERATIVE DIAGNOSES:  Cervical disc herniation with myelopathy. PROCEDURE PERFORMED:  1. Urgent C4-5 anterior discectomy. 2.  C4-5 anterior cervical arthrodesis. 3.  Anterior cervical fusion C4-5 using Jackson Trinica plate and screws. 4.  Use of microscope. 5.  Physician-directed and interpreted intraoperative fluoroscopy. SURGEON:  Hipolito Mendez MD    ANESTHESIA:  General endotracheal.    COMPLICATIONS:  None apparent. ESTIMATED BLOOD LOSS:  5 mL. INDICATIONS:  The patient is a 53-year-old who was admitted to the  hospital for generalized weakness. She has cerebral palsy and has been  ambulating with a walker for 20 years. She had lost the ability to pull  herself to stand. Imaging demonstrated an enormous C4-5 disc herniation  with cord compression. This was leftward with osteophytes and she was  worked up and set up for decompression as soon as possible. PROCEDURE:  After obtaining informed consent, she was taken to the  operating suite on 11/10/2021. General endotracheal intubation was  initiated. She was positioned supine with care taken to pad pressure  points. Antibiotics were administered. Fluoroscopy was brought in and  the C4-5 level was localized with fluoroscopy and a right anterior neck  incision was planned. She was prepped and draped in typical surgical  fashion. Skin was infiltrated with Marcaine and epinephrine.   Scalpel  was used to incise the skin. Blunt and sharp dissection was used to  expose the platysma. Platysma was elevated and divided with a Bovie. An avascular plane was developed medial to carotid and  sternocleidomastoid to anterior prevertebral fascia. Handheld Cloward  retractors were placed and Rodney Maria was used to dissect away the  prevertebral fascia to expose the anterior cervical spine. Level was  confirmed intraoperatively with spinal needle and fluoroscopy. Longus  colli was dissected off the lateral edges of C4 and C5 bilaterally with  Bovie. Handheld drill under fluoroscopy was used to drill midline   holes and [place] pins were placed bilaterally in C4 and C5. Self-retaining retractors were placed and the microscope was delivered. Under the microscope, a partial corpectomy of C6 was performed with  drill and partial corpectomy of C5 was performed with a drill. Anterior  discectomy was performed with a drill and pituitary and then posterior  resection of the posterior longitudinal ligament was performed off to  the right side with #1 and #2 Kerrisons. There was exposure of the  thecal sac on the right side and a large amount of disc material into  the canal and leftward, #1 Kerrisons were used to resect the osteophytes  off of C6 and a nerve hook was placed and a plane was developed between  the dura and the large bulging disc material.  The nerve hook was used  to elevate the material up into the surgical field and there was free  fragment that was delivered with a micropituitary. Following this, the  remainder of the posterior longitudinal ligament and disc material was  removed with #1 and #2 Kerrisons. This produced decompression of the  canal.  The left foramen was decompressed. The right foramen was  decompressed with the Kerrisons.   Following decompression, sizers were  used and a 6-mm corticocancellous bone graft was prepared and tamped  into the C4-5 space under fluoroscopy and under the microscope  completing arthrodesis at this level. [place] pins were removed. Bone  wax was used for bone bleeding and one-level Jackson Trinica plate was  selected and using single barrel guide under fluoroscopy, hand drill was  used to drill  holes bilaterally in C4 and C5.  12-mm fixed angle  screws were advanced through the plate into the bone bilaterally. The  locking mechanism of the plate was engaged and anterior instrumentation  and fusion was completed. Final fluoroscopic images were obtained. The  retractors were removed. Meticulous hemostasis was achieved with  electrocautery. The incision was closed in layers and the patient was  extubated and transferred to recovery in stable condition moving  extremities at her preoperative baseline. There were no apparent  complications. All counts were correct. A time-out procedure was  performed prior to incision. I was present for the entire procedure.         Ana Selby MD    D: 11/11/2021 11:13:27       T: 11/11/2021 11:16:02     [id number]  Job#: [id number]     Doc#: [id number]    CC:

## 2021-11-12 NOTE — PROGRESS NOTES
[facility]  11/12/2021  1:39 PM    Patient Name:   Andriy Ramsey  YOB: 1950    Diagnosis: Cervical spinal cord compression with decompression surgery, 11/10      Subjective: Rehab consult follow-up. Chart reviewed. Patient discussed with our rehab unit admission nurse. Patient tolerating therapies better, but needs more therapy before discharge to home safely. Patient has been cleared medically for admission to our rehabilitation today. Patient agrees to rehab unit treatment. Patient has regular Medicare insurance. /69   Pulse 79   Temp 98.3 °F (36.8 °C) (Oral)   Resp 18   Ht 5' (1.524 m)   Wt 162 lb 4.1 oz (73.6 kg)   SpO2 98%   BMI 31.69 kg/m²     Last 24 hour lab  No results found for this or any previous visit (from the past 24 hour(s)). Plan: We will plan to admit to our rehab unit today.       Dr. Chris Giron

## 2021-11-12 NOTE — DISCHARGE SUMMARY
Hospital Discharge Summary    Patient's PCP: Clem Vergara MD  Admit Date: 11/7/2021   Discharge Date: 11/12/2021    Admitting Physician: Dr. Eddie Parra DO  Discharge Physician: Dr. Vaishali Epperson MD   Consults: neurology and neurosurgery and PMR    HPI: 74 yo F presented with generalized weakness for the past week, states cannot get up to walk anymore with her walker, was recommended per PCP for inpatient therapy. Brief hospital course:  Patient was admitted and evaluated and found to have a very large C4-5 disc herniation and cervical cord compression. Given her symptoms of new onset weakness, decision was made to take pt to OR for cervical diskectomy fusion with decompression and stabilization. Pt had an uneventful surgery and recovered rather well. PMR was consulted for placement to ARU, which was granted. Pt was discharged in stable condition. On the date of discharge, the patient reported feeling stable. The patient was found to not be in any acute distress, with vital signs within normal limits, and no new abnormalities on physical examination. Further, the patient expressed appropriate understanding of, and agreement with, the discharge recommendations, medications, and plan. Discharge Diagnoses:   Patient Active Problem List   Diagnosis    Generalized weakness       Physical Exam:   /69   Pulse 79   Temp 98.3 °F (36.8 °C) (Oral)   Resp 18   Ht 5' (1.524 m)   Wt 162 lb 4.1 oz (73.6 kg)   SpO2 98%   BMI 31.69 kg/m²     No results for input(s): POCGLU in the last 72 hours. General appearance: No apparent distress, appears stated age and cooperative. HEENT: Pupils equal, round, and reactive to light. Conjunctivae/corneas clear. Neck: Supple, limited ROM d/t recent surgery. No jugular venous distention. Trachea midline. Respiratory:  Normal respiratory effort. Clear to auscultation, bilaterally without Rales/Wheezes/Rhonchi.   Cardiovascular: Regular rate and rhythm with normal S1/S2 without murmurs, rubs or gallops. Abdomen: Soft, non-tender, non-distended with normal bowel sounds. Musculoskeletal: No clubbing, cyanosis or edema bilaterally. Full range of motion without deformity. Skin: Skin color, texture, turgor normal.  No rashes or lesions. Neurologic:  Neurovascularly intact without any focal sensory/motor deficits. Cranial nerves: II-XII intact, grossly non-focal.  Psychiatric: Alert and oriented, thought content appropriate    LABS:  No results for input(s): WBC, HGB, PLT in the last 72 hours. No results for input(s): NA, K, CL, CO2, BUN, CREATININE, GLUCOSE in the last 72 hours. No results for input(s): INR in the last 72 hours. Significant Diagnostic Studies:   FLUORO FOR SURGICAL PROCEDURES   Final Result      XR CERVICAL SPINE 1 VW   Final Result      MRI LUMBAR SPINE WO CONTRAST   Final Result   1. Multilevel lumbar spondylosis, most pronounced at L4-5 resulting in   moderate to severe canal stenosis. MRI THORACIC SPINE WO CONTRAST   Final Result   1. Mild spinal canal stenosis and mild bilateral neural foraminal narrowing   at T9-10 and T11-T12, as described above. 2. Mild spinal canal stenosis at T7-8, as described above. 3. Additional mild multilevel degenerative changes, as described above. MRI CERVICAL SPINE WO CONTRAST   Final Result   1. Severe canal stenosis with cord compression and rightward displacement of   the cervical cord at C4-5 related to large left subarticular disc protrusion   and osteophytic spurring with mild cord edema. 2. Mild right eccentric cord compression at C6-7 without abnormal underlying   cord signal.   Findings were discussed with Regina Magaña NP at 11:05 am on 11/9/2021. MRI BRAIN WO CONTRAST   Final Result   1. No acute cortical infarct or other acute intracranial process.    2. Moderate generalized parenchymal volume loss, chronic small vessel   ischemia and disproportionate enlargement of the supratentorial ventricles   with configuration suggesting central white matter volume loss. 3. Incidental, suspected tiny meningioma overlying the right temporal   convexity without mass effect on the underlying brain parenchyma or   underlying parenchymal signal abnormality. XR CHEST PORTABLE   Final Result   No airspace disease by radiograph. Treatments: surgery: cervical diskectomy fusion with decompression and stabilization    Discharge Medications:     Medication List      START taking these medications    aluminum & magnesium hydroxide-simethicone 200-200-20 MG/5ML Susp suspension  Commonly known as: MAALOX  Take 15 mLs by mouth every 6 hours as needed for Indigestion     methocarbamol 750 MG tablet  Commonly known as: ROBAXIN  Take 1 tablet by mouth 3 times daily for 5 days     oxyCODONE 5 MG immediate release tablet  Commonly known as: ROXICODONE  Take 1 tablet by mouth every 4 hours as needed for Pain for up to 3 days.         CONTINUE taking these medications    citalopram 20 MG tablet  Commonly known as: CELEXA     docusate sodium 100 MG capsule  Commonly known as: COLACE     DULoxetine 60 MG extended release capsule  Commonly known as: CYMBALTA     fenofibric acid 135 MG Cpdr capsule  Commonly known as: FIBRICOR     oxybutynin 5 MG tablet  Commonly known as: DITROPAN     risperiDONE 1 MG tablet  Commonly known as: RISPERDAL     therapeutic multivitamin-minerals tablet           Where to Get Your Medications      You can get these medications from any pharmacy    Bring a paper prescription for each of these medications  · aluminum & magnesium hydroxide-simethicone 200-200-20 MG/5ML Susp suspension  · methocarbamol 750 MG tablet  · oxyCODONE 5 MG immediate release tablet       Activity: activity as tolerated  Diet: regular diet  Wound Care: keep wound clean and dry    Disposition: ARU  Discharged Condition: Stable  Follow Up: Primary Care Physician in two weeks    Total time spent on discharge, finalizing medications, referrals and arranging outpatient follow up was more than 30 minutes    Thank you Dr. Chava Elizabeth MD for the opportunity to be involved in this patients care. If you have any questions or concerns please feel free to contact me via [address] [place] [address] Avenue.     Verner Robert, MD   Internal Medicine  11/12/2021 3:32 PM  Reach via [employer]

## 2021-11-12 NOTE — PLAN OF CARE
Problem: Falls - Risk of:  Goal: Will remain free from falls  Description: Will remain free from falls  11/12/2021 0332 by Malissa Chavez RN  Outcome: Ongoing   Fall risk assessment completed. Fall precautions in place. Call light within reach. Pt educated on calling for assistance before getting up. Walkway free of clutter. Will continue to monitor. Electronically signed by Malissa Chavez RN on 11/12/2021 at 3:33 AM    Problem: Falls - Risk of:  Goal: Absence of physical injury  Description: Absence of physical injury  11/12/2021 0332 by Malissa Chavez RN  Outcome: Ongoing   Pt is free of injury. No injury noted. Fall precautions in place. Call light within reach. Will monitor. Electronically signed by Malissa Chavez RN on 11/12/2021 at 3:33 AM    Problem: Skin Integrity:  Goal: Will show no infection signs and symptoms  Description: Will show no infection signs and symptoms  11/12/2021 0332 by Malissa Chavez RN  Outcome: Ongoing   Pt is free of signs and symptoms of infection. Incision and dressing are clean, dry and intact. Vital signs stable. Will monitor. Electronically signed by Malissa Chavez RN on 11/12/2021 at 3:33 AM    Problem: Skin Integrity:  Goal: Absence of new skin breakdown  Description: Absence of new skin breakdown  11/12/2021 0332 by Malissa Chavez RN  Outcome: Ongoing   Skin assessment completed. No skin breakdown noted. Preventative mepilex on sacrum. Pt reminded to turn and reposition frequently. Will continue to monitor and reassess. Electronically signed by Malissa Chavez RN on 11/12/2021 at 3:34 AM    Problem: SAFETY  Goal: Free from accidental physical injury  11/12/2021 0332 by Malissa Chavez RN  Outcome: Ongoing   Pt is free of injury. No injury noted. Fall precautions in place. Call light within reach. Will monitor.    Electronically signed by Malissa Chavez RN on 11/12/2021 at 3:34 AM    Problem: SAFETY  Goal: Free from intentional harm  11/12/2021 5022 by Dion Jules RN  Outcome: Ongoing   Pt is free of intentional harm. No intentions noted. Will monitor. Electronically signed by Dion Jules RN on 11/12/2021 at 3:34 AM    Problem: DAILY CARE  Goal: Daily care needs are met  11/12/2021 0332 by Dion Jules RN  Outcome: Ongoing   Daily care needs have been met by staff and patient. Will continue to monitor needs. Electronically signed by Dion Jules RN on 11/12/2021 at 3:34 AM    Problem: SKIN INTEGRITY  Goal: Skin integrity is maintained or improved  11/12/2021 0332 by Dion Jules RN  Outcome: Ongoing   Skin assessment completed. No skin breakdown noted. Pt reminded to turn and reposition frequently. Will continue to monitor and reassess. Electronically signed by Dion Jules RN on 11/12/2021 at 3:34 AM    Problem: DISCHARGE BARRIERS  Goal: Patient's continuum of care needs are met  11/12/2021 0332 by Dion Jules RN  Outcome: Ongoing   Pt at baseline mentality per family at bedside throughout the day. Pt states she feels improved mobility already. Plans to discharge to SNF for further therapy prior to returning home. SW following for d/c needs. Will continue to monitor and reassess. Electronically signed by Dion Jules RN on 11/12/2021 at 3:36 AM    Problem: Pain:  Goal: Pain level will decrease  Description: Pain level will decrease  11/12/2021 0332 by Dion Jules RN  Outcome: Ongoing   Pt assessed for pain. Pt in pain and assessed with 0-10 pain rating scale. Pt given prescribed analgesic for pain. (See eMar) Pt satisfied with pain relief thus far. Will reassess and continue to monitor.    Electronically signed by Dion Jules RN on 11/12/2021 at 3:36 AM

## 2021-11-12 NOTE — PROGRESS NOTES
Nutrition Assessment     Type and Reason for Visit: Initial    Nutrition Recommendations/Plan:   Regular diet   Will monitor nutritional adequacy, nutrition-related labs, weights, BMs, and clinical progress      Nutrition Assessment:  LOS. Pt admitted with generalized weakness. Pt with Cervical disc herniation with myelopathy, hx of cerebral palsy. S/p Urgent C4-5 anterior discectomy, C4-5 anterior cervical arthrodesis, Anterior cervical fusion C4-5. Regular diet, pt has been eating more than 50% of meals. Pt with surgical incision, otherwise skin intact. Malnutrition Assessment:  Malnutrition Status: No malnutrition    Nutrition Related Findings: Last BM 11/9      Current Nutrition Therapies:    ADULT DIET;  Regular    Anthropometric Measures:  · Height: 5' (152.4 cm)  · Current Body Wt: 162 lb (73.5 kg)   · BMI: 31.6    Nutrition Diagnosis:   No nutrition diagnosis at this time     Nutrition Interventions:   Food and/or Nutrient Delivery:  Continue Current Diet  Nutrition Education/Counseling:  No recommendation at this time   Coordination of Nutrition Care:  Continue to monitor while inpatient    Goals:  Consume greater than 50% of meals       Nutrition Monitoring and Evaluation:   Behavioral-Environmental Outcomes:  None Identified   Food/Nutrient Intake Outcomes:  Food and Nutrient Intake  Physical Signs/Symptoms Outcomes:  Biochemical Data, Nutrition Focused Physical Findings, Skin, Weight     Discharge Planning:    No discharge needs at this time     Electronically signed by Ricco French RD, LD on 11/12/21 at 12:11 PM EST    Contact: [phone]

## 2021-11-12 NOTE — CARE COORDINATION
Recd call that rehab is able to accept. Message MD who reports pt is able to dc today to rehab. Spoke with pt at bedside who reports she is excited and prefers rehab as 1st choice. Called sister to inform. Called [facility] to inform.   Electronically signed by [id number] GUI Brown on 11/12/2021 at 12:57 PM

## 2021-11-12 NOTE — PROGRESS NOTES
4 Eyes Skin Assessment     NAME:  Chung Valero  YOB: 1950  MEDICAL RECORD NUMBER:  [id number]    The patient is being assess for  Admission    I agree that 2 RN's have performed a thorough Head to Toe Skin Assessment on the patient. ALL assessment sites listed below have been assessed. Areas assessed by both nurses:    Head, Face, Ears, Shoulders, Back, Chest, Arms, Elbows, Hands, Sacrum. Buttock, Coccyx, Ischium and Legs. Feet and Heels        Does the Patient have a Wound?  No noted wound(s)       Nicholas Prevention initiated:  Yes   Wound Care Orders initiated:  NA    Pressure Injury (Stage 3,4, Unstageable, DTI, NWPT, and Complex wounds) if present place consult order under [de-identified] No    New and Established Ostomies if present place consult order under : NA      Nurse 1 eSignature: Electronically signed by Jewell Milan RN on 11/12/21 at 6:51 PM EST    **SHARE this note so that the co-signing nurse is able to place an eSignature**    Nurse 2 eSignature: Electronically signed by Jackson Chawla RN on 11/13/21 at 3:00 AM EST

## 2021-11-12 NOTE — PROGRESS NOTES
A complete drug regimen review was completed for this patient.      [x]  No clinically significant medication issue was identified    []   Yes, a clinically significant medication issue was identified     []  Adverse Drug Event:      []  Allergy:      []  Side Effect:      []  Ineffective Therapy:      []  Drug Interaction:     []  Duplicated Therapy:     []  Untreated Indication:      []  Non-adherence:     []  Other:          Electronically signed by José Antonio Owen RN on 11/12/21 at 5:29 PM EST

## 2021-11-13 LAB
ANION GAP SERPL CALCULATED.3IONS-SCNC: 11 MMOL/L (ref 3–16)
BUN BLDV-MCNC: 16 MG/DL (ref 7–20)
CALCIUM SERPL-MCNC: 9.5 MG/DL (ref 8.3–10.6)
CHLORIDE BLD-SCNC: 102 MMOL/L (ref 99–110)
CO2: 25 MMOL/L (ref 21–32)
CREAT SERPL-MCNC: 0.7 MG/DL (ref 0.6–1.2)
GFR AFRICAN AMERICAN: >60
GFR NON-AFRICAN AMERICAN: >60
GLUCOSE BLD-MCNC: 107 MG/DL (ref 70–99)
HCT VFR BLD CALC: 34.2 % (ref 36–48)
HEMOGLOBIN: 11.3 G/DL (ref 12–16)
MCH RBC QN AUTO: 30.1 PG (ref 26–34)
MCHC RBC AUTO-ENTMCNC: 33.2 G/DL (ref 31–36)
MCV RBC AUTO: 90.7 FL (ref 80–100)
PDW BLD-RTO: 13.8 % (ref 12.4–15.4)
PLATELET # BLD: 342 K/UL (ref 135–450)
PMV BLD AUTO: 7.8 FL (ref 5–10.5)
POTASSIUM REFLEX MAGNESIUM: 4.1 MMOL/L (ref 3.5–5.1)
RBC # BLD: 3.77 M/UL (ref 4–5.2)
SODIUM BLD-SCNC: 138 MMOL/L (ref 136–145)
WBC # BLD: 10.1 K/UL (ref 4–11)

## 2021-11-13 PROCEDURE — 97530 THERAPEUTIC ACTIVITIES: CPT

## 2021-11-13 PROCEDURE — 97535 SELF CARE MNGMENT TRAINING: CPT

## 2021-11-13 PROCEDURE — 1280000000 HC REHAB R&B

## 2021-11-13 PROCEDURE — 97530 THERAPEUTIC ACTIVITIES: CPT | Performed by: PHYSICAL THERAPIST

## 2021-11-13 PROCEDURE — 97166 OT EVAL MOD COMPLEX 45 MIN: CPT

## 2021-11-13 PROCEDURE — 2580000003 HC RX 258: Performed by: PHYSICAL MEDICINE & REHABILITATION

## 2021-11-13 PROCEDURE — 6370000000 HC RX 637 (ALT 250 FOR IP): Performed by: PHYSICAL MEDICINE & REHABILITATION

## 2021-11-13 PROCEDURE — 80048 BASIC METABOLIC PNL TOTAL CA: CPT

## 2021-11-13 PROCEDURE — 97162 PT EVAL MOD COMPLEX 30 MIN: CPT | Performed by: PHYSICAL THERAPIST

## 2021-11-13 PROCEDURE — 36415 COLL VENOUS BLD VENIPUNCTURE: CPT

## 2021-11-13 PROCEDURE — 6360000002 HC RX W HCPCS: Performed by: PHYSICAL MEDICINE & REHABILITATION

## 2021-11-13 PROCEDURE — 97116 GAIT TRAINING THERAPY: CPT | Performed by: PHYSICAL THERAPIST

## 2021-11-13 PROCEDURE — 85027 COMPLETE CBC AUTOMATED: CPT

## 2021-11-13 RX ADMIN — METHOCARBAMOL TABLETS 750 MG: 750 TABLET, COATED ORAL at 20:35

## 2021-11-13 RX ADMIN — SODIUM CHLORIDE, PRESERVATIVE FREE 10 ML: 5 INJECTION INTRAVENOUS at 09:35

## 2021-11-13 RX ADMIN — ENOXAPARIN SODIUM 40 MG: 100 INJECTION SUBCUTANEOUS at 09:34

## 2021-11-13 RX ADMIN — METHOCARBAMOL TABLETS 750 MG: 750 TABLET, COATED ORAL at 13:26

## 2021-11-13 RX ADMIN — METHOCARBAMOL TABLETS 750 MG: 750 TABLET, COATED ORAL at 09:33

## 2021-11-13 RX ADMIN — SENNOSIDES AND DOCUSATE SODIUM 2 TABLET: 50; 8.6 TABLET ORAL at 09:33

## 2021-11-13 RX ADMIN — CITALOPRAM HYDROBROMIDE 20 MG: 20 TABLET ORAL at 09:33

## 2021-11-13 RX ADMIN — DULOXETINE HYDROCHLORIDE 60 MG: 60 CAPSULE, DELAYED RELEASE ORAL at 09:33

## 2021-11-13 RX ADMIN — POLYETHYLENE GLYCOL 3350 17 G: 17 POWDER, FOR SOLUTION ORAL at 09:33

## 2021-11-13 RX ADMIN — SENNOSIDES AND DOCUSATE SODIUM 2 TABLET: 50; 8.6 TABLET ORAL at 20:35

## 2021-11-13 RX ADMIN — RISPERIDONE 1 MG: 1 TABLET ORAL at 09:33

## 2021-11-13 RX ADMIN — FENOFIBRATE 160 MG: 160 TABLET ORAL at 09:33

## 2021-11-13 RX ADMIN — RISPERIDONE 1 MG: 1 TABLET ORAL at 20:35

## 2021-11-13 RX ADMIN — OXYBUTYNIN CHLORIDE 10 MG: 5 TABLET ORAL at 20:35

## 2021-11-13 RX ADMIN — SODIUM CHLORIDE, PRESERVATIVE FREE 10 ML: 5 INJECTION INTRAVENOUS at 20:36

## 2021-11-13 ASSESSMENT — PAIN SCALES - GENERAL
PAINLEVEL_OUTOF10: 0

## 2021-11-13 NOTE — PROGRESS NOTES
Occupational Therapy   Occupational Therapy Initial Assessment  Date: 2021   Patient Name: Tita Green  MRN: [id number]     : 1950    Date of Service: 2021    Discharge Recommendations:  Home with assist PRN, Patient would benefit from continued therapy after discharge       Assessment   Performance deficits / Impairments: Decreased functional mobility ; Decreased strength; Decreased ADL status; Decreased safe awareness; Decreased endurance; Decreased balance; Decreased high-level IADLs  Assessment: Pt is a 74 yo F w/ PMHx of CP who initially presented w/ debility, inability to walk, generalized weakness. Work-up revealed severe cervical spinal cord compression at C4-5 level; pt underwent underwent anterior cervical discectomy with fusion and fixation of C4-5 per Dr. Gen Juarez on 11/10/21. PTA, pt lives w/ her sister and NICK in a house where she was completely independent in fxl mobility using RW, self-care, and able to complete simple microwave meal prep. Pt now presents to ARU below her baseline. Today the pt required CGA/min A for fxl transfers and mobility using RW (then w/c w/ fatigue). She required setup for seated grooming, min A UB bathing, max A LB bathing, min A UB dressing, max/total A LB dressing, max A toileting. She required increased time for all tasks, is limited by baseline UE deficits. She will benefit from continued therapy x1.5-2 weeks on ARU to increase her functional safety and independence prior to returning home w/ her sister. Treatment Diagnosis: decreased: ADLs, fxl transfers/mobility, IADLs  Prognosis: Good  Decision Making: Medium Complexity  History: 74 yo F w/ PMHx CP who lives in a 2 story home w/ her sister and NICK. House has 1 DAKOTA to enter and 1 flight of steps to pt's bedroom and full bathroom. Pt has been sleeping in a recliner chair on main level, which only has a half bath. She was previously independent, her sister works during the day.  Pt has RW, w/c, shower chair, and GBs  Exam: Bed mob, fxl transfers, fxl mobility, ADLs, ROM/MMT  Assistance / Modification: RW, w/c, CGA/min A fxl transfers/mobility, setup-max/total A ADLs  OT Education: Plan of Care; OT Role; Transfer Training; ADL Adaptive Strategies; Precautions; Energy Conservation  Barriers to Learning: hearing  REQUIRES OT FOLLOW UP: Yes  Activity Tolerance  Activity Tolerance: Patient limited by fatigue; Patient Tolerated treatment well  Safety Devices  Safety Devices in place: Yes  Type of devices: Call light within reach; Nurse notified; Left in chair; All fall risk precautions in place; Chair alarm in place           Patient Diagnosis(es): There were no encounter diagnoses. has a past medical history of Depression, Hyperlipidemia, and Hypertension. has a past surgical history that includes cervical fusion (N/A, 11/10/2021). Treatment Diagnosis: decreased: ADLs, fxl transfers/mobility, IADLs      Restrictions  Restrictions/Precautions  Restrictions/Precautions: Fall Risk  Position Activity Restriction  Other position/activity restrictions: ACDF precautions. Hx of CP    Subjective   General  Chart Reviewed: Yes  Patient assessed for rehabilitation services?: Yes  Additional Pertinent Hx: per H&P: \"73year-old female patient with cerebral palsy with recent history of increasing debility, inability to walk, generalized weakness. Patient states that she was not able to perform her ADLs, use her walker and her sister is not able to help her any longer as well. Work-up revealed the patient had severe cervical spinal cord compression due to large disc herniation and osteophytes at the C4-5 level. Patient was taken to surgery on 11/10 where she underwent anterior cervical discectomy with fusion and fixation of C4-5 per Dr. Woodruff Brochure. Patient started in therapies today. Patient lives at home with her sister in a two-level house. \"  Family / Caregiver Present: No  Referring Practitioner: Heis  Diagnosis: ANTERIOR CERVICAL DISCECTOMY WITH FUSION, WITH INTERBODY IMPLANT AND WITH PLATE AND SCREW FIXATION C4-5 (N/A Spine Cervical)  Subjective  Subjective: Pt met b/s for OT eval/tx. Social/Functional History  Social/Functional History  Lives With:  (sister and NICK)  Type of Home: House  Home Layout: Two level, Bed/Bath upstairs, 1/2 bath on main level (2 + basement. Pt has been sleeping in a lounge chair on the first floor)  Home Access: Stairs to enter without rails  Entrance Stairs - Number of Steps: 1 thru garage + flight upstairs to bedroom with one rail  Bathroom Shower/Tub: Walk-in shower, Shower chair with back (showers in basement)  Bathroom Toilet: Standard (uses towel bar to stand sometimes)  Bathroom Equipment: Shower chair  Bathroom Accessibility: Accessible  Home Equipment: Rolling walker, Karleen Boas  ADL Assistance: Independent  Homemaking Assistance:  (microwave meals, family assist with heaving cleaning and laundry)  Ambulation Assistance: Independent (RW in home, w/c in community (sister pushes w/c))  Transfer Assistance: Independent  Active : No  Patient's  Info:  drives  Leisure & Hobbies: Reading, word search puzzles  Additional Comments: Sister works during the day and pt home alone. Pt denies falls       Objective   Vision: Impaired  Vision Exceptions: Wears glasses at all times  Hearing: Exceptions to [facility]  Hearing Exceptions: Hard of hearing/hearing concerns; Bilateral hearing aid    Orientation  Overall Orientation Status: Within Functional Limits     Balance  Sitting Balance: Stand by assistance (SBA/CGA)  Standing Balance: Minimal assistance (CGA/min A)  Functional Mobility  Functional - Mobility Device: Rolling Walker  Activity: To/from bathroom  Assist Level: Minimal assistance  Functional Mobility Comments: Pt completed fxl mobility from bed > bathroom w/ CGA/min A using RW; used w/c out of bathroom d/t fatigue following shower.  Pt required cues/assist for navigation w/ RW, appears to squeeze eyes shut at times which she reports is normal for her  Toilet Transfers  Toilet - Technique: Ambulating (RW)  Equipment Used: Grab bars  Toilet Transfer: Contact guard assistance  Shower Transfers  Shower - Transfer From: Julio Pean - Transfer Type: To and From  Shower - Transfer To: Shower seat with back  Shower - Technique: Ambulating  Shower Transfers: Minimal assistance  Shower Transfers Comments: cues for sit and swing technique, min A to scoot back on chair  Wheelchair Bed Transfers  Wheelchair/Bed - Technique: Ambulating (RW)  Equipment Used: Wheelchair; Other; Bed (recliner)  Level of Asssistance: Minimal assistance; Contact guard assistance  Wheelchair Transfers Comments: Max cues hand placement, difficulty scooting back in chairs  ADL  Feeding: Setup; Increased time to complete (assist to cut up food and manage packets/containers)  Grooming: Setup; Stand by assistance; Increased time to complete (Pt completed oral care, brushed hair seated at sink w/ setup and extra time)  UE Bathing: Setup; Increased time to complete; Minimal assistance (Seated on shower chair, OT gently cleaned surgical site w/ dial soap)  LE Bathing: Maximum assistance; Setup; Increased time to complete (Pt bathed anterior periarea seated, required assist for all else)  UE Dressing: Minimal assistance; Setup; Increased time to complete (assist to pull shirt all the way down and adjust)  LE Dressing: Dependent/Total; Maximum assistance (max A to don briefs and pants, pt assists w/ pulling up over hips. total A for socks and TEDs)  Toileting: Maximum assistance; Dependent/Total (pt voided urine from commode twice during session, able to assist w/ briefs/pants up and down but required overall max/total A)  Additional Comments: Pt completed shower from shower chair w/ towel on floor for non-skid surface.  IV covered and kept dry  Tone RUE  RUE Tone: Normotonic  Tone LUE  LUE Tone: Hypertonic  Coordination  Movements Are Fluid And Coordinated: No  Coordination and Movement description: Left UE; Decreased accuracy; Gross motor impairments; Fine motor impairments; Decreased speed  Quality of Movement Other  Comment: hx of CP; increased tone noted resulting in fine motor impairments     Bed mobility  Supine to Sit: Minimal assistance  Scooting: Minimal assistance        Cognition  Overall Cognitive Status: Exceptions (hard of hearing)  Arousal/Alertness: Appropriate responses to stimuli  Following Commands: Follows one step commands with increased time;  Follows one step commands with repetition  Attention Span: Appears intact  Memory: Decreased short term memory  Problem Solving: Assistance required to generate solutions; Assistance required to implement solutions; Decreased awareness of errors  Initiation: Requires cues for some  Sequencing: Requires cues for some                 LUE AROM (degrees)  LUE AROM : WFL  Left Hand AROM (degrees)  Left Hand AROM: WFL  RUE AROM (degrees)  RUE AROM : WFL  Right Hand AROM (degrees)  Right Hand AROM: WFL  LUE Strength  LUE Strength Comment: hand grasp WFL, did not formally assess all d/t cervical surgery  RUE Strength  RUE Strength Comment: hand grasp WFL, did not formally assess all d/t cervical surgery           Plan   Plan  Times per week: 5-6  Times per day: Twice a day  Plan weeks: 1.5-2  Current Treatment Recommendations: Strengthening, Functional Mobility Training, Gait Training, Endurance Training, Balance Training, ROM, Self-Care / ADL, Patient/Caregiver Education & Training, Safety Education & Training      Goals  Short term goals  Time Frame for Short term goals: 1 week  Short term goal 1: Pt will complete ADL transfers w/ SBA  Short term goal 2: Pt will toilet w/ min A  Short term goal 3: Pt will feed and groom mod I  Short term goal 4: Pt will bathe w/ min A using AE prn  Short term goal 5: Pt will dress w/ min A using AE prn  Long term goals  Time Frame for Long term goals : 10-14 days  Long term goal 1: Pt will complete ADL transfers mod I  Long term goal 2: Pt will toilet mod I  Long term goal 3: Pt will bathe mod I  Long term goal 4: Pt will dress mod I (may need assist w/ TEDs)  Long term goal 5: Pt will complete microwave meal prep task mod I  Patient Goals   Patient goals : \"be able to get around, get my own shower, make a simple breakfast\"       Therapy Time   Individual Concurrent Group Co-treatment   Time In 0730         Time Out 0900         Minutes [address], OTR/L [id number]

## 2021-11-13 NOTE — PROGRESS NOTES
Physical Therapy    Facility/Department: [address] IP REHAB  Initial Assessment    NAME: Piero Dewitt  : 1950  MRN: [id number]    Date of Service: 2021    Discharge Recommendations:  Continue to assess pending progress, Patient would benefit from continued therapy after discharge   PT Equipment Recommendations  Equipment Needed: No    Assessment   Body structures, Functions, Activity limitations: Decreased functional mobility ; Decreased strength; Decreased safe awareness; Decreased endurance; Decreased balance; Decreased posture  Assessment: Pt is a 72 yo F w/ PMHx of CP who initially presented w/ debility, inability to walk, generalized weakness. Work-up revealed severe cervical spinal cord compression at C4-5 level; pt underwent underwent anterior cervical discectomy with fusion and fixation of C4-5 per Dr. Lacie Santoyo on 11/10/21. PTA, pt lives w/ her sister and NICK in a house where she was completely independent in fxl mobility using RW. Patient did report slept in recliner last several weaks downstairs due to weakness usually sleeps upstairs with flight of steps to ascend. . Patient below PLOF with currently requires mod/max assistance for bed mobility and min/CGA for transfers and ambulation short distances with wheeled walker. Patient limited by weakness,  limited ROM and postural impairments from CP, decreased endurance and balance. Patient will benefit from ARU to improve overal functional mobility to independent to return home with sister. Treatment Diagnosis: impaired mobility  Specific instructions for Next Treatment: progress gait  Prognosis: Fair  Decision Making: Medium Complexity  History: see below  Exam: see below  Clinical Presentation: evolving  PT Education: Goals; PT Role; Plan of Care; General Safety; Functional Mobility Training; Transfer Training; Gait Training; Precautions;  Adaptive Device Training; Energy Conservation  Patient Education: ACDF precautions- no bending lifting and but able to understand  Subjective  Subjective: Patient agreeable to eval and treatment  with no pain in sitting. patient reports slept well. Orientation  Orientation  Overall Orientation Status: Within Functional Limits (14/15)  Social/Functional History  Social/Functional History  Lives With:  (sister and NICK)  Type of Home: House  Home Layout: Two level, Bed/Bath upstairs, 1/2 bath on main level (2 + basement. Pt has been sleeping in a lounge chair on the first floor)  Home Access: Stairs to enter without rails  Entrance Stairs - Number of Steps: 1 thru garage + flight upstairs to bedroom with one rail  Bathroom Shower/Tub: Walk-in shower, Shower chair with back (showers in basement)  Bathroom Toilet: Standard (uses towel bar to stand sometimes)  Bathroom Equipment: Shower chair  Bathroom Accessibility: Accessible  Home Equipment: Rolling walker, Wheelchair-manual, 4 wheeled walker  ADL Assistance: Independent  Homemaking Assistance:  (microwave meals, family assist with heaving cleaning and laundry)  Ambulation Assistance: Independent (RW in home, w/c in community (sister pushes w/c))  Transfer Assistance: Independent  Active : No  Patient's  Info: Sister drives  Type of occupation: worked for [employer]  [address]: Reading, word search puzzles  Additional Comments: Sister works during the day and pt home alone. Pt denies falls  Cognition   Cognition  Overall Cognitive Status: Exceptions (hard of hearing)  Arousal/Alertness: Appropriate responses to stimuli  Following Commands: Follows one step commands with increased time;  Follows one step commands with repetition  Attention Span: Appears intact  Memory: Decreased short term memory  Problem Solving: Assistance required to generate solutions; Assistance required to implement solutions; Decreased awareness of errors  Initiation: Requires cues for some  Sequencing: Requires cues for some    Objective assistance (Using wheeled walker)  Car Transfer: Moderate Assistance (need assist with L LE and to come into stand from car, difficulty bending forward at trunk)  Ambulation  Ambulation?: Yes  Ambulation 1  Surface: level tile  Device: Rolling Walker  Assistance: Minimal assistance; Contact guard assistance  Quality of Gait: step to pattern leading with right LE - left foot inverted - small step length and narrow to adequate base of support  Gait Deviations: Slow Patsy; Decreased step length; Decreased step height  Distance: 20', 10, 39' with several turns  Stairs/Curb  Stairs?: Yes  Stairs  # Steps : 4  Stairs Height: 6\"  Rails: Bilateral  Device: No Device  Assistance: Minimal assistance  Comment: patient step to pattern Lead with R LE     Balance  Sitting - Static: Good  Sitting - Dynamic: Fair (leans posterior, difficulty bending forward at waist , posterior lean)  Standing - Static: Fair; +  Standing - Dynamic: Fair  Comments: sitting on EOB, posterior lean, difficulty bending forward at waist, occasional minimal assist, CGA with wheeled walker stand, CGA/minimal assist with turns  Exercises  Knee Long Arc Quad: 5  Ankle Pumps: instructed to perform as able 10     Plan   Plan  Times per week: 5-6 x/week  Times per day: Daily  Specific instructions for Next Treatment: progress gait  Current Treatment Recommendations: Strengthening, ADL/Self-care Training, ROM, Functional Mobility Training, Transfer Training, Gait Training, Patient/Caregiver Education & Training, Safety Education & Training, Positioning, Balance Training, Endurance Training, Stair training, Home Exercise Program, Equipment Evaluation, Education, & procurement  Safety Devices  Type of devices:  All fall risk precautions in place, Call light within reach, Chair alarm in place, Left in chair, Nurse notified  Restraints  Initially in place: No      Goals  Short term goals  Time Frame for Short term goals: 1 week  Short term goal 1: bed mobility at minimal assist  Short term goal 2: transfers at [facility]  Short term goal 3: ambulation 48' with wheeled walker with CGA  Short term goal 4: perform curb step ascen/descend with CGA/minimal assist  Short term goal 5: perform 8 steps with CGA with rail  Long term goals  Time Frame for Long term goals : 1,5 to 2 weeks  Long term goal 1: transfer with MI  Long term goal 2: bed mobility with MI  Long term goal 3: Patient ambulate Abel  1560' with MI  Long term goal 4: Perform 12 steps with rail and SBA  Long term goal 5: perform curb step with SBA  Patient Goals   Patient goals : wants to get home again and be able to walk to make simple meal       Therapy Time   Individual Concurrent Group Co-treatment   Time In 1030         Time Out 1215         Minutes 105         Timed Code Treatment Minutes: [address] [facility], PT # [id number]

## 2021-11-13 NOTE — PLAN OF CARE
ARU PATIENT TREATMENT PLAN  [facility]   [address], [address]  [phone]    Yesenia Vargas    : 1950  Acct #: [de-identified]  MRN: [id number]   PHYSICIAN:  Mitch Starkey MD    Rehabilitation Diagnosis:    Severe cervical spinal cord compression with myelopathy, C4,5- Decompression surgery, Dr. Katie Canada, 11/10     Cerebral palsy-risperdal     Depression- Cymbalta, celexa     HLD- fenofibrate     Bowels: Schedule Miralax + Senna S. Follow bowel movements. Enema or suppository if needed.      Bladder: Check PVR x 3. [address] if PVR > 200ml or if any volume is > 500 ml.      Pain: Oxycodone is ordered prn.               ADMIT DATE:2021    Patient Goals: \"be able to get around, get my own shower, make a simple breakfast\"    Admitting Impairments: Decreased functional mobility ; Decreased strength; Decreased ADL status;  Decreased safe awareness; Decreased endurance; Decreased balance; Decreased high-level IADLs    Barriers: UE>LE weakness, decreased coordination, decreased endurance, medical co-morbidities     Participation: Patient lives with sister and NICK, independent with self care and mobility, uses RW in home and WC in community (Sister pushes); she enjoys reading and word search puzzles      CARE PLAN     NURSING:  Yesenia Huizars while on this unit will:     [x] Be continent of bowel and bladder     [x] Have an adequate number of bowel movements  [x] Urinate with no urinary retention >300ml in bladder  [] Complete bladder protocol with small removal  [x] Maintain O2 SATs at 92%  [x] Have pain managed while on ARU       [] Be pain free by discharge   [x] Have no skin breakdown while on ARU  [] Have improved skin integrity via wound measurements  [x] Have no signs/symptoms of infection at the incision site  [x] Be free from injury during hospitalization   [x] Complete education with patient/family with understanding demonstrated for:  [x] Adjustment   [x] Other: Cervical precautions,  Hx cerebral palsy, diet and swallowing precautions. Nursing interventions may include bowel/bladder training, education for medical assistive devices, medication education, O2 saturation management, energy conservation, stress management techniques, fall prevention, alarms protocol, seating and positioning, skin/wound care, pressure relief instruction,dressing changes,  infection protection, DVT prophylaxis, and/or assistance with in room safety with transfers to bed, toilet, wheelchair, shower as well as bathroom activities and hygiene. Patient/caregiver education for:   [x] Disease/sustained injury/management      [x] Medication Use   [x] Surgical intervention   [x] Safety   [x] Body mechanics and or joint protection   [x] Health maintenance         PHYSICAL THERAPY:  Goals:                  Short term goals  Time Frame for Short term goals: 1 week  Short term goal 1: bed mobility at minimal assist  Short term goal 2: transfers at [facility]  Short term goal 3: ambulation 48' with wheeled walker with CGA  Short term goal 4: perform curb step ascen/descend with CGA/minimal assist  Short term goal 5: perform 8 steps with CGA with rail            Long term goals  Time Frame for Long term goals : 1,5 to 2 weeks  Long term goal 1: transfer with MI  Long term goal 2: bed mobility with MI  Long term goal 3: Patient ambulate [address]  1560' with MI  Long term goal 4: Perform 12 steps with rail and SBA  Long term goal 5: perform curb step with SBA  These goals were reviewed with this patient at the time of assessment and [place] is in agreement. Plan of Care: Pt to be seen 90   mins per day for 5-6 day/week 2 weeks.                    Current Treatment Recommendations: Strengthening, ADL/Self-care Training, ROM, Functional Mobility Training, Transfer Training, Gait Training, Patient/Caregiver Education & Training, Safety Education & Training, Positioning, Balance Training, Endurance Training, Stair training, Home Exercise Program, Equipment Evaluation, Education, & procurement      OCCUPATIONAL THERAPY:  Goals:             Short term goals  Time Frame for Short term goals: 1 week  Short term goal 1: Pt will complete ADL transfers w/ SBA  Short term goal 2: Pt will toilet w/ min A  Short term goal 3: Pt will feed and groom mod I  Short term goal 4: Pt will bathe w/ min A using AE prn  Short term goal 5: Pt will dress w/ min A using AE prn :  Long term goals  Time Frame for Long term goals : 10-14 days  Long term goal 1: Pt will complete ADL transfers mod I  Long term goal 2: Pt will toilet mod I  Long term goal 3: Pt will bathe mod I  Long term goal 4: Pt will dress mod I (may need assist w/ TEDs)  Long term goal 5: Pt will complete microwave meal prep task mod I :    These goals were reviewed with this patient at the time of assessment and Chung Valero is in agreement    Plan of Care:  Pt to be seen 90  mins per day for 5-6 day/week 1.5-2 weeks. SPEECH THERAPY: Goals will be left blank if speech is not following this patient. Goals:                                                                                 CASE MANAGEMENT:  Goals:   Assist patient/family with discharge planning, patient/family counseling,   and coordination with insurance during ARU stay.       Admission Period/Goal QIM CODES usual performance per care team   QIM  Admit/Goal Score    Eating  5/6   Oral Hygiene  4/6   [address]  2/6   Shower/Bathe Self  2/6   Upper Body Dressing  3/6   Lower Body Dressing  2/6   Putting on/Taking off Footwear  1/6   Roll Left and Right  3/6   Sit to Lying  3/6   Lying to Sitting on Side of Bed  2/6   Sit to Stand  3/6   Chair/Bed to Chair Transfer  3/6   Toilet Transfer  3/6   Car Transfer  3/6   Walk 10 feet  3/6   Walk 50 feet with 2 Turns  88/6   Walk 150 feet with 2 turns  88   Walk 10 feet on Uneven Surfaces  88/6   1 Step  3/4   4 Steps  3/4   12 Steps  88/4   Picking up Object  88/4   Wheel 50 feet with 2 Turns   Type?  na   Wheel 150 feet with 2 Turns   Type?  na          Harmeet Bazan will be seen a minimum of 3 hours of therapy per day, a minimum of 5 out of 7 days per week. [] In this rare instance due to the nature of this patient's medical involvement, this patient will be seen 15 hours per week (900 minutes within a 7 day period). Treatments may include therapeutic exercises, gait training, transfer training, community reintegration, bed mobility,  self care, home mgmt, cognitive training, energy conservation,dysphagia tx, speech/language/communication therapy, and patient/family education. In addition, dietician/nutritionist may monitor calorie count as well as intake and collaboratively work with SLP on dietary upgrades. Neuropsychology/Psychology may evaluate and provide necessary support.     Medical issues being managed closely and that require 24 hour availability of a physician:   [x] Swallowing Precautions  [x] Bowel/Bladder Fx  [] Weight bearing precautions   [x] Wound Care    [x] Pain Mgmt   [x] Infection Protection   [x] DVT Prophylaxis   [x] Fall Precautions  [x] Fluid/Electrolyte/Nutrition Balance   [] Voice Protection   [] Respiratory  [] Other:    Medical Prognosis: [x] Good  [] Fair    [] Guarded   Total expected IRF days 10-14  Anticipated discharge destination:    [x] Home Independently     [] Home with supervision    []SNF     [] Other                                           Physician anticipated functional outcomes:  Improve gait,transfers, self care to independent with DME as needed to allow safe return home with sister and NICK    IPOC brief synthesis: 44-year-old female patient with cerebral palsy with recent history of increasing debility, inability to walk, generalized weakness.  Patient states that she was not able to perform her ADLs, use her walker and her sister is not able to help her any longer as well.  Work-up revealed the patient had severe cervical spinal cord compression due to large disc herniation and osteophytes at the C4-5 level.  Patient was taken to surgery on 11/10 where she underwent anterior cervical discectomy with fusion and fixation of C4-5 per Dr. Dejon Clark. Presents to acute rehabilitation unit with impaired mobility and self-care below her baseline. She requires comprehensive inpatient rehab program in order to return to community setting. I have reviewed this initial plan of care and agree with its contents:    Title   Name    Date    Time    Physician: Ayala Ruvalcaba MD 11/15/2021, 11:53 AM      Case Mgmt:   Ai Horan [place]     Case Management   [phone]    11/15/2021  4:40 PM      OT: Electronically signed by Abbie Urena OT on 11/13/21 at 2:23 PM EST      PT:Electronically signed by Joshua Chris PT [id number] on 11/13/2021 at 12:43 PM      RN: Odalis Gonzales RN, CRRN 11/15/2021 9:17 am    ST:    :  Eddie Kamara  11/15/2021  1221    Other:

## 2021-11-13 NOTE — H&P
has never smoked. She has never used smokeless tobacco.    family history is not on file. Prior to Admission medications    Medication Sig Start Date End Date Taking? Authorizing Provider   oxyCODONE (ROXICODONE) 5 MG immediate release tablet Take 1 tablet by mouth every 4 hours as needed for Pain for up to 3 days. 11/12/21 11/15/21 Yes Jose M Sampson MD   aluminum & magnesium hydroxide-simethicone (MAALOX) 200-200-20 MG/5ML SUSP suspension Take 15 mLs by mouth every 6 hours as needed for Indigestion 11/12/21  Yes Jose M Sampson MD   methocarbamol (ROBAXIN) 750 MG tablet Take 1 tablet by mouth 3 times daily for 5 days 11/12/21 11/17/21 Yes Jose M Sampson MD   DULoxetine (CYMBALTA) 60 MG extended release capsule Take 60 mg by mouth daily   Yes Historical Provider, MD   fenofibric acid (FIBRICOR) 135 MG CPDR capsule 135 mg daily  9/28/21  Yes Historical Provider, MD   citalopram (CELEXA) 20 MG tablet Take 20 mg by mouth daily    Yes Historical Provider, MD   oxybutynin (DITROPAN) 5 MG tablet Take 10 mg by mouth nightly 7/27/21  Yes Historical Provider, MD   risperiDONE (RISPERDAL) 1 MG tablet Take 1 mg by mouth 2 times daily   Yes Historical Provider, MD   docusate sodium (COLACE) 100 MG capsule Take 100 mg by mouth 2 times daily   Yes Historical Provider, MD   Multiple Vitamins-Minerals (THERAPEUTIC MULTIVITAMIN-MINERALS) tablet Take 1 tablet by mouth daily   Yes Historical Provider, MD         REVIEW OF SYSTEMS:   CONSTITUTIONAL: negative for fevers, chills, diaphoresis, activity change, appetite change, fatigue, night sweats and unexpected weight change. EYES: negative for blurred vision, eye discharge, visual disturbance and icterus. HEENT: negative for hearing loss, tinnitus, ear drainage, sinus pressure, nasal congestion, epistaxis and snoring. RESPIRATORY: Negative for hemoptysis, cough, sputum production.    CARDIOVASCULAR: negative for chest pain, palpitations, exertional chest pressure/discomfort, edema, syncope. GASTROINTESTINAL: negative for nausea, vomiting, diarrhea, constipation, blood in stool and abdominal pain. GENITOURINARY: negative for frequency, dysuria, urinary incontinence, decreased urine volume, and hematuria. HEMATOLOGIC/LYMPHATIC: negative for easy bruising, bleeding and lymphadenopathy. ALLERGIC/IMMUNOLOGIC: negative for recurrent infections, angioedema, anaphylaxis and drug reactions. ENDOCRINE: negative for weight changes and diabetic symptoms including polyuria, polydipsia and polyphagia. MUSCULOSKELETAL: negative for pain, joint swelling, decreased range of motion and muscle weakness. NEUROLOGICAL: negative for headaches, slurred speech, unilateral weakness. + Cerebral Palsy  PSYCHIATRIC/BEHAVIORAL: negative for hallucinations, behavioral problems, confusion and agitation. + Depression    All pertinent positives are noted in the HPI. Physical Examination:  Vitals:   Patient Vitals for the past 24 hrs:   BP Temp Temp src Pulse Resp SpO2 Height Weight   11/13/21 0353 (!) 106/51 97.7 °F (36.5 °C) Oral 73 16 96 % -- 162 lb 0.6 oz (73.5 kg)   11/12/21 2100 (!) 145/66 98.3 °F (36.8 °C) Oral 82 18 95 % -- --   11/12/21 1700 115/71 98.1 °F (36.7 °C) Oral 77 16 93 % 5' (1.524 m) --     Psych: Stable mood, normal judgement, normal affect   Const: No distress  Eyes: Conjunctiva noninjected, no icterus noted; pupils equal, round, and reactive to light. HENT: Atraumatic, normocephalic; Oral mucosa moist, healing anterior neck incision  Neck: Trachea midline, neck supple. No thyromegaly noted. CV: Regular rate and rhythm, grade 2/6 systolic ejection murmur noted  Resp: Lungs clear to auscultation bilaterally, no rales wheezes or ronchi, no retractions. Respirations unlabored. GI: Obese, Soft, nontender, nondistended. Normal bowel sounds. No palpable masses. Neuro: Alert, oriented, appropriate. No cranial nerve deficits appreciated.  Motor examination reveals 3+/5 strength in all four limbs diffusely. Skin: Normal temperature and turgor  MSK: No joint abnormalities noted. Ext: No significant edema appreciated. No varicosities. Lab Results   Component Value Date    WBC 10.1 11/13/2021    HGB 11.3 (L) 11/13/2021    HCT 34.2 (L) 11/13/2021    MCV 90.7 11/13/2021     11/13/2021     Lab Results   Component Value Date    INR 1.06 11/09/2021    PROTIME 12.0 11/09/2021     Lab Results   Component Value Date    CREATININE 0.7 11/13/2021    BUN 16 11/13/2021     11/13/2021    K 4.1 11/13/2021     11/13/2021    CO2 25 11/13/2021     No results found for: ALT, AST, GGT, ALKPHOS, BILITOT    MRI CERVICAL SPINE WO CONTRAST    Result Date: 11/9/2021  EXAMINATION: MRI OF THE CERVICAL SPINE WITHOUT CONTRAST 11/9/2021 7:36 am TECHNIQUE: Multiplanar multisequence MRI of the cervical spine was performed without the administration of intravenous contrast. COMPARISON: None. HISTORY: ORDERING SYSTEM PROVIDED HISTORY: Weakness for past two weeks. Unable to get out of chair as previously FINDINGS: BONES/ALIGNMENT: There is 3 mm anterolisthesis of C6 on 7. Trace retrolisthesis of C4 on 5. The vertebral body heights are maintained. The bone marrow signal appears unremarkable. Multilevel disc space narrowing throughout the cervical spine, most pronounced at C4-5 with moderate to severe disc height loss. SPINAL CORD: Moderate to severe eccentric left cord compression at C4-5 secondary to large left subarticular disc protrusion and osteophytic spurring. There is mild surrounding cord edema. There is also mild right eccentric cord compression at C6-7 related to right subarticular disc protrusion and minimal osteophytic spurring without underlying cord signal abnormality. SOFT TISSUES: No paraspinal mass identified. C2-C3: There is no significant disc protrusion, spinal canal stenosis or neural foraminal narrowing.  C3-C4: Posterior disc osteophyte complex resulting in mild-to-moderate canal stenosis. Severe left and moderate right foraminal stenosis related to uncovertebral and facet hypertrophy. C4-C5: Severe canal stenosis with cord compression and rightward displacement related to large left subarticular disc protrusion and osteophytic spurring. Severe bilateral foraminal stenosis. C5-C6: Posterior disc osteophyte complex along with dorsal ligamentous hypertrophy resulting in moderate canal stenosis. Severe bilateral foraminal stenosis related to uncovertebral and facet hypertrophy. C6-C7: Mild right eccentric cord compression related to subarticular disc protrusion and minimal osteophytic spurring. Moderate bilateral foraminal stenosis. C7-T1: There is no significant disc protrusion, spinal canal stenosis or neural foraminal narrowing. 1. Severe canal stenosis with cord compression and rightward displacement of the cervical cord at C4-5 related to large left subarticular disc protrusion and osteophytic spurring with mild cord edema. 2. Mild right eccentric cord compression at C6-7 without abnormal underlying cord signal. Findings were discussed with Evelyn Michel NP at 11:05 am on 11/9/2021. MRI THORACIC SPINE WO CONTRAST    Result Date: 11/9/2021  EXAMINATION: MRI OF THE THORACIC SPINE WITHOUT CONTRAST  11/9/2021 7:36 am TECHNIQUE: Multiplanar multisequence MRI of the thoracic spine was performed without the administration of intravenous contrast. COMPARISON: None HISTORY: ORDERING SYSTEM PROVIDED HISTORY: Weakness, increased tone TECHNOLOGIST PROVIDED HISTORY: Reason for exam:->Weakness, increased tone Reason for Exam: Weakness, increased tone Acuity: Acute Type of Exam: Initial FINDINGS: BONES/ALIGNMENT: There is normal alignment of the thoracic spine. There is no acute fracture or listhesis. Bone marrow signal intensity is normal. There is multilevel disc desiccation. There is mild disc space narrowing within the mid to lower thoracic spine.  SPINAL CORD: The thoracic spinal cord is normal in size and signal intensities. SOFT TISSUES: There is no paraspinal mass identified. DEGENERATIVE CHANGES: T4-T5: There is focal thickening of the ligamentum flavum mildly effacing the dorsal aspect of the thecal sac. There is no neural foraminal narrowing. T7-8: There is a focal 2 mm central disc protrusion mildly narrowing the spinal canal.  No neural foraminal narrowing is present. T8-T9: There is a focal 1 mm right paracentral disc protrusion. There is no significant spinal canal stenosis or neural foraminal narrowing. T9-T10: There is a disc bulge mildly narrowing the spinal canal and both neural foramina. T10-11: There is a disc bulge and thickening of the ligamentum flavum. There is mild right neural foraminal narrowing. No spinal canal stenosis or left neural foraminal narrowing is present. T11-T12: There is a disc bulge and thickening of the ligamentum flavum mildly narrowing the spinal canal and both neural foramina. T12-L1: There is a disc bulge present. No significant spinal canal stenosis or neural foraminal narrowing is present. 1. Mild spinal canal stenosis and mild bilateral neural foraminal narrowing at T9-10 and T11-T12, as described above. 2. Mild spinal canal stenosis at T7-8, as described above. 3. Additional mild multilevel degenerative changes, as described above. MRI LUMBAR SPINE WO CONTRAST    Result Date: 11/9/2021  EXAMINATION: MRI OF THE LUMBAR SPINE WITHOUT CONTRAST, 11/9/2021 7:37 am TECHNIQUE: Multiplanar multisequence MRI of the lumbar spine was performed without the administration of intravenous contrast. COMPARISON: None. HISTORY: ORDERING SYSTEM PROVIDED HISTORY: lower extremity weakness for the past few days FINDINGS: BONES/ALIGNMENT:Trace retrolisthesis of L1-2 through L3-4. The vertebral body heights are maintained. The bone marrow signal appears unremarkable.  Multilevel endplate degenerative changes and disc space narrowing throughout the lumbar spine with moderate disc height loss L3-4, L4-5 and T12-L1. SPINAL CORD:  The conus terminates normally. SOFT TISSUES: No paraspinal mass identified. Right renal cyst. L1-L2: Disc bulge along with facet arthropathy resulting in mild canal stenosis. There is mild bilateral foraminal stenosis. L2-L3: Disc bulge along with facet arthropathy resulting in mild-to-moderate canal stenosis. There is moderate bilateral foraminal stenosis. L3-L4: Disc bulge along with facet arthropathy resulting in mild-to-moderate canal stenosis. There is moderate bilateral foraminal stenosis. L4-L5: Disc bulge along with facet arthropathy resulting in moderate to severe canal stenosis. There is moderate to severe right and moderate left foraminal stenosis. L5-S1: There is no significant disc protrusion, spinal canal stenosis or neural foraminal narrowing. 1. Multilevel lumbar spondylosis, most pronounced at L4-5 resulting in moderate to severe canal stenosis. XR CHEST PORTABLE    Result Date: 11/7/2021  EXAMINATION: ONE XRAY VIEW OF THE CHEST 11/7/2021 8:10 pm COMPARISON: None. HISTORY: ORDERING SYSTEM PROVIDED HISTORY: Weakness TECHNOLOGIST PROVIDED HISTORY: Reason for exam:->Weakness Reason for Exam: weakness; fatigue Acuity: Acute Type of Exam: Initial FINDINGS: Cardiac silhouette is within normal limits in size. Mediastinal contours are otherwise suboptimally evaluated due to rotated projection. Lungs demonstrate no focal consolidation or pleural effusion. No pneumothorax appreciated on this projection. Scattered atelectasis noted. No airspace disease by radiograph. XR CERVICAL SPINE 1 VW    Result Date: 11/10/2021  Radiology exam is complete. No Radiologist dictation. Please follow up with ordering provider. FLUORO FOR SURGICAL PROCEDURES    Result Date: 11/10/2021  Radiology exam is complete. No Radiologist dictation. Please follow up with ordering provider.      MRI BRAIN WO CONTRAST    Result Date: 11/9/2021  EXAMINATION: MRI OF THE BRAIN WITHOUT CONTRAST  11/9/2021 7:32 am TECHNIQUE: Multiplanar multisequence MRI of the brain was performed without the administration of intravenous contrast. COMPARISON: None. HISTORY: ORDERING SYSTEM PROVIDED HISTORY: weakenss for past two weeks. Unable to get out of chair as previously TECHNOLOGIST PROVIDED HISTORY: Reason for exam:->weakenss for past two weeks. Unable to get out of chair as previously Reason for Exam: weakenss for past two weeks Acuity: Acute Type of Exam: Initial FINDINGS: INTRACRANIAL STRUCTURES/VENTRICLES: No evidence of an acute infarct or other acute parenchymal process. No evidence of acute intracranial hemorrhage. There is no evidence of an extraaxial fluid collection. There is a 2 x 4 mm extra-axial lesion demonstrating T2 hypointense signal along right greater wing sphenoid overlying the right temporal convexity, incompletely characterized although likely reflects an incidental tiny meningioma. No significant mass effect or midline shift. Patchy and confluent fociof T2/FLAIR hyperintensity are present within supratentorial white matter which is a nonspecific finding but likely represents moderate chronic microvascular ischemia. There is moderate generalized volume loss. The lateral and third ventricles are enlarged but the configuration suggests central white matter volume loss. The sellar/suprasellar regions appear unremarkable. The normal signal voids within the major intracranial vessels appear maintained. ORBITS: The visualized portion of the orbits demonstrate no acute abnormality. SINUSES: Mild paranasal sinus inflammatory changes. Mastoid air cells are clear. BONES/SOFT TISSUES: The bone marrow signal intensity appears normal. The soft tissues demonstrate no acute abnormality. 1. No acute cortical infarct or other acute intracranial process.  2. Moderate generalized parenchymal volume loss, chronic small vessel ischemia and disproportionate enlargement of the supratentorial ventricles with configuration suggesting central white matter volume loss. 3. Incidental, suspected tiny meningioma overlying the right temporal convexity without mass effect on the underlying brain parenchyma or underlying parenchymal signal abnormality. The above labs and diagnostic studies have been reviewed by myself upon admission to inpatient rehabilitation. Barriers to Discharge: Patient  has a past medical history of Depression, Hyperlipidemia, and Hypertension. Patient lives at home with her sister in a two-level house. POST ADMISSION PHYSICIAN EVALUATION  The patient has agreed to being admitted to our comprehensive inpatient  rehabilitation facility consisting of at least 180 minutes of therapy a day,  5 out of 7 days a week. The patient/family has a good understanding of our discharge process. The  patient has potential to make improvement and is in need of at least two of  the following multidisciplinary therapies including but not limited to  physical, occupational, respiratory, and speech, nutritional services, wound care,   and prosthetics and orthotics. Given the patients complex condition  and risk of further medical complications, rehabilitation services cannot be  safely provided at a lower level of care such as a skilled nursing facility. I have compared the patients medical and functional status at the time of the  preadmission screening and the same on this date, and there are no significant changes. By signing this document, I acknowledge that I have personally performed a  full physical examination on this patient within 24 hours of admission to  this inpatient rehabilitation facility and have determined the patient to be  able to tolerate the above course of treatment at an intensive level for a  reasonable period of time.  I will be completing a detailed individualized  Plan of Care for this patient by day four of the patients stay based upon the  Preadmission Screen, this Post-Admission Evaluation, and the therapy  evaluations. Assessment and Plan:    Severe cervical spinal cord compression with myelopathy, C4,5- Decompression surgery, Dr. Kajal Perry, 11/10     Cerebral palsy-risperdal    Depression- Cymbalta, celexa    HLD- fenofibrate    Bowels: Schedule Miralax + Senna S. Follow bowel movements. Enema or suppository if needed. Bladder: Check PVR x 3. [facility] if PVR > 200ml or if any volume is > 500 ml. Pain: Oxycodone is ordered prn.        Lupis Lopez MD, 11/13/2021, 8:10 AM

## 2021-11-14 PROCEDURE — 6370000000 HC RX 637 (ALT 250 FOR IP): Performed by: PHYSICAL MEDICINE & REHABILITATION

## 2021-11-14 PROCEDURE — 6360000002 HC RX W HCPCS: Performed by: PHYSICAL MEDICINE & REHABILITATION

## 2021-11-14 PROCEDURE — 94760 N-INVAS EAR/PLS OXIMETRY 1: CPT

## 2021-11-14 PROCEDURE — 2580000003 HC RX 258: Performed by: PHYSICAL MEDICINE & REHABILITATION

## 2021-11-14 PROCEDURE — 1280000000 HC REHAB R&B

## 2021-11-14 RX ADMIN — METHOCARBAMOL TABLETS 750 MG: 750 TABLET, COATED ORAL at 21:21

## 2021-11-14 RX ADMIN — ENOXAPARIN SODIUM 40 MG: 100 INJECTION SUBCUTANEOUS at 07:57

## 2021-11-14 RX ADMIN — POLYETHYLENE GLYCOL 3350 17 G: 17 POWDER, FOR SOLUTION ORAL at 07:57

## 2021-11-14 RX ADMIN — RISPERIDONE 1 MG: 1 TABLET ORAL at 07:58

## 2021-11-14 RX ADMIN — FENOFIBRATE 160 MG: 160 TABLET ORAL at 07:58

## 2021-11-14 RX ADMIN — SENNOSIDES AND DOCUSATE SODIUM 2 TABLET: 50; 8.6 TABLET ORAL at 07:58

## 2021-11-14 RX ADMIN — SODIUM CHLORIDE, PRESERVATIVE FREE 10 ML: 5 INJECTION INTRAVENOUS at 21:22

## 2021-11-14 RX ADMIN — DULOXETINE HYDROCHLORIDE 60 MG: 60 CAPSULE, DELAYED RELEASE ORAL at 07:58

## 2021-11-14 RX ADMIN — CITALOPRAM HYDROBROMIDE 20 MG: 20 TABLET ORAL at 07:58

## 2021-11-14 RX ADMIN — SODIUM CHLORIDE, PRESERVATIVE FREE 10 ML: 5 INJECTION INTRAVENOUS at 07:59

## 2021-11-14 RX ADMIN — METHOCARBAMOL TABLETS 750 MG: 750 TABLET, COATED ORAL at 07:58

## 2021-11-14 RX ADMIN — SENNOSIDES AND DOCUSATE SODIUM 2 TABLET: 50; 8.6 TABLET ORAL at 21:20

## 2021-11-14 RX ADMIN — RISPERIDONE 1 MG: 1 TABLET ORAL at 21:20

## 2021-11-14 RX ADMIN — METHOCARBAMOL TABLETS 750 MG: 750 TABLET, COATED ORAL at 14:03

## 2021-11-14 RX ADMIN — OXYBUTYNIN CHLORIDE 10 MG: 5 TABLET ORAL at 21:20

## 2021-11-14 ASSESSMENT — PAIN SCALES - GENERAL
PAINLEVEL_OUTOF10: 0

## 2021-11-14 NOTE — PLAN OF CARE
Problem: Falls - Risk of:  Goal: Will remain free from falls  Description: Will remain free from falls  11/14/2021 1101 by Jyothi Eagle RN  Outcome: Ongoing  11/13/2021 2347 by Kaleigh Milan RN  Outcome: Ongoing  Note: Pt remains free from falls and accidental injury at this time. Fall precautions in place, bed alarm activated, nonskid socks on, SAFE sign in doorway, fall risk band on pt. Floor free from obstacles. Pt encouraged to call before getting out of bed for any needs, verbalizes understanding. Using call light appropriately. Will continue to monitor. Goal: Absence of physical injury  Description: Absence of physical injury  Outcome: Ongoing     Problem: Skin Integrity:  Goal: Will show no infection signs and symptoms  Description: Will show no infection signs and symptoms  11/14/2021 1101 by Jyothi Eagle RN  Outcome: Ongoing  11/13/2021 2347 by Kaleigh Milan RN  Outcome: Ongoing  Goal: Absence of new skin breakdown  Description: Absence of new skin breakdown  Outcome: Ongoing     Problem: Infection - Surgical Site:  Goal: Will show no infection signs and symptoms  Description: Will show no infection signs and symptoms  11/14/2021 1101 by Jyothi Eagle RN  Outcome: Ongoing  11/13/2021 2347 by Kaleigh Milan RN  Outcome: Ongoing     Problem: Pain:  Goal: Pain level will decrease  Description: Pain level will decrease  11/13/2021 2349 by Kaleigh Milan RN  Outcome: Ongoing  Note: Pain/discomfort being managed with PRN analgesics per MD orders. Pt able to express presence and absence of pain and rate pain appropriately using numerical scale.

## 2021-11-14 NOTE — PROGRESS NOTES
Patient in bed most of shift. Stated she wanted to rest today. Able to voice needs. Transfers with walker x 1. Personal items and call light within reach. Monitored frequently.

## 2021-11-14 NOTE — PLAN OF CARE
Problem: Falls - Risk of:  Goal: Will remain free from falls  Description: Will remain free from falls  Outcome: Ongoing  Note: Pt remains free from falls and accidental injury at this time. Fall precautions in place, bed alarm activated, nonskid socks on, SAFE sign in doorway, fall risk band on pt. Floor free from obstacles. Pt encouraged to call before getting out of bed for any needs, verbalizes understanding. Using call light appropriately. Will continue to monitor.         Problem: Skin Integrity:  Goal: Will show no infection signs and symptoms  Description: Will show no infection signs and symptoms  Outcome: Ongoing     Problem: Infection - Surgical Site:  Goal: Will show no infection signs and symptoms  Description: Will show no infection signs and symptoms  Outcome: Ongoing

## 2021-11-14 NOTE — PLAN OF CARE
Problem: Pain:  Goal: Pain level will decrease  Description: Pain level will decrease  Outcome: Ongoing  Note: Pain/discomfort being managed with PRN analgesics per MD orders. Pt able to express presence and absence of pain and rate pain appropriately using numerical scale. Problem: Infection - Surgical Site:  Goal: Will show no infection signs and symptoms  Description: Will show no infection signs and symptoms  Outcome: Ongoing     Problem: Skin Integrity:  Goal: Will show no infection signs and symptoms  Description: Will show no infection signs and symptoms  Outcome: Ongoing     Problem: Falls - Risk of:  Goal: Will remain free from falls  Description: Will remain free from falls  Outcome: Ongoing  Note: Pt remains free from falls and accidental injury at this time. Fall precautions in place, bed alarm activated, nonskid socks on, SAFE sign in doorway, fall risk band on pt. Floor free from obstacles. Pt encouraged to call before getting out of bed for any needs, verbalizes understanding. Using call light appropriately. Will continue to monitor.

## 2021-11-15 LAB
ANION GAP SERPL CALCULATED.3IONS-SCNC: 13 MMOL/L (ref 3–16)
BUN BLDV-MCNC: 21 MG/DL (ref 7–20)
CALCIUM SERPL-MCNC: 9.5 MG/DL (ref 8.3–10.6)
CHLORIDE BLD-SCNC: 102 MMOL/L (ref 99–110)
CO2: 23 MMOL/L (ref 21–32)
CREAT SERPL-MCNC: 0.6 MG/DL (ref 0.6–1.2)
GFR AFRICAN AMERICAN: >60
GFR NON-AFRICAN AMERICAN: >60
GLUCOSE BLD-MCNC: 91 MG/DL (ref 70–99)
HCT VFR BLD CALC: 32.8 % (ref 36–48)
HEMOGLOBIN: 10.7 G/DL (ref 12–16)
MCH RBC QN AUTO: 29.7 PG (ref 26–34)
MCHC RBC AUTO-ENTMCNC: 32.7 G/DL (ref 31–36)
MCV RBC AUTO: 90.7 FL (ref 80–100)
PDW BLD-RTO: 13.8 % (ref 12.4–15.4)
PLATELET # BLD: 367 K/UL (ref 135–450)
PMV BLD AUTO: 7.8 FL (ref 5–10.5)
POTASSIUM SERPL-SCNC: 4.3 MMOL/L (ref 3.5–5.1)
RBC # BLD: 3.61 M/UL (ref 4–5.2)
SODIUM BLD-SCNC: 138 MMOL/L (ref 136–145)
WBC # BLD: 10 K/UL (ref 4–11)

## 2021-11-15 PROCEDURE — 97110 THERAPEUTIC EXERCISES: CPT

## 2021-11-15 PROCEDURE — 97535 SELF CARE MNGMENT TRAINING: CPT

## 2021-11-15 PROCEDURE — 97530 THERAPEUTIC ACTIVITIES: CPT

## 2021-11-15 PROCEDURE — 6360000002 HC RX W HCPCS: Performed by: PHYSICAL MEDICINE & REHABILITATION

## 2021-11-15 PROCEDURE — 2580000003 HC RX 258: Performed by: PHYSICAL MEDICINE & REHABILITATION

## 2021-11-15 PROCEDURE — 97116 GAIT TRAINING THERAPY: CPT

## 2021-11-15 PROCEDURE — 6370000000 HC RX 637 (ALT 250 FOR IP): Performed by: PHYSICAL MEDICINE & REHABILITATION

## 2021-11-15 PROCEDURE — 85027 COMPLETE CBC AUTOMATED: CPT

## 2021-11-15 PROCEDURE — 36415 COLL VENOUS BLD VENIPUNCTURE: CPT

## 2021-11-15 PROCEDURE — 1280000000 HC REHAB R&B

## 2021-11-15 PROCEDURE — 80048 BASIC METABOLIC PNL TOTAL CA: CPT

## 2021-11-15 RX ADMIN — OXYBUTYNIN CHLORIDE 10 MG: 5 TABLET ORAL at 21:22

## 2021-11-15 RX ADMIN — SODIUM CHLORIDE, PRESERVATIVE FREE 10 ML: 5 INJECTION INTRAVENOUS at 09:11

## 2021-11-15 RX ADMIN — RISPERIDONE 1 MG: 1 TABLET ORAL at 09:09

## 2021-11-15 RX ADMIN — RISPERIDONE 1 MG: 1 TABLET ORAL at 21:22

## 2021-11-15 RX ADMIN — ENOXAPARIN SODIUM 40 MG: 100 INJECTION SUBCUTANEOUS at 09:08

## 2021-11-15 RX ADMIN — SENNOSIDES AND DOCUSATE SODIUM 2 TABLET: 50; 8.6 TABLET ORAL at 21:22

## 2021-11-15 RX ADMIN — FENOFIBRATE 160 MG: 160 TABLET ORAL at 09:09

## 2021-11-15 RX ADMIN — METHOCARBAMOL TABLETS 750 MG: 750 TABLET, COATED ORAL at 21:22

## 2021-11-15 RX ADMIN — DULOXETINE HYDROCHLORIDE 60 MG: 60 CAPSULE, DELAYED RELEASE ORAL at 09:09

## 2021-11-15 RX ADMIN — METHOCARBAMOL TABLETS 750 MG: 750 TABLET, COATED ORAL at 09:09

## 2021-11-15 RX ADMIN — METHOCARBAMOL TABLETS 750 MG: 750 TABLET, COATED ORAL at 15:26

## 2021-11-15 RX ADMIN — CITALOPRAM HYDROBROMIDE 20 MG: 20 TABLET ORAL at 09:09

## 2021-11-15 RX ADMIN — SODIUM CHLORIDE, PRESERVATIVE FREE 10 ML: 5 INJECTION INTRAVENOUS at 21:24

## 2021-11-15 ASSESSMENT — PAIN SCALES - GENERAL
PAINLEVEL_OUTOF10: 3
PAINLEVEL_OUTOF10: 3

## 2021-11-15 ASSESSMENT — PAIN DESCRIPTION - PAIN TYPE: TYPE: ACUTE PAIN

## 2021-11-15 ASSESSMENT — PAIN DESCRIPTION - DESCRIPTORS: DESCRIPTORS: DISCOMFORT

## 2021-11-15 ASSESSMENT — PAIN DESCRIPTION - ONSET: ONSET: ON-GOING

## 2021-11-15 ASSESSMENT — PAIN DESCRIPTION - FREQUENCY: FREQUENCY: INTERMITTENT

## 2021-11-15 ASSESSMENT — PAIN DESCRIPTION - LOCATION: LOCATION: NECK

## 2021-11-15 ASSESSMENT — PAIN DESCRIPTION - ORIENTATION: ORIENTATION: RIGHT;ANTERIOR

## 2021-11-15 NOTE — PROGRESS NOTES
Physical Therapy  Facility/Department: [address] IP REHAB  Daily Treatment Note  NAME: Tita Green  : 1950  MRN: [id number]    Date of Service: 11/15/2021    Discharge Recommendations:  Continue to assess pending progress, Patient would benefit from continued therapy after discharge   PT Equipment Recommendations  Equipment Needed: No    Assessment   Body structures, Functions, Activity limitations: Decreased functional mobility ; Decreased strength; Decreased safe awareness; Decreased endurance; Decreased balance; Decreased posture  Assessment: Ms. Dorean Rubinstein is demonstrating improved strength though endurance is still limiting. She was able to ambulate household distance with CGA, but as fatigue increases patient has poor clearance of bilateral LE. This causes increased difficulty advancing bilateral LE in swing phase. Patient was also able to ascend and descend 8 steps with bilateral rails with CGA, but does have fatigue requiring seated rest break afterwards. Patient was very limited by difficulty hearing PT this AM. She reports her sister is going to help work on her hearing aides this afternoon and hopefully her hearing will improve. Patient continues to be below baseline and will benefit from continued skilled therapy for strengthening, gait training, and functional mobility training to allow for return home. Treatment Diagnosis: impaired mobility  Specific instructions for Next Treatment: progress gait  Prognosis: Fair  Decision Making: Medium Complexity  PT Education: PT Role; Plan of Care; General Safety; Functional Mobility Training; Transfer Training; Gait Training; Precautions; Adaptive Device Training; Energy Conservation  REQUIRES PT FOLLOW UP: Yes  Activity Tolerance  Activity Tolerance: Patient Tolerated treatment well; Patient limited by endurance     Patient Diagnosis(es): There were no encounter diagnoses.      has a past medical history of Depression, Hyperlipidemia, and Hypertension. has a past surgical history that includes cervical fusion (N/A, 11/10/2021). Social/Functional History  Lives With:  (sister and NICK)  Type of Home: House  Home Layout: Two level, Bed/Bath upstairs, 1/2 bath on main level (2 + basement. Pt has been sleeping in a lounge chair on the first floor)  Home Access: Stairs to enter without rails  Entrance Stairs - Number of Steps: 1 thru garage + flight upstairs to bedroom with one rail  Bathroom Shower/Tub: Walk-in shower, Shower chair with back (showers in basement)  Bathroom Toilet: Standard (uses towel bar to stand sometimes)  Bathroom Equipment: Shower chair  Bathroom Accessibility: Accessible  Home Equipment: Rolling walker, Wheelchair-manual, 4 wheeled walker  ADL Assistance: Independent  Homemaking Assistance:  (microwave meals, family assist with heaving cleaning and laundry)  Ambulation Assistance: Independent (RW in home, w/c in community (sister pushes w/c))  Transfer Assistance: Independent  Active : No  Patient's  Info: Sister drives  Type of occupation: worked for [employer]: Reading, word search puzzles  Additional Comments: Sister works during the day and pt home alone. Pt denies falls. Patient reports she has a regular wheeled walker on each floor of the house. The four wheeled walker is only used when out with her sister. Restrictions  Restrictions/Precautions  Restrictions/Precautions: Fall Risk  Position Activity Restriction  Other position/activity restrictions: ACDF precautions (no excessive cervical motion), Hx of CP     Subjective   General  Chart Reviewed: Yes  Additional Pertinent Hx: Pt is a 73 y.o. female with hx of CP who presented to the ED on 11/7/21 with progressive generalized weakness and debility. Per Dr. Rip Kc H&P, \"73year-old female patient with cerebral palsy with recent history of increasing debility, inability to walk, generalized weakness.   Patient states that she was not able to perform her ADLs, use her walker and her sister is not able to help her any longer as well. Work-up revealed the patient had severe cervical spinal cord compression due to large disc herniation and osteophytes at the C4-5 level. Patient was taken to surgery on 11/10 where she underwent anterior cervical discectomy with fusion and fixation of C4-5 per Dr. Gianna Arora. Response To Previous Treatment: Patient with no complaints from previous session. Family / Caregiver Present: No  Referring Practitioner: Dr. Vanessa Fuentes: Patient is very hard of hearing and unable to hear the therapist in the therapy gym. PT had to write out things at times. She stated no complaints of pain at this time. General Comment  Comments: Patient goes by Mariah Veliz            Objective      Transfers  Sit to Stand: Contact guard assistance (increased time but poor hand placement. Patient reports this is how she does it at home)  Stand to sit: Contact guard assistance (poor hand placement and does not follow cueing for correct hand placement)    Ambulation  Ambulation?: Yes  More Ambulation?: No  Ambulation 1  Surface: level tile  Device: Rolling Walker  Assistance: Contact guard assistance  Quality of Gait: step to pattern leading with right LE - left foot inverted - small step length with reduced bilateral foot clearance  Gait Deviations: Slow Patsy; Decreased step length; Decreased step height  Distance: 61' with two turns  Comments: Patient with minor SOB, but she reported no fatigue and states she feels she is \"doing pretty good. \"    Stairs/Curb  Stairs?: Yes  Stairs  # Steps : 8  Stairs Height: 6\"  Rails: Bilateral  Device: No Device  Assistance: Contact guard assistance  Comment: Patient used a non-reciprocal pattern but was able to complete 8 steps. She had increased fatigue and had difficulty clearing feet to advance them as she ambulated back to the wheelchair after the stairs.           Exercises  Hamstring Sets: x15

## 2021-11-15 NOTE — PLAN OF CARE
Problem: Falls - Risk of:  Goal: Will remain free from falls  Description: Will remain free from falls  11/14/2021 2326 by Karen Sanabria RN  Outcome: Ongoing  Note: Fall risk precautions in place. Bed in lowest position with wheels locked, bed alarm in place and activated, SAFE sign at doorway, non-skid socks on pt, fall risk ID on pt, and call light in reach. Patient encouraged to call before getting out of bed and for any other needs or complaints. Problem: Skin Integrity:  Goal: Will show no infection signs and symptoms  Description: Will show no infection signs and symptoms  11/14/2021 2326 by Karen Sanabria RN  Outcome: Ongoing  Note: Skin integrity being maintained at baseline at this time. Pt is showing not signs and symptoms of infection. Will continue to assess and monitor. Problem: Infection - Surgical Site:  Goal: Will show no infection signs and symptoms  Description: Will show no infection signs and symptoms  11/14/2021 2326 by Karen Sanabria RN  Outcome: Ongoing  Note: Skin integrity being maintained at baseline at this time. Pt is showing not signs and symptoms of infection. Will continue to assess and monitor. Problem: Pain:  Goal: Pain level will decrease  Description: Pain level will decrease  Outcome: Ongoing  Note: Pain/discomfort being managed with scheduled analgesics per MD orders. Pt able to express presence and absence of pain and rate pain appropriately using numerical scale.

## 2021-11-15 NOTE — PROGRESS NOTES
Patient admitted to rehab with cervical fusion C4-C5. A/Ox4. Transfers with SB and walker with CGA x1. Mobility restrictions: none. On dysphagia soft and bite sized diet, tolerating well. Medications taken whole with thins. On BLE knee high TEDS for DVT prophylaxis. Skin: right anterior neck incision closed with steri strips - EDDY, blanchable redness on sacrum. Oxygen: on RA. LDA: IV right hand. Has been continent of bowel and incontinent of bladder. LBM 11/14. Chair/bed alarms in use and call light in reach. Will monitor for safety.

## 2021-11-15 NOTE — PROGRESS NOTES
Department of Physical Medicine & Rehabilitation  Progress Note    Patient Identification:  Cesilia Lopez  [id number]  : 1950  Admit date: 2021    Chief Complaint: Stenosis of cervical spine with myelopathy ([address]    Subjective:   No acute events overnight. Patient seen this am sitting up in room. She reports coughing with po intake. She has had some soreness in her throat since surgery. Denies fevers/chills, cold/flu symptoms. Labs reviewed. ROS: No f/c, n/v, cp     Objective:  Patient Vitals for the past 24 hrs:   BP Temp Temp src Pulse Resp SpO2 Weight   11/15/21 0434 101/63 97.3 °F (36.3 °C) Oral 73 16 95 % 159 lb 6.3 oz (72.3 kg)   21 1945 105/65 98 °F (36.7 °C) Oral 82 16 94 % --   21 1415 119/82 97.5 °F (36.4 °C) Oral 76 16 94 % --     Const: Alert. No distress, pleasant. HEENT: Normocephalic, atraumatic. Normal sclera/conjunctiva. MMM. +Strabismus. CV: Regular rate and rhythm. Resp: No respiratory distress. Lungs CTAB. Abd: Soft, nontender, nondistended, NABS+   Ext: No edema. Neuro: Alert, oriented. Left hemiparesis. Psych: Cooperative, appropriate mood and affect    Laboratory data: Available via EMR.    Last 24 hour lab  Recent Results (from the past 24 hour(s))   CBC    Collection Time: 11/15/21  7:29 AM   Result Value Ref Range    WBC 10.0 4.0 - 11.0 K/uL    RBC 3.61 (L) 4.00 - 5.20 M/uL    Hemoglobin 10.7 (L) 12.0 - 16.0 g/dL    Hematocrit 32.8 (L) 36.0 - 48.0 %    MCV 90.7 80.0 - 100.0 fL    MCH 29.7 26.0 - 34.0 pg    MCHC 32.7 31.0 - 36.0 g/dL    RDW 13.8 12.4 - 15.4 %    Platelets 231 218 - 252 K/uL    MPV 7.8 5.0 - 10.5 fL   Basic Metabolic Panel    Collection Time: 11/15/21  7:29 AM   Result Value Ref Range    Sodium 138 136 - 145 mmol/L    Potassium 4.3 3.5 - 5.1 mmol/L    Chloride 102 99 - 110 mmol/L    CO2 23 21 - 32 mmol/L    Anion Gap 13 3 - 16    Glucose 91 70 - 99 mg/dL    BUN 21 (H) 7 - 20 mg/dL    CREATININE 0.6 0.6 - 1.2 mg/dL    GFR Non- >60 >60    GFR African American >60 >60    Calcium 9.5 8.3 - 10.6 mg/dL       Therapy progress:  PT  Position Activity Restriction  Other position/activity restrictions: ACDF precautions (no excessive cervical motion), Hx of CP  Objective     Sit to Stand: Contact guard assistance (increased time but poor hand placement. Patient reports this is how she does it at home)  Stand to sit: Contact guard assistance (poor hand placement and does not follow cueing for correct hand placement)  Bed to Chair: Contact guard assistance, Minimal assistance (Using wheeled walker)  Device: [address]: Contact guard assistance  Distance: 61' with two turns  OT  PT Equipment Recommendations  Equipment Needed: No  Toilet - Technique: Ambulating (RW)  Equipment Used: Grab bars  Toilet Transfers Comments: min verbal/tactile cues to line up w/ commode  Assessment        SLP          Body mass index is 31.13 kg/m². Assessment and Plan:    Cervical disc herniation with myelopathy  -s/p urgent C4-5 ACDF (11/10 with Dr. Dejon Clark)  -Wound care  -PT/OT    Dysphagia   -Will ask SLP to evaluate    Cerebral palsy  -Baseline left hemiparesis  -PT/OT    HLD  -fenofibrate    Depression  -citalopram, duloxetine, risperidone    Bladder  -High risk retention  -Monitor PVRs, straight cath prn >300    Bowel  -High risk constipation  -senna+colace BID, prn miralax, MoM, bisacodyl supp    Pain control  -oxycodone prn, methocarbamol    DVT ppx  -None per Nsgy, SCDs    Rehab Progress: Making progress. Working on functional mobility, balance, compensatory strategies for ADLs  Anticipated Dispo: home with sister  Services/DME: TBD  ELOS: 2 weeks      Radhase Richy Ruvalcaba MD 11/15/2021, 11:53 AM

## 2021-11-15 NOTE — CARE COORDINATION
Chart Reviewed. Met with patient to introduce  role, initiate discussion regarding dc planning and to inform of weekly Team Conferences. She prefers to be called Jayne. She does give permission to include her sister in her dc planning. Pt is very hard of hearing even with hearing aides in place. SOCIAL WORK ASSESSMENT      GOAL:     To return home with sister and NICK. HOME SITUATION:   Pt lives with sister and NICK in a house with one step entry. There are 12 steps up to her bedroom and to shower she needs to get to the basement. She does ambulate with wh walker in the house and a 4 wh walker outside of the house. She reports she is independent with her personal care needs while dependent for household chores. Her sister sets up a mediset box and pt takes her own meds. Her sister does her finances. PCP:  Dr Aris Corbett            Personal Goals:           PRIOR LEVEL OF FUNCTIONING:               PERSONAL CARE:     Totally independent             Drives:   no            Finances:  Sister does them            Meals:  Can microwave meals but does not cook            Grocery Shopping:    dependent          Medications:   Sister sets up her mediset box and pt takes her own meds. DME CURRENTLY AT HOME:  Shower chair, wh walker in house and 4 wh walker when outside, wheelchair      CURRENT HOME CARE/SERVICES:   None currently. Informed her of possible post acute services such as home care or out patient services. She is open to what MD orders for her. Provided her with CMS star rated list of agencies for her review. PREFERRED HOME CARE:  TBD      TEAM CONFERENCE DAY:   Wednesdays. Informed her of weekly Team conferences where team will review her progress, goals, DME recommendations and DC date. This worker will return to give this update and plan for DC needs.     LSW informed patient of preferred  time on date of discharge which is between 8 - 12 noon. LSW informed patient of recommendation for PCP visit within 7 days post discharge.     Mahendra Roblero [place]     Case Management   [phone]    11/15/2021  4:45 PM

## 2021-11-15 NOTE — PLAN OF CARE
Problem: Falls - Risk of:  Goal: Will remain free from falls  Description: Will remain free from falls  Outcome: Ongoing  Goal: Absence of physical injury  Description: Absence of physical injury  Outcome: Ongoing     Problem: Skin Integrity:  Goal: Will show no infection signs and symptoms  Description: Will show no infection signs and symptoms  Outcome: Ongoing  Goal: Absence of new skin breakdown  Description: Absence of new skin breakdown  Outcome: Ongoing     Problem: Infection - Surgical Site:  Goal: Will show no infection signs and symptoms  Description: Will show no infection signs and symptoms  Outcome: Ongoing     Problem: ABCDS Injury Assessment  Goal: Absence of physical injury  Outcome: Ongoing     Problem: Pain:  Goal: Pain level will decrease  Description: Pain level will decrease  Outcome: Ongoing  Goal: Control of acute pain  Description: Control of acute pain  Outcome: Ongoing     Problem: Daily Care:  Goal: Daily care needs are met  Description: Daily care needs are met  Outcome: Ongoing     Problem: Discharge Planning:  Goal: Patients continuum of care needs are met  Description: Patients continuum of care needs are met  Outcome: Ongoing     Problem: IP BLADDER/VOIDING  Goal: LTG - patient will achieve acceptable level of continence  Outcome: Ongoing  Goal: STG - Patient demonstrates no accidents  Outcome: Ongoing     Problem: IP SWALLOWING  Goal: LTG - Patient will demonstrate safe swallowing Intervention/techniques  Outcome: Ongoing  Goal: STG - Patient will follow recommended swallowing strategies  Outcome: Ongoing

## 2021-11-15 NOTE — PROGRESS NOTES
Occupational Therapy  Facility/Department: [address] REHAB  Daily Treatment Note  NAME: Marichuy Ko  : 1950  MRN: [id number]    Date of Service: 11/15/2021    Discharge Recommendations:  Home with assist PRN, Patient would benefit from continued therapy after discharge  OT Equipment Recommendations  Equipment Needed: Yes  Mobility Devices: ADL Assistive Devices  ADL Assistive Devices: Reacher; Sock-Aid Hard; Long-handled Shoe María Bologna  Other: Continue to assess    Assessment   Performance deficits / Impairments: Decreased functional mobility ; Decreased strength; Decreased ADL status; Decreased safe awareness; Decreased endurance; Decreased balance; Decreased high-level IADLs  Assessment: Pt tolerated tx session well today. She required min A for bed mobility and completed fxl transfers and mobility w/ CGA using RW w/ cues for RW safety. She completed seated grooming w/ setup/SBA, UB bathing w/ min A, LB bathing w/ min A, UB dressing w/ setup, LB dressing w/ max A, toileting w/ min A. Initiated training w/ AE today for LB dressing but pt struggled d/t baseline fine/gross motor difficulties, will need continued practice to master the skill. She continues to function below baseline and will benefit from continued therapy on ARU to increase her functional safety and independence prior to returning home w/ her sister. Treatment Diagnosis: decreased: ADLs, fxl transfers/mobility, IADLs  Prognosis: Good  OT Education: Plan of Care; OT Role; Transfer Training; ADL Adaptive Strategies; Precautions; Energy Conservation; Equipment  Barriers to Learning: hearing  REQUIRES OT FOLLOW UP: Yes  Activity Tolerance  Activity Tolerance: Patient limited by fatigue; Patient Tolerated treatment well  Safety Devices  Safety Devices in place: Yes  Type of devices: Call light within reach; Nurse notified; Left in chair; All fall risk precautions in place;  Chair alarm in place; Gait belt         Patient Diagnosis(es): There were no encounter diagnoses. has a past medical history of Depression, Hyperlipidemia, and Hypertension. has a past surgical history that includes cervical fusion (N/A, 11/10/2021). Restrictions  Restrictions/Precautions  Restrictions/Precautions: Fall Risk  Position Activity Restriction  Other position/activity restrictions: ACDF precautions. Hx of CP  Subjective   General  Chart Reviewed: Yes  Patient assessed for rehabilitation services?: Yes  Additional Pertinent Hx: per H&P: \"73year-old female patient with cerebral palsy with recent history of increasing debility, inability to walk, generalized weakness. Patient states that she was not able to perform her ADLs, use her walker and her sister is not able to help her any longer as well. Work-up revealed the patient had severe cervical spinal cord compression due to large disc herniation and osteophytes at the C4-5 level. Patient was taken to surgery on 11/10 where she underwent anterior cervical discectomy with fusion and fixation of C4-5 per Dr. Stone Hernandez. Patient started in therapies today. Patient lives at home with her sister in a two-level house. \"  Family / Caregiver Present: No  Referring Practitioner: Jose  Diagnosis: ANTERIOR CERVICAL DISCECTOMY WITH FUSION, WITH INTERBODY IMPLANT AND WITH PLATE AND SCREW FIXATION C4-5 (N/A Spine Cervical)  Subjective  Subjective: Pt met b/s for OT. Pt in bed, agreeable to therapy and shower. Denied pain      Orientation  Orientation  Overall Orientation Status: Within Functional Limits  Objective    ADL  Feeding: Setup; Increased time to complete (Pt's food was pre-cut; while she was eating had a coughing fit lasting ~30 seconds, reports at home she normally eats soft food with no problem. OT sat pt up more forward in the chair which seemed to help, notified RN)  Grooming: Setup; Stand by assistance;  Increased time to complete  UE Bathing: Setup; Minimal assistance (seated on shower chair, OT cleaned/dried surgical site with dial soap)  LE Bathing: Minimal assistance; Increased time to complete; Setup (Pt used LH sponge to reach lower LEs, required assist to dry. Stood w/ CGA using GBs while OT bathed posterior periarea)  UE Dressing: Setup (pullover shirt)  LE Dressing: Maximum assistance (pt used reacher to push briefs/pants off feet to doff. She used the reacher to thread briefs over feet w/ extra time, required min/mod A for pants d/t putting on backwards at first; assist to pull up over hips while standing at sink. Dep. for socks/TEDs)  Toileting: Minimal assistance (Pt voided urine and small BM from commode, min A for posterior hygiene. Able to manage pants/briefs down, doffed for bathing)  Additional Comments: Pt completed shower from shower chair w/ towel on floor for non-skid surface. IV covered and kept dry. Pt doffed hearing aids prior to shower and donned following shower, however toward the end of the hearing aids stopped working - OT assisted to change the batteries which did not help. Called pt's sister to let her know, she reports she will come in today to help        Balance  Sitting Balance: Stand by assistance  Standing Balance: Contact guard assistance (RW)  Functional Mobility  Functional - Mobility Device: Rolling Walker  Activity: To/from bathroom  Assist Level: Contact guard assistance  Functional Mobility Comments: Fxl mobility from bed > commode > shower chair > sink w/ CGA using RW, extra time required; used w/c from sink > recliner to conserve time/energy  Toilet Transfers  Toilet - Technique: Ambulating (RW)  Equipment Used: Grab bars  Toilet Transfer: Contact guard assistance  Toilet Transfers Comments: min verbal/tactile cues to line up w/ commode  Shower Transfers  Shower - Transfer From: Walker  Shower - Transfer Type: To and From  Shower - Transfer To:  Shower seat with back  Shower - Technique: Ambulating (RW)  Shower Transfers: Contact Guard  Wheelchair Bed Transfers  Wheelchair/Bed - Technique: Ambulating (RW)  Equipment Used: Wheelchair; Other; Bed (recliner)  Level of Asssistance: Minimal assistance  Wheelchair Transfers Comments: min cues for hand placement/ RW safety when sitting  Bed mobility  Sit to Supine: Minimal assistance (HOB slightly elevated and use of bedrail)                          Cognition  Overall Cognitive Status: Exceptions (hard of hearing)  Arousal/Alertness: Appropriate responses to stimuli  Following Commands: Follows one step commands with increased time;  Follows one step commands with repetition  Attention Span: Appears intact  Memory: Decreased short term memory  Problem Solving: Assistance required to generate solutions; Assistance required to implement solutions; Decreased awareness of errors  Initiation: Requires cues for some  Sequencing: Requires cues for some                                         Plan   Plan  Times per week: 5-6  Times per day: Twice a day  Plan weeks: 1.5-2  Current Treatment Recommendations: Strengthening, Functional Mobility Training, Gait Training, Endurance Training, Balance Training, ROM, Self-Care / ADL, Patient/Caregiver Education & Training, Safety Education & Training    Goals  Short term goals  Time Frame for Short term goals: 1 week  Short term goal 1: Pt will complete ADL transfers w/ SBA  Short term goal 2: Pt will toilet w/ min A  Short term goal 3: Pt will feed and groom mod I  Short term goal 4: Pt will bathe w/ min A using AE prn  Short term goal 5: Pt will dress w/ min A using AE prn  Long term goals  Time Frame for Long term goals : 10-14 days  Long term goal 1: Pt will complete ADL transfers mod I  Long term goal 2: Pt will toilet mod I  Long term goal 3: Pt will bathe mod I  Long term goal 4: Pt will dress mod I (may need assist with TEDs)  Long term goal 5: Pt will complete microwave meal prep task mod I  Patient Goals   Patient goals : \"be able to get around, get my own shower, make a simple breakfast\"       Therapy Time   Individual Concurrent Group Co-treatment   Time In 0745         Time Out 0915         Minutes [address] [place], [place] [address]

## 2021-11-16 PROCEDURE — 92526 ORAL FUNCTION THERAPY: CPT

## 2021-11-16 PROCEDURE — 2580000003 HC RX 258: Performed by: PHYSICAL MEDICINE & REHABILITATION

## 2021-11-16 PROCEDURE — 97535 SELF CARE MNGMENT TRAINING: CPT

## 2021-11-16 PROCEDURE — 92610 EVALUATE SWALLOWING FUNCTION: CPT

## 2021-11-16 PROCEDURE — 97530 THERAPEUTIC ACTIVITIES: CPT

## 2021-11-16 PROCEDURE — 1280000000 HC REHAB R&B

## 2021-11-16 PROCEDURE — 97110 THERAPEUTIC EXERCISES: CPT

## 2021-11-16 PROCEDURE — 97116 GAIT TRAINING THERAPY: CPT

## 2021-11-16 PROCEDURE — 94761 N-INVAS EAR/PLS OXIMETRY MLT: CPT

## 2021-11-16 PROCEDURE — 6370000000 HC RX 637 (ALT 250 FOR IP): Performed by: PHYSICAL MEDICINE & REHABILITATION

## 2021-11-16 RX ADMIN — METHOCARBAMOL TABLETS 750 MG: 750 TABLET, COATED ORAL at 21:51

## 2021-11-16 RX ADMIN — RISPERIDONE 1 MG: 1 TABLET ORAL at 08:19

## 2021-11-16 RX ADMIN — SODIUM CHLORIDE, PRESERVATIVE FREE 10 ML: 5 INJECTION INTRAVENOUS at 08:18

## 2021-11-16 RX ADMIN — METHOCARBAMOL TABLETS 750 MG: 750 TABLET, COATED ORAL at 08:15

## 2021-11-16 RX ADMIN — RISPERIDONE 1 MG: 1 TABLET ORAL at 21:52

## 2021-11-16 RX ADMIN — OXYBUTYNIN CHLORIDE 10 MG: 5 TABLET ORAL at 21:52

## 2021-11-16 RX ADMIN — DULOXETINE HYDROCHLORIDE 60 MG: 60 CAPSULE, DELAYED RELEASE ORAL at 08:15

## 2021-11-16 RX ADMIN — FENOFIBRATE 160 MG: 160 TABLET ORAL at 08:15

## 2021-11-16 RX ADMIN — SODIUM CHLORIDE, PRESERVATIVE FREE 5 ML: 5 INJECTION INTRAVENOUS at 21:58

## 2021-11-16 RX ADMIN — METHOCARBAMOL TABLETS 750 MG: 750 TABLET, COATED ORAL at 13:42

## 2021-11-16 RX ADMIN — CITALOPRAM HYDROBROMIDE 20 MG: 20 TABLET ORAL at 08:15

## 2021-11-16 ASSESSMENT — PAIN SCALES - GENERAL: PAINLEVEL_OUTOF10: 0

## 2021-11-16 NOTE — PROGRESS NOTES
Patient is resting in bed, uses the call light appropriately to use the bathroom. Ambulates with the walker and the gait belt and standby assistance. Pain under control at the moment. Surgical incision anterior neck is cdi. Mod rush & order noted under right breast, inter dry- applied. Baseline twitching of the face and contracture of the L. Leg/foot. She has a history of cerebral palsy She is incontinent of urine at times and continent of stool. LBM 11/15. Bed alarm is in use, call light is within reach. Will continue to monitor.

## 2021-11-16 NOTE — PROGRESS NOTES
Denied any swallowing issues this am, medications reviewed, declined laxatives, states sister Mariia Shock sets up pill box.

## 2021-11-16 NOTE — PROGRESS NOTES
Occupational Therapy  Facility/Department: [address] IP REHAB  Daily Treatment Note  NAME: Enedelia Pereyra  : 1950  MRN: [id number]    Date of Service: 2021    Discharge Recommendations:  Home with assist PRN, Patient would benefit from continued therapy after discharge  OT Equipment Recommendations  ADL Assistive Devices: Reacher; Sock-Aid Hard; Long-handled Shoe Horn  Other: Continue to assess    Assessment   Performance deficits / Impairments: Decreased functional mobility ; Decreased strength; Decreased ADL status; Decreased safe awareness; Decreased endurance; Decreased balance; Decreased high-level IADLs  Assessment: Pt completing transfers, mob with RW with CGA, consistent cues for safety. Pt CGA cues for bed mob on std bed. Pt donned shoes with tylastic laces added, Min/Mod A and w/use of reacher, long handled shoe horn, increased time, cues for technique. Pt overall tolerating session fairly well. Cont poc to maximize function for return home. Treatment Diagnosis: decreased: ADLs, fxl transfers/mobility, IADLs  Prognosis: Good  History: 74 yo F w/ PMHx CP who lives in a 2 story home w/ her sister and NICK. House has 1 DAKOTA to enter and 1 flight of steps to pt's bedroom and full bathroom. Pt has been sleeping in a recliner chair on main level, which only has a half bath. She was previously independent, her sister works during the day. Pt has RW, w/c, shower chair, and GBs  OT Education: Plan of Care; OT Role; Transfer Training; ADL Adaptive Strategies; Precautions; Energy Conservation; Equipment  Barriers to Learning: hearing  REQUIRES OT FOLLOW UP: Yes  Activity Tolerance  Activity Tolerance: Patient limited by fatigue; Patient Tolerated treatment well  Safety Devices  Safety Devices in place: Yes  Type of devices: Gait belt (to PT)         Patient Diagnosis(es): There were no encounter diagnoses. has a past medical history of Depression, Hyperlipidemia, and Hypertension.    has a past surgical history that includes cervical fusion (N/A, 11/10/2021). Restrictions  Restrictions/Precautions  Restrictions/Precautions: Fall Risk  Position Activity Restriction  Other position/activity restrictions: ACDF precautions (no excessive cervical motion), Hx of CP  Subjective   General  Chart Reviewed: Yes, Progress Notes  Patient assessed for rehabilitation services?: Yes  Additional Pertinent Hx: per H&P: \"73year-old female patient with cerebral palsy with recent history of increasing debility, inability to walk, generalized weakness. Patient states that she was not able to perform her ADLs, use her walker and her sister is not able to help her any longer as well. Work-up revealed the patient had severe cervical spinal cord compression due to large disc herniation and osteophytes at the C4-5 level. Patient was taken to surgery on 11/10 where she underwent anterior cervical discectomy with fusion and fixation of C4-5 per Dr. Jorge Hernandez. Patient started in therapies today. Patient lives at home with her sister in a two-level house. \"  Family / Caregiver Present: No  Referring Practitioner: Heis  Diagnosis: ANTERIOR CERVICAL DISCECTOMY WITH FUSION, WITH INTERBODY IMPLANT AND WITH PLATE AND SCREW FIXATION C4-5 (N/A Spine Cervical)  Subjective  Subjective: Pt seen in dept. Pt agreeable to OT and denied pain. Orientation  Orientation  Overall Orientation Status: Within Functional Limits  Objective    ADL  LE Dressing: Minimal assistance; Moderate assistance; Verbal cueing; Increased time to complete (tylastic laces added to shoes and pt donned with use of AE-reacher, [facility], [facility])        Standing Balance  Activity: functional mob in dept between transfers, with RW and CGA. Cues for safely lining up walker to chairs, bed.   Wheelchair Bed Transfers  Wheelchair/Bed - Technique: Ambulating  Equipment Used: Bed; Wheelchair (low arm chair)  Level of Asssistance: Contact guard assistance; Minimal assistance  Wheelchair Transfers Comments: RW, consistent cues for safe hand placement  Bed mobility  Supine to Sit: Contact guard assistance (cues to roll back onto R side. Effortful using UE's to push up.)  Sit to Supine: Contact guard assistance (on std bed in OT dept. Pt cued to lay onto R side and bring LLE up onto bed at same time.)  Scooting: Contact guard assistance (used footstool under feet at EOB to scoot.)         Cognition  Overall Cognitive Status: Exceptions  Memory: Decreased short term memory  Problem Solving: Assistance required to generate solutions; Assistance required to implement solutions; Decreased awareness of errors  Initiation: Requires cues for some      Type of ROM/Therapeutic Exercise  Comment: BUE -hand gripper ex, x20 reps, 10 resp with 2 red bands                  Second session: Pt met BS, sitting up in wheelchair. Pt agreeable to OT. Noted diet change in chart and per Speech Therapy: Minced and moist and nectar thick liquids. Transfers- CG/Min A cues for hand placement, at w/c, commode in her BR, and simulating walk in shower transfer (with pt backing up to chair, sitting first). Pt reports having a shower door that opens to the R side. Transfer at car simulator seat with CGA cues for hand placement and CGA for LE's in/out of car, increased time to complete. Functional mob with RW, pt ambulating short distance with CGA, cues for safely lining up to surfaces. ADL's- Pt voided urine, BM. Assist to manage clothes back up over hips. Pt able to complete hygiene in stance with CG/SBA. Pt stood at sink to wash hands with light CG/SBA. Pt remained in w/c at end of tx. Call button near and chair alarm on. Pt tolerated session fairly well, limited by decreased activity tolerance, endurance and cognition. Pt also coughing at times (at beginning of tx and later, after taking meds. RN in to see pt).              Plan   Plan  Times per week: 5-6  Times per day: Twice a day  Plan weeks: 1.5-2  Current Treatment Recommendations: Strengthening, Functional Mobility Training, Gait Training, Endurance Training, Balance Training, ROM, Self-Care / ADL, Patient/Caregiver Education & Training, Safety Education & Training    Goals  Short term goals  Time Frame for Short term goals: 1 week  Short term goal 1: Pt will complete ADL transfers w/ SBA  Short term goal 2: Pt will toilet w/ min A  Short term goal 3: Pt will feed and groom mod I  Short term goal 4: Pt will bathe w/ min A using AE prn  Short term goal 5: Pt will dress w/ min A using AE prn  Long term goals  Time Frame for Long term goals : 10-14 days  Long term goal 1: Pt will complete ADL transfers mod I  Long term goal 2: Pt will toilet mod I  Long term goal 3: Pt will bathe mod I  Long term goal 4: Pt will dress mod I (may need assist with TEDs)  Long term goal 5: Pt will complete microwave meal prep task mod I  Patient Goals   Patient goals : \"be able to get around, get my own shower, make a simple breakfast\"       Therapy Time   Individual Concurrent Group Co-treatment   Time In 1015         Time Out 1100         Minutes 45               Therapy Time     Individual Co-treatment   Time In 1310     Time Out 1355     Minutes 45             Carlos DOWD/АННА,[id number]

## 2021-11-16 NOTE — PATIENT CARE CONFERENCE
[facility]  Inpatient Rehabilitation  Weekly Team Conference Note      Date: 2021  Patient Name:  Erica Penn    MRN: [id number]  : 1950  Gender: female  Physician: Dr Giselle Haji  Diagnosis: Stenosis of cervical spine with myelopathy ([facility] [M48.02, G99.2]    CASE MANAGEMENT  Assessment:   Goal is home and agreeable to home care      PHYSICAL THERAPY    Bed mobility  Bridging: Minimal assistance (small lift)  Rolling to Left: Contact guard assistance, Minimal assistance  Rolling to Right: Contact guard assistance, Minimal assistance  Supine to Sit: Stand by assistance (increased time to lift trunk, but able to bring bilateral LE over Edge of bed without assistance)  Sit to Supine: Minimal assistance (with cueing for log roll. Difficulty lifting bilateral LE into bed and required assistance)  Scooting: Stand by assistance  Comment: bed mobility done on regular bed in ADL apartment with bed rail. PT placed foot stool under feet to allow for patient to scoot back onto bed further. Transfers:  Sit to Stand: Stand by assistance (improved hand placement without cueing)  Stand to sit: Stand by assistance (improved hand placement without cueing)  Bed to Chair: Stand by assistance (with wheeled walker and increased time)    Ambulation 1  Surface: level tile  Device: Rolling Walker  Assistance: Contact guard assistance  Quality of Gait: step to pattern leading with right LE - left foot inverted - small step length with reduced bilateral foot clearance but improved with shoes on  Gait Deviations: Slow Patsy, Decreased step length, Decreased step height  Distance: short distance in therapy gym, 54' x2 with two turns each  Comments: Patient with no complaints of SOB    Stairs  # Steps : 12  Stairs Height: 6\"  Rails: Right ascending  Device: No Device  Assistance: Contact guard assistance  Comment: PT educated patient on use of only one railing.  Patient was able to complete 12 steps facing the railing with bilateral UE on the railing. She used a non-reciprocal pattern with CGA. Patient used slow gilbert, especially with descending as she had to take extra time to clear sole of shoe of left foot as she brought it down each step. Curbs: 6\" (PT demonstrated curb step for the patient. She has high anxiety on the curb. She required mod assist to manage the walker but was CGA for balance.)    Car Transfer: Contact guard assistance (per DOWD \"Transfer at car simulator seat with CGA cues for hand placement and CGA for LE's in/out of car, increased time to complete. \")      Assessment: Ms. Lei Arredondo continues to demonstrate improved strength with limited endurance. She consistently ambulating household distance with CGA, but as fatigue increases patient has poor clearance of bilateral LE. Overall, she demonstrated improved bilateral foot clearance with shoes on from home. Patient was also able to ascend and descend 12 steps with right rail ascending with CGA, but does have fatigue requiring seated rest break afterwards. Patient continues to be below baseline and will benefit from continued skilled therapy for strengthening, gait training, and functional mobility training to allow for return home. SPEECH THERAPY (intentionally left blank if not actively being seen by this service):  Assessment / Progress: Pt was seen for a Clinical Evaluation of Swallowing due to pt complaints of problems post surgery (anterior approach to cervical spine surgery). CHRISTIAN documented findings revealed: Dysphagia Diagnosis: Moderate Oropharyngeal Dysphagia  characterized by reduced mastication ( which is further impacted by loose upper denture); reduced lingual coordination for bolus control/bolus formation and A-P oral transit; delayed initiation of swallow and concern for reduced laryngeal elevation and reduced pharyngeal clearance.    Pt with overt clinical s/s with concern for pharyngeal pooling and aspiration with thin liquids; textured soft food consistencies/meats. Pt appeared to better tolerate mildly thick liquids; moderately thick liquids; puree; and minced and moist food consistencies as evidenced by no complaints and no overt clinical s/s post swallow. Plan to downgrade diet to mildly thick liquids with minced and moist food consistencies. Will discuss with MD regarding MBSS to further assess if s/s persist.    Will continue therapy on rehab. Anticipate MBSS if s/s persist.  Jenna Mcnulty,MS,CCC,SLP [id number]  Speech and Language Pathologist      OCCUPATIONAL THERAPY    ADL  Feeding: Setup, Increased time to complete (Pt's food was pre-cut; while she was eating had a coughing fit lasting ~30 seconds, reports at home she normally eats soft food with no problem. OT sat pt up more forward in the chair which seemed to help, notified RN)  Grooming: Setup, Stand by assistance, Increased time to complete  UE Bathing: Setup, Minimal assistance (seated on shower chair, OT cleaned/dried surgical site with dial soap)  LE Bathing: Minimal assistance, Increased time to complete, Setup (Pt used LH sponge to reach lower LEs, required assist to dry. Stood w/ CGA using GBs while OT bathed posterior periarea)  UE Dressing: Setup (pullover shirt)  LE Dressing: Minimal assistance, Moderate assistance, Verbal cueing, Increased time to complete (tylastic laces added to shoes and pt donned with use of AE-reacher, [facility], [facility])  Toileting: Minimal assistance (voided urine, BM. Assist to manage clothes back up over hips. Pt able to complete hygiene in stance with CG/SBA.)  Additional Comments: Pt completed shower from shower chair w/ towel on floor for non-skid surface. IV covered and kept dry. Pt doffed hearing aids prior to shower and donned following shower, however toward the end of the hearing aids stopped working - OT assisted to change the batteries which did not help.  Called pt's sister to let her know, she reports she will come in today to help    Bed mobility  Bridging: Minimal assistance (small lift)  Rolling to Left: Contact guard assistance, Minimal assistance  Rolling to Right: Contact guard assistance, Minimal assistance  Supine to Sit: Stand by assistance (increased time to lift trunk, but able to bring bilateral LE over Edge of bed without assistance)  Sit to Supine: Minimal assistance (with cueing for log roll. Difficulty lifting bilateral LE into bed and required assistance)  Scooting: Stand by assistance  Comment: bed mobility done on regular bed in ADL apartment with bed rail. PT placed foot stool under feet to allow for patient to scoot back onto bed further. Functional Transfers: Toilet Transfers  Toilet - Technique: Ambulating  Equipment Used: Grab bars  Toilet Transfer: Contact guard assistance  Toilet Transfers Comments: RW, cues for safe hand placement. States she use a towel bar near commode at home for sit><stands. Cont to assess for equipment -may need toilet safety frames. Shower Transfers  Shower - Transfer From: [employer] - Transfer Type: To and From  Shower - Transfer To: Shower seat with back  Shower - Technique: Ambulating (RW)  Shower Transfers: Contact Guard  Shower Transfers Comments: cues for sit and swing technique, min A to scoot back on chair      Dry shower transfer: CG/Min A cues for hand placement, at w/c, commode in her BR, and simulating walk in shower transfer (with pt backing up to chair, sitting first). Pt reports having a shower door that opens to the R side. DME: has walk in shower in basement she uses, shower chair, hand held shower. Rec non skid mat & grab bar for safety. May benefit from TSF and walker basket or tray also. UE Function:grossly WFL, NT formally D/T surgical precautions      Assessment: Pt completing transfers, mob with RW with CGA, consistent cues for safety. Pt CGA cues for bed mob on std bed.  Pt donned shoes with tylastic laces addes, Min/Mod A and w/use of reacher, long handled shoe horn, increased time, cues for technique. Pt overall tolerating session fairly well. Cont poc to maximize function for return home. NUTRITION  Most recent weightWeight: 160 lb 15 oz (73 kg)  BMI (Calculated): 31.5   Diet Order: ADULT DIET; Dysphagia - Minced and Moist; Mildly Thick (Nectar)  PO Meals Eaten (%): 76 - 100%  Please see nutrition note for details. NURSING  Can be incontinent of bladder, usually continent of bowel. Monitor and maintain skin integrity, redness skin folds, incision care  Family Education: Patient Education: cerebral palsy, cervical precautions, skin and incision care, medications, pain control, bladder program, diet and swallowing precautions, safety and fall prevention. MEDICAL  MBS ordered to further eval dysphagia    TEAM SUMMARY AND DISCHARGE PLAN  Estimated Length of Stay: DC 11/25/2021  Destination: home with home care and support from NICK monroy   · Anticipated Services at Discharge:    [x] OT  [x] PT   [x] SLP    [x] RN   [] Home Health aide []   Community Resources: _______________________________  Equipment recommendations:  [] Hospital bed [] Tub bench  [] Shower chair [] Hand held shower  [] Raised toilet seat [x] ? Toilet safety frame, holds towel bar @ home [] Bedside commode   [] W/C: _____  [] Rolling Walker [] Standard walker [] Gait belt [] cane: _________  [] Sliding board [] Alternate seating/furniture [] O2 [x] Hip Kit: ___3 piece____  [] Life Line [] Other: _______  Factors facilitating achievement of predicted outcomes: Family support, Motivated, Cooperative and Pleasant  Barriers to the achievement of predicted outcomes/Interventions:    decreased endurance strengthening, conditioning, activity pacing and energy conservation strategies, compensatory techniques for self care and mobility to promote energy efficiency       Interdisciplinary Individualized Plan of Care Review:    · Continue Current Plan of Care:  Yes    · Modifications:_____wean off TIGIST hosse________________________    Special Needs in the Upcoming Week :    [x] Family/Caregiver Education  [] Home visit  []Therapeutic Pass   [] Consults:_______    [] Other;_______    Patient Rehab Team Goals for the Upcoming Week:  1. Functional mobility with wheeled walker with modified independence. 2. Pt will perform ADL tasks supervision to mod ind across disciplines. 3. Pt will tolerate diet without complications or complaints  4. Patient goals : wants to get home again and be able to walk to make simple meal      Team Members Present at Conference:  Physician: Dr Phylicia Hernandez  : [facility]    Occupational Therapist: Pierre Hawley, OTR/L#[id number]  Physical Halle Sky, [id number]  Speech Therapist: Srinivas Singh. Pricila,MS,CCC,SLP [id number]  Nurse: Maryann Mondragon RN, CRRN  Dietitian: Dede Saba, RD, LD   Juju Jones     I led this team conference and I approve the established interdisciplinary plan of care as documented within the medical record of [facility]. MD: Janny Clarke.  Phylicia Hernandez MD 11/17/2021, 2:52 PM

## 2021-11-16 NOTE — PLAN OF CARE
Problem: Falls - Risk of:  Goal: Will remain free from falls  Description: Will remain free from falls  11/16/2021 0238 by Rupa Schilling RN  Outcome: Ongoing  Calls for assistance appropriately.      Problem: Falls - Risk of:  Goal: Absence of physical injury  Description: Absence of physical injury  11/16/2021 0238 by Rupa Schilling RN  Outcome: Ongoing     Problem: Skin Integrity:  Goal: Will show no infection signs and symptoms  Description: Will show no infection signs and symptoms  11/16/2021 0238 by Rupa Schilling RN  Outcome: Ongoing  11/15/2021 1401 by Abbie Kunz RN  Outcome: Ongoing     Problem: Skin Integrity:  Goal: Absence of new skin breakdown  Description: Absence of new skin breakdown  11/16/2021 0238 by Rupa Schilling RN  Outcome: Ongoing     Problem: Infection - Surgical Site:  Goal: Will show no infection signs and symptoms  Description: Will show no infection signs and symptoms  11/16/2021 0238 by Rupa Schilling RN  Outcome: Ongoing  Incision to the r side of the neck is cdi

## 2021-11-16 NOTE — PROGRESS NOTES
Speech Language Pathology  Facility/Department: [address] [facility]   CLINICAL BEDSIDE SWALLOW EVALUATION    NAME: Lesley Lovett  : 1950  MRN: [id number]    ADMISSION DATE: 2021  ADMITTING DIAGNOSIS: S/P anterior Cervical Discectomy with fusion and fixation of C4-C5 level   ·  Generalized weakness and Stenosis of cervical spine with myelopathy (HCC) on their problem list.  · Complaints of dysphagia  ·  has a past medical history of Depression, Hyperlipidemia, and Hypertension. · Baseline of some chewing problems but on a regular diet as desired  ONSET DATE: 11/10/2021      Date of Eval: 2021  Evaluating Therapist: Sebas Leal SLP      Chart Reviewed  MD History and Physical Documentation revealed:   History of Present Illness/Hospital Course:  53-year-old female patient with cerebral palsy with recent history of increasing debility, inability to walk, generalized weakness.  Patient states that she was not able to perform her ADLs, use her walker and her sister is not able to help her any longer as well.  Work-up revealed the patient had severe cervical spinal cord compression due to large disc herniation and osteophytes at the C4-5 level.  Patient was taken to surgery on 11/10 where she underwent anterior cervical discectomy with fusion and fixation of C4-5 per Dr. Shahriar Nunez started in therapies today. Reese Santillan lives at home with her sister in a two-level house.      2021 MRI Cervical Spine:   Impression   1. Severe canal stenosis with cord compression and rightward displacement of   the cervical cord at C4-5 related to large left subarticular disc protrusion   and osteophytic spurring with mild cord edema. 2. Mild right eccentric cord compression at C6-7 without abnormal underlying   cord signal.   Findings were discussed with Martín Serrano NP at 11:05 am on 2021.         2021 MRI Brain      Impression   1.  No acute cortical infarct or other acute intracranial process. 2. Moderate generalized parenchymal volume loss, chronic small vessel   ischemia and disproportionate enlargement of the supratentorial ventricles   with configuration suggesting central white matter volume loss. 3. Incidental, suspected tiny meningioma overlying the right temporal   convexity without mass effect on the underlying brain parenchyma or   underlying parenchymal signal abnormality.         11/7/2021: Chest XR  Impression   No airspace disease by radiograph. Current Diet level:  Current Diet : Dysphagia Soft and Bite-Sized (Dysphagia III)  Current Liquid Diet : Thin      Primary Complaint  Patient Complaint: pt reports problems some foods and some liquids new since surgery      Reason for Referral  Delfino Severin was referred for a bedside swallow evaluation to assess the efficiency of her swallow function, identify signs and symptoms of aspiration and make recommendations regarding safe dietary consistencies, effective compensatory strategies, and safe eating environment. Assessment Impression  1. Pt was awake in w/c upon SLP entry to room. Pt was oriented to self; recent surgery; place and partially to full date/time. Pt was verbally responsive with variable reduced intelligibility (pt reports no recent status changes). Pt was able to follow simple commands. Pt on room air. Pt with CP history. 2. Dysphagia Diagnosis: Moderate Oropharyngeal Dysphagia   characterized by reduced mastication ( which is further impacted by loose upper denture); reduced lingual coordination for bolus control/bolus formation and A-P oral transit; delayed initiation of swallow and concern for reduced laryngeal elevation and reduced pharyngeal clearance. ·  Pt with overt clinical s/s with concern for pharyngeal pooling and aspiration with thin liquids; textured soft food consistencies/meats.   · Pt appeared to better tolerate mildly thick liquids; moderately thick liquids; puree; and minced and moist Reviewed: Yes  Behavior/Cognition: Alert; Cooperative; Pleasant mood  Respiratory Status: Room air  Communication Observation: Functional (Has some pre-existing oral motor speech disturbance but does not impact intelligibility)  Follows Directions: Simple  Dentition: Dentures top; Some missing teeth  Prior Dysphagia History: variable chewing problems due to loose upper denture  Patient Positioning: Upright in chair    Consistencies Administered: Dysphagia Soft and Bite-Sized (Dysphagia III); Dysphagia Minced and Moist (Dysphagia II); Dysphagia Pureed (Dysphagia I); Honey - straw; Nectar - straw; Thin - cup; Thin - straw; Nectar - cup    Pain: denied    Vision/Hearing  Vision  Vision: Impaired  Vision Exceptions: Wears glasses at all times  Hearing  Hearing: Exceptions to [facility]  Hearing Exceptions: Hard of hearing/hearing concerns; Bilateral hearing aid    Oral Motor Deficits  Oral/Motor  Oral Motor: Exceptions to [facility]  Labial ROM:  (fairly symmetrical labial/buccal rom)  Labial Coordination: Reduced  Lingual Symmetry:  (deviation on protrusion/elevation; reduced coordination/rom of lateralization bilaterally)  Lingual Strength: Reduced  Lingual Sensation: Reduced  Lingual Coordination: Reduced  Velum:  (asym during phonation of /a/)  Gag:  (diminished to absent)   Vocal Quality: Weak  Volitional Cough: Weak  Volitional Swallow: Delayed    Oral Phase Dysfunction  Oral Phase  Oral Phase: Exceptions  Oral Phase Dysfunction  Impaired Mastication: Soft Solid  Decreased Anterior to Posterior Transit:  (disorganized)  Suspected Premature Bolus Loss:  (suspect across all consistencies)  Lingual/Palatal Residue: Soft solid     Indicators of Pharyngeal Phase Dysfunction   Pharyngeal Phase  Pharyngeal Phase: Exceptions  Indicators of Pharyngeal Phase Dysfunction  Delayed Swallow:  All  Decreased Laryngeal Elevation: All  Throat Clearing - Immediate:  (thin liquids; textured food/soft food consistencies)  Cough - Immediate:  (thin liquids and inconsistently post swallow of soft food consistencies)  Pharyngeal Phase   Pharyngeal: Pt with complaints of gets caught pointing to lower neck    Prognosis  Prognosis  Prognosis for safe diet advancement: good (guarded post anterior approach cervical spine surgery)  Individuals consulted  Consulted and agree with results and recommendations: Patient; RN    Education  Patient Education Response: Verbalizes understanding; Needs reinforcement  Safety Devices in place: Yes          Therapy Time  SLP Individual Minutes  Time In: 2837  Time Out: Taco DONIS Hernandez 94  Minutes: 37          Signed  Jenna Mcnulty,MS,CCC,SLP [id number]  Speech and Language Pathologist  11/16/2021 12:47 PM

## 2021-11-16 NOTE — PROGRESS NOTES
No further incontinence since am. Has denied pain. Sliding down in chair reviewed safety and swallowing practices, cues for slow down with meal and take extra swallow.

## 2021-11-16 NOTE — PROGRESS NOTES
Physical Therapy  Facility/Department: [address] [facility]  Daily Treatment Note  NAME: Jonah Whitmore  : 1950  MRN: [id number]    Date of Service: 2021    Discharge Recommendations:  Continue to assess pending progress, Patient would benefit from continued therapy after discharge   PT Equipment Recommendations  Equipment Needed: No    Assessment   Body structures, Functions, Activity limitations: Decreased functional mobility ; Decreased strength; Decreased safe awareness; Decreased endurance; Decreased balance; Decreased posture  Assessment: Ms. Evan Olmstead continues to demonstrate improved strength with limited endurance. She consistently ambulating household distance with CGA, but as fatigue increases patient has poor clearance of bilateral LE. Overall, she demonstrated improved bilateral foot clearance with shoes on from home. Patient was also able to ascend and descend 12 steps with right rail ascending with CGA, but does have fatigue requiring seated rest break afterwards. Patient continues to be below baseline and will benefit from continued skilled therapy for strengthening, gait training, and functional mobility training to allow for return home. Treatment Diagnosis: impaired mobility  Specific instructions for Next Treatment: progress gait  Prognosis: Fair  Decision Making: Medium Complexity  PT Education: PT Role; Plan of Care; General Safety; Functional Mobility Training; Transfer Training; Gait Training; Precautions; Adaptive Device Training; Energy Conservation  REQUIRES PT FOLLOW UP: Yes  Activity Tolerance  Activity Tolerance: Patient Tolerated treatment well; Patient limited by endurance     Patient Diagnosis(es): There were no encounter diagnoses. has a past medical history of Depression, Hyperlipidemia, and Hypertension. has a past surgical history that includes cervical fusion (N/A, 11/10/2021).     Social/Functional History  Lives With:  (sister and NICK)  Type of Home: to large disc herniation and osteophytes at the C4-5 level. Patient was taken to surgery on 11/10 where she underwent anterior cervical discectomy with fusion and fixation of C4-5 per Dr. Caitlin Almazan. Response To Previous Treatment: Patient with no complaints from previous session. Family / Caregiver Present: No  Referring Practitioner: Dr. Salazar Zelaya: Patient with improved hearing this AM and states her sister helped her clean out her hearing aides last evening. No complaints of pain at this time  General Comment  Comments: Patient goes by Keysha Casey            Objective   Bed mobility  Supine to Sit: Stand by assistance (increased time to lift trunk, but able to bring bilateral LE over Edge of bed without assistance)  Sit to Supine: Minimal assistance (with cueing for log roll. Difficulty lifting bilateral LE into bed and required assistance)  Scooting: Stand by assistance  Comment: bed mobility done on regular bed in ADL apartment with bed rail. PT placed foot stool under feet to allow for patient to scoot back onto bed further.     Transfers  Sit to Stand: Contact guard assistance (improved hand placement without cueing)  Stand to sit: Contact guard assistance (improved hand placement without cueing)  Bed to Chair: Contact guard assistance (with wheeled walker and increased time)    Ambulation  Ambulation?: Yes  More Ambulation?: No  Ambulation 1  Surface: level tile  Device: Rolling Walker  Assistance: Contact guard assistance  Quality of Gait: step to pattern leading with right LE - left foot inverted - small step length with reduced bilateral foot clearance but improved with shoes on  Gait Deviations: Slow Patsy; Decreased step length; Decreased step height  Distance: short distance in therapy gym, 54' x2 with two turns each  Comments: Patient with no complaints of SOB    Stairs/Curb  Stairs?: Yes  Stairs  # Steps : 12  Stairs Height: 6\"  Rails: Right ascending  Device: No Device  Assistance: Contact guard assistance  Comment: PT educated patient on use of only one railing. Patient was able to complete 12 steps facing the railing with bilateral UE on the railing. She used a non-reciprocal pattern with CGA. Patient used slow gilbert, especially with descending as she had to take extra time to clear sole of shoe of left foot as she brought it down each step. Curbs: 6\" (PT demonstrated curb step for the patient. She has high anxiety on the curb. She required mod assist to manage the walker but was CGA for balance. )    PM Session  Patient arrived seated in wheelchair. She has no complaints of pain at this time. She discussed with PT that she is having trouble with swallowing. Patient had speech swallow assessment and is now on minced and moist diet with nectar thick liquids. PT planned for mock car transfer but patient states she completed with OT earlier this Afternoon. Please refer to NOEMÍ's note for details. Sit<>stand with CGA-SBA  Patient ambulated 54' with wheeled walker with CGA-SBA. Sit<>Stand with CGA-SBA  Ambulated approx 8' to/from NuStep with wheeled walker with SBA  PT assisted with set up on NuStep. Patient required step stool be used to scoot her back into the seat of the NuStep. PT stressed for patient to use legs mostly and limit UE use. Patient completed 10 minutes with resistance at 2, seat at 3, bilateral UE at 6, and average SPM at 43. Patient reporting no pain with activity, but did require seated rest break afterwards secondary to fatigue. Patient performed 15 reps bilateral LE ther ex while seated in wheelchair: APs, LAQ one leg at a time with added hamstring curl following each rep, marching, hip add sets and hip abduction with light resistance band. Patient with improving balance and activity tolerance. She is clearing bilateral feet with ambulation though she does need cueing at times to do this.     Safety Device - Type of devices:  [x]  All fall risk precautions in place [] Bed alarm in place  [] Call light within reach [] Chair alarm in place [] Positioning belt [x] Gait belt [] Patient at risk for falls [] Left in bed [x] Left in chair (in wheelchair to return to room with transportation) [] Telesitter in use [] Sitter present [] Nurse notified []  None               Goals  Short term goals  Time Frame for Short term goals: 1 week  Short term goal 1: bed mobility at minimal assist  Short term goal 2: transfers at [facility]  Short term goal 3: ambulation 48' with wheeled walker with CGA - met 11/16  Short term goal 4: perform curb step ascen/descend with CGA/minimal assist  Short term goal 5: perform 8 steps with CGA with rail - met 11/16  Long term goals  Time Frame for Long term goals : 1,5 to 2 weeks  Long term goal 1: transfer with MI  Long term goal 2: bed mobility with MI  Long term goal 3: Patient ambulate 80' with MI  Long term goal 4: Perform 12 steps with rail and SBA  Long term goal 5: perform curb step with SBA  Patient Goals   Patient goals : wants to get home again and be able to walk to make simple meal    Plan    Plan  Times per week: 5-6 x/week  Times per day: Twice a day  Specific instructions for Next Treatment: progress gait  Current Treatment Recommendations: Strengthening, ADL/Self-care Training, ROM, Functional Mobility Training, Transfer Training, Gait Training, Patient/Caregiver Education & Training, Safety Education & Training, Positioning, Balance Training, Endurance Training, Stair training, Home Exercise Program, Equipment Evaluation, Education, & procurement  Safety Devices  Type of devices:  All fall risk precautions in place, Gait belt, Left in chair (in wheelchair to return to room with transportation)  Restraints  Initially in place: No     Therapy Time   Individual Concurrent Group Co-treatment   Time In 1115         Time Out 1200         Minutes 39                Second Session Therapy Time     Individual Co-treatment   Time In 1430     Time Out [address] [facility], [id number]

## 2021-11-16 NOTE — PROGRESS NOTES
Department of Physical Medicine & Rehabilitation  Progress Note    Patient Identification:  Tita Green  [id number]  : 1950  Admit date: 2021    Chief Complaint: Stenosis of cervical spine with myelopathy ([address]    Subjective:   No acute events overnight. Patient seen this afternoon sitting up in gym. She was seen by SLP and diet has been downgraded due to dysphagia. Provided some education about this. She denies any new concerns. Labs reviewed. ROS: No f/c, n/v, cp     Objective:  Patient Vitals for the past 24 hrs:   BP Temp Temp src Pulse Resp SpO2 Weight   21 1515 126/81 98.2 °F (36.8 °C) Oral 82 18 96 % --   21 1017 -- -- -- -- -- 96 % --   21 0515 121/73 97.9 °F (36.6 °C) Oral 74 16 97 % 160 lb 15 oz (73 kg)   11/15/21 2115 133/64 98.1 °F (36.7 °C) Oral 84 18 96 % --     Const: Alert. No distress, pleasant. HEENT: Normocephalic, atraumatic. Normal sclera/conjunctiva. MMM. +Strabismus. CV: Regular rate and rhythm. Resp: No respiratory distress. Lungs CTAB. Abd: Soft, nontender, nondistended, NABS+   Ext: No edema. Neuro: Alert, oriented. Left hemiparesis. Psych: Cooperative, appropriate mood and affect    Laboratory data: Available via EMR. Last 24 hour lab  No results found for this or any previous visit (from the past 24 hour(s)). Therapy progress:  PT  Position Activity Restriction  Other position/activity restrictions: ACDF precautions (no excessive cervical motion), Hx of CP;  21: Diet: Dysphagia-minced and moist; liquids: mildly thick (nectar).   Objective     Sit to Stand: Contact guard assistance (improved hand placement without cueing)  Stand to sit: Contact guard assistance (improved hand placement without cueing)  Bed to Chair: Contact guard assistance (with wheeled walker and increased time)  Device: [address]: Contact guard assistance  Distance: short distance in therapy gym, 54' x2 with two turns each  OT  PT Equipment

## 2021-11-17 PROCEDURE — 97110 THERAPEUTIC EXERCISES: CPT

## 2021-11-17 PROCEDURE — 1280000000 HC REHAB R&B

## 2021-11-17 PROCEDURE — 2500000003 HC RX 250 WO HCPCS: Performed by: PHYSICAL MEDICINE & REHABILITATION

## 2021-11-17 PROCEDURE — 6370000000 HC RX 637 (ALT 250 FOR IP): Performed by: PHYSICAL MEDICINE & REHABILITATION

## 2021-11-17 PROCEDURE — 97535 SELF CARE MNGMENT TRAINING: CPT

## 2021-11-17 PROCEDURE — 97116 GAIT TRAINING THERAPY: CPT

## 2021-11-17 PROCEDURE — 2580000003 HC RX 258: Performed by: PHYSICAL MEDICINE & REHABILITATION

## 2021-11-17 PROCEDURE — 97129 THER IVNTJ 1ST 15 MIN: CPT

## 2021-11-17 PROCEDURE — 97530 THERAPEUTIC ACTIVITIES: CPT

## 2021-11-17 PROCEDURE — 92526 ORAL FUNCTION THERAPY: CPT

## 2021-11-17 RX ADMIN — METHOCARBAMOL TABLETS 750 MG: 750 TABLET, COATED ORAL at 08:40

## 2021-11-17 RX ADMIN — METHOCARBAMOL TABLETS 750 MG: 750 TABLET, COATED ORAL at 14:05

## 2021-11-17 RX ADMIN — SODIUM CHLORIDE, PRESERVATIVE FREE 10 ML: 5 INJECTION INTRAVENOUS at 20:20

## 2021-11-17 RX ADMIN — RISPERIDONE 1 MG: 1 TABLET ORAL at 08:40

## 2021-11-17 RX ADMIN — OXYBUTYNIN CHLORIDE 10 MG: 5 TABLET ORAL at 20:20

## 2021-11-17 RX ADMIN — DULOXETINE HYDROCHLORIDE 60 MG: 60 CAPSULE, DELAYED RELEASE ORAL at 08:40

## 2021-11-17 RX ADMIN — RISPERIDONE 1 MG: 1 TABLET ORAL at 20:20

## 2021-11-17 RX ADMIN — SODIUM CHLORIDE, PRESERVATIVE FREE 10 ML: 5 INJECTION INTRAVENOUS at 08:41

## 2021-11-17 RX ADMIN — METHOCARBAMOL TABLETS 750 MG: 750 TABLET, COATED ORAL at 20:19

## 2021-11-17 RX ADMIN — CITALOPRAM HYDROBROMIDE 20 MG: 20 TABLET ORAL at 08:40

## 2021-11-17 RX ADMIN — MICONAZOLE NITRATE: 2 POWDER TOPICAL at 20:20

## 2021-11-17 RX ADMIN — MICONAZOLE NITRATE: 2 POWDER TOPICAL at 14:05

## 2021-11-17 RX ADMIN — FENOFIBRATE 160 MG: 160 TABLET ORAL at 08:40

## 2021-11-17 ASSESSMENT — PAIN SCALES - GENERAL
PAINLEVEL_OUTOF10: 3
PAINLEVEL_OUTOF10: 0
PAINLEVEL_OUTOF10: 0

## 2021-11-17 ASSESSMENT — PAIN DESCRIPTION - LOCATION: LOCATION: NECK

## 2021-11-17 ASSESSMENT — PAIN DESCRIPTION - FREQUENCY: FREQUENCY: INTERMITTENT

## 2021-11-17 ASSESSMENT — PAIN DESCRIPTION - PAIN TYPE: TYPE: ACUTE PAIN

## 2021-11-17 ASSESSMENT — PAIN DESCRIPTION - ORIENTATION: ORIENTATION: RIGHT;ANTERIOR

## 2021-11-17 ASSESSMENT — PAIN DESCRIPTION - ONSET: ONSET: ON-GOING

## 2021-11-17 ASSESSMENT — PAIN DESCRIPTION - DESCRIPTORS: DESCRIPTORS: DISCOMFORT

## 2021-11-17 NOTE — PROGRESS NOTES
Occupational Therapy  Facility/Department: [address] IP REHAB  Daily Treatment Note  NAME: Andriy Ramsey  : 1950  MRN: [id number]    Date of Service: 2021    Discharge Recommendations:  Home with assist PRN, Patient would benefit from continued therapy after discharge  OT Equipment Recommendations  Equipment Needed: Yes  ADL Assistive Devices: Reacher; Sock-Aid Hard; Long-handled Shoe Horn; Toilet Safety Frame    Assessment   Performance deficits / Impairments: Decreased functional mobility ; Decreased strength; Decreased ADL status; Decreased safe awareness; Decreased endurance; Decreased balance; Decreased high-level IADLs  Assessment: Pt tolerated session well, but did require extended time to complete tasks. She completed showering with min A overall and use of lg handled sponge. She completed dressing tasks with mod A overall, mainly for donning sabrina hose. Pt does utilize reacher, lg handled shoe horn and sock aid for LB dressing. Pt completed ADL transfers with RW and CGA-SBA. She required CGA while ambulating with RW. Pt is progressing but continues to present below her baseline and would benefit from cont'd OT on ARU, per POC. Treatment Diagnosis: decreased: ADLs, fxl transfers/mobility, IADLs  History: 74 yo F w/ PMHx CP who lives in a 2 story home w/ her sister and NICK. House has 1 DAKOTA to enter and 1 flight of steps to pt's bedroom and full bathroom. Pt has been sleeping in a recliner chair on main level, which only has a half bath. She was previously independent, her sister works during the day. Pt has RW, w/c, shower chair, and GBs  OT Education: Plan of Care; OT Role; Transfer Training; ADL Adaptive Strategies; Precautions; Energy Conservation; Equipment  Patient Education: ed on not taking first few showers [facility] present for safety concerns.   REQUIRES OT FOLLOW UP: Yes  Activity Tolerance  Activity Tolerance: Patient Tolerated treatment well  Activity Tolerance: Pt required extended time to complete some ADL tasks  Safety Devices  Type of devices: Gait belt; Patient at risk for falls; Chair alarm in place; Call light within reach; Left in chair; Nurse notified         Patient Diagnosis(es): There were no encounter diagnoses. has a past medical history of Depression, Hyperlipidemia, and Hypertension. has a past surgical history that includes cervical fusion (N/A, 11/10/2021). Restrictions  Restrictions/Precautions  Restrictions/Precautions: Fall Risk  Position Activity Restriction  Other position/activity restrictions: ACDF precautions (no excessive cervical motion), Hx of CP;  11-16-21: Diet: Dysphagia-minced and moist; liquids: mildly thick (nectar). Subjective   General  Chart Reviewed: Yes, Progress Notes, Orders  Patient assessed for rehabilitation services?: Yes  Additional Pertinent Hx: per H&P: \"73year-old female patient with cerebral palsy with recent history of increasing debility, inability to walk, generalized weakness. Patient states that she was not able to perform her ADLs, use her walker and her sister is not able to help her any longer as well. Work-up revealed the patient had severe cervical spinal cord compression due to large disc herniation and osteophytes at the C4-5 level. Patient was taken to surgery on 11/10 where she underwent anterior cervical discectomy with fusion and fixation of C4-5 per Dr. Dejon Clark. Patient started in therapies today. Patient lives at home with her sister in a two-level house. \"  Family / Caregiver Present: No  Referring Practitioner: Jose  Diagnosis: ANTERIOR CERVICAL DISCECTOMY WITH FUSION, WITH INTERBODY IMPLANT AND WITH PLATE AND SCREW FIXATION C4-5 (N/A Spine Cervical)  Subjective  Subjective: Pt met supine in bed. Pt denies pain. Pt agreeable to ADL shower/dressing session.            Objective    ADL  Equipment Provided: Long-handled shoe horn; Reacher; Long-handled sponge; Sock aid  Feeding: Minimal assistance (min A to open small containers)  Grooming: Setup; Increased time to complete (oral care and brushing hair while seated at sink)  UE Bathing: Setup; Verbal cueing; Increased time to complete (v/c for use of Dial soap on UB D/T incision, with IV covered & kept dry(right))  LE Bathing: Minimal assistance (us lg handled sponge for washing feet; min A to dry feet; min A for thoroughness while in stance for edward hygeiene w. use of grab bars)  UE Dressing: Setup (while seated ; increased time to don bra while seated)  LE Dressing: Minimal assistance (min A to thread briefs/pants while seated in w/c; min A to pull up to waist in stance with RW;  mod A to don sabrina hose; min A to don shoes w. use of shoe horn)  Toileting: Contact guard assistance (following void of urine; CGA to doff pants/briefs (did not re-don d/t shower); use of L grab bar)  Additional Comments: Utilized hand held shower well; use of Dial soap per neck incision protocol. Pt completed shower on shower chair with back. Completed UB dressing on shower chair, LB dressing from w/c.ed on not taking first few showers without HH OT, sister, or home health aid present  d/t safety concerns. Pt was agreeable. Standing Balance  Time: ~1 min 2x  Activity: LB bathing/dressing  Comment: CGA with use of RW while drying buttocks following shower/dressing LB  Functional Mobility  Functional - Mobility Device: Rolling Walker  Activity: To/from bathroom  Assist Level: Contact guard assistance  Functional Mobility Comments: pt from EOB in room to bathroom with use of RW and CGA; from bathroom to room in w/c d/t decreased from ext. time required during some ADL tasks. Toilet Transfers  Toilet - Technique: Ambulating  Equipment Used: Grab bars  Toilet Transfer: Contact guard assistance  Toilet Transfers Comments: CGA from standing at [employer] >< sitting on toilet  Shower Transfers  Shower - Transfer From: [facility] - Transfer Type: To and From  Shower - Transfer To:  Shower seat with back  Shower - Technique: Ambulating  Shower Transfers: Contact Guard  Shower Transfers Comments: CGA from standing at [employer] ><sitting on shower chair; min A to place feet on foot stool for LB support d/t shower chair being too tall (on lowest setting) for pt  Wheelchair Bed Transfers  Wheelchair/Bed - Technique: Ambulating  Equipment Used: Wheelchair  Level of Asssistance: Stand by assistance  Wheelchair Transfers Comments: from standing at RW to sitting in w/c with SBA  Bed mobility  Rolling to Left: Stand by assistance (with use of bed rails)  Supine to Sit: Stand by assistance (with use of bed rails)                                                                    Plan   Plan  Times per week: 5-6  Times per day: Twice a day  Plan weeks: 1.5-2  Current Treatment Recommendations: Strengthening, Functional Mobility Training, Gait Training, Endurance Training, Balance Training, ROM, Self-Care / ADL, Patient/Caregiver Education & Training, Safety Education & Training               Goals  Short term goals  Time Frame for Short term goals: 1 week  Short term goal 1: Pt will complete ADL transfers w/ SBA  Short term goal 2: Pt will toilet w/ min A  Short term goal 3: Pt will feed and groom mod I  Short term goal 4: Pt will bathe w/ min A using AE prn  Short term goal 5: Pt will dress w/ min A using AE prn  Long term goals  Time Frame for Long term goals : 10-14 days  Long term goal 1: Pt will complete ADL transfers mod I  Long term goal 2: Pt will toilet mod I  Long term goal 3: Pt will bathe mod I  Long term goal 4: Pt will dress mod I (may need assist with TEDs)  Long term goal 5: Pt will complete microwave meal prep task mod I  Patient Goals   Patient goals : \"be able to get around, get my own shower, make a simple breakfast\"       Therapy Time   Individual Concurrent Group Co-treatment   Time In 0715         Time Out 0815         Minutes 60         Timed Code Treatment Minutes: [facility],

## 2021-11-17 NOTE — PLAN OF CARE
Problem: Falls - Risk of:  Goal: Will remain free from falls  Description: Will remain free from falls  11/17/2021 1150 by Digna Chávez RN  Outcome: Ongoing  11/17/2021 0036 by Cassi Ashford RN  Outcome: Ongoing  Note: Fall risk assessment completed as charted. Pt is at risk for falls. Safety precautions in place. Call light and pt belongings in reach. Bed in low position. Bed/Chair Alarm on. Nonskid footwear on. All needs met. Pt instructed to call before getting up or out of bed. Pt verbalized understanding. ;;    Goal: Absence of physical injury  Description: Absence of physical injury  11/17/2021 1150 by Digna Chávez RN  Outcome: Ongoing  11/17/2021 0036 by Cassi Ashford RN  Outcome: Ongoing     Problem: Skin Integrity:  Goal: Will show no infection signs and symptoms  Description: Will show no infection signs and symptoms  11/17/2021 1150 by Digna Chávez RN  Outcome: Ongoing  11/17/2021 0036 by Cassi Ashford RN  Outcome: Ongoing  Note: Pt assessed for risk of skin breakdown. Encouraged pt to turn and reposition self while in bed. Skin clean and dry. No new skin breakdown noted. Heels floating. Will continue to assess skin condition each shift. Goal: Absence of new skin breakdown  Description: Absence of new skin breakdown  11/17/2021 1150 by Digna Chávez RN  Outcome: Ongoing  11/17/2021 0036 by Cassi Ashford RN  Outcome: Ongoing     Problem: Infection - Surgical Site:  Goal: Will show no infection signs and symptoms  Description: Will show no infection signs and symptoms  11/17/2021 1150 by Digna Chávez RN  Outcome: Ongoing  11/17/2021 0036 by Cassi Ashford RN  Outcome: Ongoing  Note: Pt assessed for risk of skin breakdown. Encouraged pt to turn and reposition self while in bed. Skin clean and dry. No new skin breakdown noted. Heels floating. Will continue to assess skin condition each shift.        Problem: ABCDS Injury Assessment  Goal: Absence of physical injury  11/17/2021 1150 by Digna Chávez RN  Outcome: Ongoing  11/17/2021 0036 by Cassi Ashford RN  Outcome: Ongoing     Problem: Pain:  Goal: Pain level will decrease  Description: Pain level will decrease  11/17/2021 1150 by Digna Chávez RN  Outcome: Ongoing  11/17/2021 0036 by Cassi Ashford RN  Outcome: Ongoing  Note: Pt assessed for pain this shift. Pt pain/discomfort is managed with PRN pain medications per md order. Pt able to verbalize pain by using numerical scale. Education provided and documented.      Goal: Control of acute pain  Description: Control of acute pain  11/17/2021 1150 by Digna Chávez RN  Outcome: Ongoing  11/17/2021 0036 by Cassi Ashford RN  Outcome: Ongoing     Problem: Daily Care:  Goal: Daily care needs are met  Description: Daily care needs are met  11/17/2021 1150 by Digna Chávez RN  Outcome: Ongoing  11/17/2021 0036 by Cassi Ashford RN  Outcome: Ongoing     Problem: Discharge Planning:  Goal: Patients continuum of care needs are met  Description: Patients continuum of care needs are met  11/17/2021 1150 by Digna Chávez RN  Outcome: Ongoing  11/17/2021 0036 by Cassi Ashford RN  Outcome: Ongoing     Problem: IP BLADDER/VOIDING  Goal: LTG - patient will achieve acceptable level of continence  11/17/2021 1150 by Digna Chávez RN  Outcome: Ongoing  11/17/2021 0036 by Cassi Ashford RN  Outcome: Ongoing  Goal: STG - Patient demonstrates no accidents  11/17/2021 1150 by Digna hCávez RN  Outcome: Ongoing  11/17/2021 0036 by Cassi Ashford RN  Outcome: Ongoing     Problem: IP SWALLOWING  Goal: LTG - Patient will demonstrate safe swallowing Intervention/techniques  11/17/2021 1150 by Digna Chávez RN  Outcome: Ongoing  11/17/2021 0036 by Cassi Ashford RN  Outcome: Ongoing  Goal: STG - Patient will follow recommended swallowing strategies  11/17/2021 1150 by Digna Chávez RN  Outcome: Ongoing  11/17/2021 0036 by Nadeem Jeffery, RN  Outcome: Ongoing

## 2021-11-17 NOTE — PROGRESS NOTES
Speech Language Pathology  ACUTE REHAB UNIT  SPEECH/LANGUAGE PATHOLOGY      [x] Daily  [] Weekly Care Conference Note  [] Discharge    Susanna Part      UXE:9/[birth year]  BYQ:[id number]  Rehab Dx/Hx: Stenosis of cervical spine with myelopathy ([facility] [address] [M48.02, G99.2]    Precautions: FAll risk; Post Cervical spine Surgery; Dysphagia; documented HX of CP  Home situation: Lives with family  ST Dx: [] Aphasia  [] Dysarthria  [] Apraxia   [] Oropharyngeal dysphagia [] Cognitive   Impairment  [] Other:   Initial Speech Therapy Dysphagia Assessment (CHRISTIAN)Diagnosis:   1. Dysphagia Diagnosis: Moderate Oropharyngeal Dysphagia  characterized by reduced mastication ( which is further impacted by loose upper denture); reduced lingual coordination for bolus control/bolus formation and A-P oral transit; delayed initiation of swallow and concern for reduced laryngeal elevation and reduced pharyngeal clearance. Pt with overt clinical s/s with concern for pharyngeal pooling and aspiration with thin liquids; textured soft food consistencies/meats. Pt appeared to better tolerate mildly thick liquids; moderately thick liquids; puree; and minced and moist food consistencies as evidenced by no complaints and no overt clinical s/s post swallow. Plan to downgrade diet to mildly thick liquids with minced and moist food consistencies. Will discuss with MD regarding MBSS to further assess if s/s persist.Discussed with pt and RN. Pt was agreeable to trial downgrade. Pt self reports some history of acid reflux/indigestion. Date of Admit: 11/12/2021  Room #: Q2W-2230/3262-01  Date: 11/17/2021       Current functional status (updated daily):         Current Diet Order:ADULT DIET; Dysphagia - Minced and Moist; Mildly Thick (Nectar)   Communication: []WFL  [] Aphasia  [] Dysarthria  [] Apraxia  [] Pragmatic Impairment [] Non-verbal  [] Hearing Loss  [x] Other: Limited assessment for CHRISTIAN.  Functional for expressing needs/wants; follow simple commands; oral motor speech disturbance which pt self reports is baseline. Cognition: [] WFL  [] Mild  [] Moderate  [] Severe [] Unable to Assess  [x] Other:Limited assessment for CHRISTIAN; concern for impulsivity and following compensatory swallow strategies/self monitoring  Memory: [] WFL  [] Mild  [] Moderate  [] Severe [] Unable to Assess  [x] Other: Limited assessment for Christian; concern for recall of new learning/working memory  Behavior: [x] Alert  [x] Cooperative  [x]  Pleasant  [] Confused  [] Agitated  [] Uncooperative  [x] Distractible [] Motivated  [] Self-Limiting [] Anxious  [] Other:  Endurance:  [x] Adequate for participation in SLP sessions  [] Reduced overall  [] Lethargic  [] Other:  Safety: [] No concerns at this time  [x] Reduced insight into deficits  []  Reduced safety awareness [] Not following call light procedures  [] Unable to Assess  [x] Other:self monitoring/carryover  Swallowing Precautions: Compensatory Swallowing Strategies: Upright as possible for all oral intake; Small bites/sips; Swallow 2 times per bite/sip;  Remain upright for 30-45 minutes after meals     Barriers toward progress: none unless medical issues; memory for new learning/self monitoring and caregiver support           Date: 11/17/2021      Tx session 1 Tx session 2   Total Timed Code Min SLP Individual Minutes  Time In: 8187  Time Out: 3230  Minutes: 40  Coded treatment time  10 n/a     Group Treatment Minutes 0 0   Co-Treat Minutes 0 0   Variance/Reason:  n/a    Pain denied    Pain Intervention [] RN notified  [] Repositioned  [] Intervention offered and patient declined  [] N/A  [] Other: [] RN notified  [] Repositioned  [] Intervention offered and patient declined  [] N/A  [] Other:   Subjective     Pt seen bedside  Pt was in w/c  Pt with voiced recognition of SLP from 11/16/2021  Lunch meal here for direct dysphagia f/u with diet consistencies and endurance for meal  Pt was agreeable to adhesive for upper denture Objective:  Goals     Dysphagia Goals: Pt goal is to eat wihtout choking or it getting stuck Therapy working toward pt goal. Overall improvement with diet downgrade    1. Pt will tolerate dysphagia minced and moist food consistencies with mild thick liquids with compensatory swallow strategies Minced and moist: 2 episode of coughing/choking when talking with food in mouth. Otherwise not s/s post swallow and pt self reporting doing better    Mildly thick: isolated sips tolerated without overt clinical s/s of aspiration. Pt had one episode of coughing/choking when taking a drink while food in mouth        2. Pt will demonstrate improved swallow response post tongue base retraction and laryngeal elevation ex and use of effortful swallow technique\" 10/10; Warm up for ed in dysphagia tx poc    3. Pt will tolerate trials of soft/moist food consistencies OR thin liquids 10/10 without voert clinical s/s of aspiration  Not targeted      4. The patient/caregiver will demonstrate understanding of compensatory strategies for improved swallowing safety.  Pt is impulsive and does need reminders for clearance swallow between presentations and to slow down when eating    Additional reminders to try not to talk when food in mouth    Review of CHRISTIAN recommendations and potential instrumental assessment  · After today's session ; ed provided to pt that SLP will requesting MD order for MBSS (procedure /rationale again reviewed)    Other areas targeted: Cognition:   Pt verbalizing need for small sips; but external cues for consistency    Pt impulsive with presentations that external cues for clearance swallow between presentations due to suspected oral residue pooling to pharynx OR potential pharyngeal pooling    Pt was most optimal in carryover with external cues    Education:       Safety Devices: [x] Call light within reach  [x] Chair alarm activated  [] Bed alarm activated  [] Other: [] Call light within reach  [] Chair alarm activated  [] Bed alarm activated  [] Other:    Progress Assessment: 11/17/2021: Overall appears improved as compared to 11/16/2021 and diet modification appears to be a more functional one for pt. However; pt with 2 epiosdes of coughin/choking and both occurred when distracted. Discussed with MD and recommend MBSS to furtehr assess pahrygneal phase of the swallow   Plan: Continue as per plan of care. Continued Tx Upon Discharge: ?    [x] Yes [] No [] TBD based on progress while on ARU [] Vital Stim indicated [] Other:   Estimated discharge date: TBD   Discharge recommendations:   [] Home independently  [x] Home with assistance []  24 hour supervision  [] ECF [] Other:     Additional information:     Interventions used during Rehab Stay:  [] Speech/Language Treatment  [] Instruction in HEP [] Group [x] Dysphagia Treatment [] Cognitive Treatment   [] Other:        Electronically Signed by    Hammad Bui. Fluroyer,MS,CCC,SLP [id number]  Speech and Language Pathologist

## 2021-11-17 NOTE — CARE COORDINATION
Team Conference held today. Team reviewed progress and goals. Team reports she is working in all three therapies. She is on mildly thickened liquids and minced and moist diet. She is working on endurance. Team recommends continued stay on rehab to further her progress in personal care, ambulation and speech needs. DC is planned for Thursday, 11- to home. DME recommendations:    TSF, 3 piece hip kit, walker tray. Home care orders for SN/PT/OT/SP/HHA. Met with pateint to review. She asked that I call her sister, Gilbert Francis, to give this update. Pt reports she already has a walker tray. Message left for sister, Gilbert Francis while still in pt's room. Asked for returned call to review progress.   Casie Boyce [place]     Case Management   [phone]    11/17/2021  4:32 PM

## 2021-11-17 NOTE — PROGRESS NOTES
Patient admitted to rehab with cervical fusion C4-5. A/Ox4. Transfers with GB and walker with CGA x1. Mobility restrictions: Lifting limit 8 lbs, no overhead lifting, limit 40% or less ROm in neck. On minced and moist, nectar thick liquid diet, tolerating well. Medications taken whole with thins. On TIGIST hose for DVT prophylaxis. Skin: right anterior neck incision closed with steri strips- EDDY, red/moist abd panus - micotin applied. Oxygen: on RA. LDA: IV right hand. Has been continent of bowel and incontinent at times of bladder. LBM 11/16. Chair/bed alarms in use and call light in reach. Will monitor for safety. Antifungal powder ordered for moist and red skin folds. Speech therapist advises encouragement for patient to use denture adhesive on upper dentures and plans to speak to Dr. Sen Griffin about ordering MBS.

## 2021-11-17 NOTE — PROGRESS NOTES
OCCUPATIONAL THERAPY  Progress Note   Second Session    Patient Name: [id number] Shell. Record Number: [id number]    Treatment Diagnosis: impaired mobility    General  Chart Reviewed: Yes, Progress Notes, Orders  Patient assessed for rehabilitation services?: Yes  Additional Pertinent Hx: per H&P: \"73year-old female patient with cerebral palsy with recent history of increasing debility, inability to walk, generalized weakness. Patient states that she was not able to perform her ADLs, use her walker and her sister is not able to help her any longer as well. Work-up revealed the patient had severe cervical spinal cord compression due to large disc herniation and osteophytes at the C4-5 level. Patient was taken to surgery on 11/10 where she underwent anterior cervical discectomy with fusion and fixation of C4-5 per Dr. Mingo Arreaga. Patient started in therapies today. Patient lives at home with her sister in a two-level house. \"  Family / Caregiver Present: No  Referring Practitioner: Heis  Diagnosis: ANTERIOR CERVICAL DISCECTOMY WITH FUSION, WITH INTERBODY IMPLANT AND WITH PLATE AND SCREW FIXATION C4-5 (N/A Spine Cervical)     Restrictions/Precautions  Restrictions/Precautions: Fall Risk        Position Activity Restriction  Other position/activity restrictions: ACDF precautions (no excessive cervical motion), Hx of CP;  11-16-21: Diet: Dysphagia-minced and moist; liquids: mildly thick (nectar). Subjective: Pt sitting in w/c upon arrival to room. Pt agreeable to therapy and when asked if she had to use the bathroom, stated \"yes\". Pt without complaints of pain. Objective:  Transfers SBA at w/c, commode in BR, arm chair, using RW and min cues for safe hand and body placement. ADL's-   Toileting:  pt noted to be soiled in brief of urine(not aware), when going to use BR. Pt voided urine in commode.  Pt needing Min A, cues to change briefs, doff/don pants and doff/don shoes with use of AE (reacher used only to don briefs; pt donned pants w/o AE). Pt with difficulty grasping reacher, shoe horn to use effectively. Pt also cued for problem solving w/use of reacher. Pt stood at sink with SBA to wash hands. Functional mob with RW, 13-15 ft at a time in room, with SBA and very min cues to stay w/walker when turning (tended to put to side w/transfers). Pt completed 15 reps for 4 BUe ex's and 10 reps for chair push up ex, to increase strength,endurance for functional tasks      Assessment: Pt with overall good tolerance for session. After initial cueing for safe hand placement with transfers, pt improved with carryover, self-correcting w/o cues.        Safety Device - Type of devices:  []  All fall risk precautions in place [] Bed alarm in place  [] Call light within reach [x] Chair alarm in place [] Positioning belt [x] Gait belt [] Patient at risk for falls [] Left in bed [x] Left in chair [] Telesitter in use [] Sitter present [] Nurse notified []  None        Therapy Time     Individual Co-treatment   Time In 1300     Time Out 1345     Minutes Marcelino. Tanisha [address]OWD/L,515

## 2021-11-17 NOTE — PROGRESS NOTES
Spoke to Dr. Odalys Green about red and moist skin in patient's abd fold, received order for micotin powder.

## 2021-11-17 NOTE — PLAN OF CARE
Problem: Falls - Risk of:  Goal: Will remain free from falls  Description: Will remain free from falls  Outcome: Ongoing  Note: Fall risk assessment completed as charted. Pt is at risk for falls. Safety precautions in place. Call light and pt belongings in reach. Bed in low position. Bed/Chair Alarm on. Nonskid footwear on. All needs met. Pt instructed to call before getting up or out of bed. Pt verbalized understanding. ;;       Problem: Skin Integrity:  Goal: Will show no infection signs and symptoms  Description: Will show no infection signs and symptoms  Outcome: Ongoing  Note: Pt assessed for risk of skin breakdown. Encouraged pt to turn and reposition self while in bed. Skin clean and dry. No new skin breakdown noted. Heels floating. Will continue to assess skin condition each shift. Problem: Pain:  Goal: Pain level will decrease  Description: Pain level will decrease  Outcome: Ongoing  Note: Pt assessed for pain this shift. Pt pain/discomfort is managed with PRN pain medications per md order. Pt able to verbalize pain by using numerical scale. Education provided and documented.

## 2021-11-17 NOTE — PROGRESS NOTES
Department of Physical Medicine & Rehabilitation  Progress Note    Patient Identification:  Eagle Barrera  [id number]  : 1950  Admit date: 2021    Chief Complaint: Stenosis of cervical spine with myelopathy ([address]    Subjective:   No acute events overnight. Patient seen this afternoon sitting up in gym. She reports fatigue from therapies. No new concerns. Still some coughing with solids. Labs reviewed. ROS: No f/c, n/v, cp     Objective:  Patient Vitals for the past 24 hrs:   BP Temp Temp src Pulse Resp SpO2 Weight   21 0444 (!) 106/57 97.2 °F (36.2 °C) Oral 82 17 92 % 160 lb 7.9 oz (72.8 kg)   21 1859 (!) 139/96 97.3 °F (36.3 °C) Oral 79 18 94 % --   21 1515 126/81 98.2 °F (36.8 °C) Oral 82 18 96 % --     Const: Alert. No distress, pleasant. HEENT: Normocephalic, atraumatic. Normal sclera/conjunctiva. MMM. +Strabismus. CV: Regular rate and rhythm. Resp: No respiratory distress. Lungs CTAB. Abd: Soft, nontender, nondistended, NABS+   Ext: No edema. Neuro: Alert, oriented. Left hemiparesis. Psych: Cooperative, appropriate mood and affect    Laboratory data: Available via EMR. Last 24 hour lab  No results found for this or any previous visit (from the past 24 hour(s)). Therapy progress:  PT  Position Activity Restriction  Other position/activity restrictions: ACDF precautions (no excessive cervical motion), Hx of CP;  21: Diet: Dysphagia-minced and moist; liquids: mildly thick (nectar).   Objective     Sit to Stand: Stand by assistance  Stand to sit: Stand by assistance  Bed to Chair: Stand by assistance (with wheeled walker)  Device: Rolling Walker  Assistance: Stand by assistance  Distance: 76' with multiple turns, 22' to and from stairs, 48' with 2 turns  OT  PT Equipment Recommendations  Equipment Needed: No  Toilet - Technique: Ambulating  Equipment Used: Grab bars  Toilet Transfers Comments: CGA from standing at RW >< sitting on toilet  Assessment SLP  Diet Solids Recommendation: Dysphagia Minced and Moist (Dysphagia II)  Liquid Consistency Recommendation: Mildly Thick (Nectar)    Body mass index is 31.34 kg/m². Assessment and Plan:    Cervical disc herniation with myelopathy  -s/p urgent C4-5 ACDF (11/10 with Dr. Radha John)  -Wound care  -PT/OT    Dysphagia   -Possibly due to ACDF/intubation  -SLP consulted  -Diet down graded to minced/moist + nectars (11/16)  -MBS ordered for 11/18    Cerebral palsy  -Baseline left hemiparesis  -PT/OT    HLD  -fenofibrate    Depression  -citalopram, duloxetine, risperidone    Bladder  -High risk retention  -Monitor PVRs, straight cath prn >300    Bowel  -High risk constipation  -senna+colace BID, prn miralax, MoM, bisacodyl supp    Pain control  -oxycodone prn, methocarbamol    DVT ppx  -None per Nsgy, SCDs    Rehab Progress: Interdisciplinary team conference was held today with entire rehab treatment team including PT, OT, SLP, Dietician, RN, and SW. Discussion focused on progress toward rehab goals and discharge planning. Making progress. Working on functional mobility, balance, compensatory strategies for ADLs, swallow. Separate conference then held with patient/family, questions answered and concerns addressed. Total treatment time >35 min with greater than 50% spent in care coordination. Anticipated Dispo: home with sister  Services: MARKIE PT, OT, SLP, RN  DME: [facility]  ELOS: 11/25      Omar Pike.  Aline Contreras MD 11/17/2021, 2:54 PM

## 2021-11-17 NOTE — PROGRESS NOTES
Physical Therapy  Facility/Department: [address] IP REHAB  Daily Treatment Note  NAME: Ashley Ashraf  : 1950  MRN: [id number]    Date of Service: 2021    Discharge Recommendations:  Continue to assess pending progress, Patient would benefit from continued therapy after discharge   PT Equipment Recommendations  Equipment Needed: No    Assessment   Body structures, Functions, Activity limitations: Decreased functional mobility ; Decreased strength; Decreased safe awareness; Decreased endurance; Decreased balance; Decreased posture  Assessment: Ms. Pb Sims continues to demonstrate improved strength and improving endurance. She is regularly increasing distance with wheeled walker and is now able to ambulate with SBA-supervision. She continues to demonstrate poor clearance of bilateral LE, especially as fatigue increases, but she responds well to verbal cueing. Overall, she demonstrated improved bilateral foot clearance with shoes on from home. Patient has increased difficulty on stairs as fatigue is increased, and she was only able to do 8 stairs this AM.  Patient continues to be below baseline and will benefit from continued skilled therapy for strengthening, gait training, and functional mobility training to allow for return home. Treatment Diagnosis: impaired mobility  Specific instructions for Next Treatment: progress gait  Prognosis: Fair  Decision Making: Medium Complexity  PT Education: PT Role; Plan of Care; General Safety; Functional Mobility Training; Transfer Training; Gait Training; Precautions; Adaptive Device Training; Energy Conservation  REQUIRES PT FOLLOW UP: Yes  Activity Tolerance  Activity Tolerance: Patient Tolerated treatment well; Patient limited by endurance     Patient Diagnosis(es): There were no encounter diagnoses. has a past medical history of Depression, Hyperlipidemia, and Hypertension.    has a past surgical history that includes cervical fusion (N/A, perform her ADLs, use her walker and her sister is not able to help her any longer as well. Work-up revealed the patient had severe cervical spinal cord compression due to large disc herniation and osteophytes at the C4-5 level. Patient was taken to surgery on 11/10 where she underwent anterior cervical discectomy with fusion and fixation of C4-5 per Dr. Jared Liz. Response To Previous Treatment: Patient with no complaints from previous session. Family / Caregiver Present: No  Referring Practitioner: Dr. Modesta Rachel: Patient reporting no complaints of pain at this time. She does report some fatigue from busy morning with full ADL session with OT already  General Comment  Comments: Patient goes by Nile [place]rieta            Objective      Transfers  Sit to Stand: Stand by assistance  Stand to sit: Stand by assistance  Bed to Chair: Stand by assistance (with wheeled walker)  Comment: Patient performed 5 reps sit<>stand to walker with SBA. Patient with poor hand placement with return to sit and slow transition to fully upright. She uses good hand placement when going to standing    Ambulation  Ambulation?: Yes  More Ambulation?: No  Ambulation 1  Surface: level tile  Device: Rolling Walker  Assistance: Stand by assistance  Quality of Gait: step to pattern leading with right LE - left foot inverted - small step length with reduced bilateral foot clearance but improved with shoes on  Gait Deviations: Slow Patsy; Decreased step length; Decreased step height  Distance: 76' with multiple turns, 25' to and from stairs, 48' with 2 turns  Comments: Patient with no complaints of SOB    Stairs/Curb  Stairs?: Yes  Stairs  # Steps : 8  Stairs Height: 6\"  Rails: Right ascending  Device: No Device  Assistance: Stand by assistance  Comment: patient with increased fatigue this AM and only able to complete 8 steps. She used just right rail ascending with non-reciprocal pattern.  She has increased difficulty leading with her left LE up, but was able to do so with increased time. She has difficulty positioning her feet on each step and needs to reposition the feet before ascending or descending the next step. Patient was fatigued after 8 steps and required seated rest break. Comment: ambulated about small area with wheeled walker and retrieved bean bags from the floor and placed them in bucket close by. Patient was supervision for this task, but did require cueing to ensure her body was positioned closely to the walker before attempting to reach to  the objects. PT reviewed recommendations to not attempt to stoop to the floor to retrieve anything and that she should be using a reacher at this time. Patient verbalized understanding, but stated she does not have a reach at home. She reports she would most likely ask someone else to retrieve things from the floor for her, or leave them until someone else got home. PM Session  Patient asking about patient care conference and PT updated patient with plans for discharge on Thursday, November 25th. Patient wanting her sister to be able to speak with the . PT provided the patient with phone number for [facility], case management, [phone]. Sit<>stand with SBA  Patient ambulated 72' with wheeled walker with SBA. Verbal cues to ensure foot clearance bilaterally. Sit>supine on regular bed in ADL apartment with use of railing with min assist and patient was unable to get bilateral LE into the bed on her own. Patient performed 15 reps bilateral LE ther ex while supine on therapy mat: APs, QS, hip add sets, hip abduction, heel slides, and 10 reps SLR with min assist to increase ROM. Supine>sit with use of grab bar and SBA. Difficult and required reminder to use the grab bar. Sit<>Stand with SBA  Ambulated 65' back to wheelchair with wheeled walker with SBA. Continues with reduced bilateral foot clearance.     Patient ascended/descended 4 steps with right rail ascending with CGA using non-reciprocal pattern. Patient fatiguing this afternoon and having increased difficulty clearing bilateral feet. Safety Device - Type of devices:  [x]  All fall risk precautions in place [] Bed alarm in place  [] Call light within reach [] Chair alarm in place [] Positioning belt [x] Gait belt [] Patient at risk for falls [] Left in bed [x] Left in chair (in wheelchair to return to room with transportation)   [] Telesitter in use [] Sitter present [] Nurse notified []  None              Goals  Short term goals  Time Frame for Short term goals: 1 week  Short term goal 1: bed mobility at minimal assist  Short term goal 2: transfers at [facility]  Short term goal 3: ambulation 48' with wheeled walker with CGA - met 11/16  Short term goal 4: perform curb step ascen/descend with CGA/minimal assist  Short term goal 5: perform 8 steps with CGA with rail - met 11/16  Long term goals  Time Frame for Long term goals : 1,5 to 2 weeks  Long term goal 1: transfer with MI  Long term goal 2: bed mobility with MI  Long term goal 3: Patient ambulate 80' with MI  Long term goal 4: Perform 12 steps with rail and SBA  Long term goal 5: perform curb step with SBA  Patient Goals   Patient goals : wants to get home again and be able to walk to make simple meal    Plan    Plan  Times per week: 5-6 x/week  Times per day: Twice a day  Specific instructions for Next Treatment: progress gait  Current Treatment Recommendations: Strengthening, ADL/Self-care Training, ROM, Functional Mobility Training, Transfer Training, Gait Training, Patient/Caregiver Education & Training, Safety Education & Training, Positioning, Balance Training, Endurance Training, Stair training, Home Exercise Program, Equipment Evaluation, Education, & procurement  Safety Devices  Type of devices:  All fall risk precautions in place, Gait belt, Left in chair (in wheelchair to return to room with transportation)  Restraints  Initially in place: No     Therapy Time   Individual Concurrent Group Co-treatment   Time In 0900         Time Out 0945         Minutes 45                Second Session Therapy Time     Individual Co-treatment   Time In 1430     Time Out 1515     Minutes [address], [id number]

## 2021-11-18 ENCOUNTER — APPOINTMENT (OUTPATIENT)
Dept: GENERAL RADIOLOGY | Age: 71
DRG: 560 | End: 2021-11-18
Attending: OBSTETRICS & GYNECOLOGY
Payer: MEDICARE

## 2021-11-18 LAB
ANION GAP SERPL CALCULATED.3IONS-SCNC: 11 MMOL/L (ref 3–16)
BASOPHILS ABSOLUTE: 0.1 K/UL (ref 0–0.2)
BASOPHILS RELATIVE PERCENT: 0.7 %
BUN BLDV-MCNC: 26 MG/DL (ref 7–20)
CALCIUM SERPL-MCNC: 9.7 MG/DL (ref 8.3–10.6)
CHLORIDE BLD-SCNC: 104 MMOL/L (ref 99–110)
CO2: 25 MMOL/L (ref 21–32)
CREAT SERPL-MCNC: 0.7 MG/DL (ref 0.6–1.2)
EOSINOPHILS ABSOLUTE: 0.2 K/UL (ref 0–0.6)
EOSINOPHILS RELATIVE PERCENT: 3.3 %
GFR AFRICAN AMERICAN: >60
GFR NON-AFRICAN AMERICAN: >60
GLUCOSE BLD-MCNC: 97 MG/DL (ref 70–99)
HCT VFR BLD CALC: 32.7 % (ref 36–48)
HEMOGLOBIN: 10.6 G/DL (ref 12–16)
LYMPHOCYTES ABSOLUTE: 2.7 K/UL (ref 1–5.1)
LYMPHOCYTES RELATIVE PERCENT: 37.4 %
MCH RBC QN AUTO: 29.5 PG (ref 26–34)
MCHC RBC AUTO-ENTMCNC: 32.5 G/DL (ref 31–36)
MCV RBC AUTO: 90.9 FL (ref 80–100)
MONOCYTES ABSOLUTE: 0.6 K/UL (ref 0–1.3)
MONOCYTES RELATIVE PERCENT: 8.6 %
NEUTROPHILS ABSOLUTE: 3.6 K/UL (ref 1.7–7.7)
NEUTROPHILS RELATIVE PERCENT: 50 %
PDW BLD-RTO: 13.4 % (ref 12.4–15.4)
PLATELET # BLD: 386 K/UL (ref 135–450)
PMV BLD AUTO: 7.3 FL (ref 5–10.5)
POTASSIUM REFLEX MAGNESIUM: 4 MMOL/L (ref 3.5–5.1)
RBC # BLD: 3.6 M/UL (ref 4–5.2)
SODIUM BLD-SCNC: 140 MMOL/L (ref 136–145)
WBC # BLD: 7.3 K/UL (ref 4–11)

## 2021-11-18 PROCEDURE — 74230 X-RAY XM SWLNG FUNCJ C+: CPT

## 2021-11-18 PROCEDURE — 97116 GAIT TRAINING THERAPY: CPT | Performed by: PHYSICAL THERAPIST

## 2021-11-18 PROCEDURE — 6370000000 HC RX 637 (ALT 250 FOR IP): Performed by: PHYSICAL MEDICINE & REHABILITATION

## 2021-11-18 PROCEDURE — 80048 BASIC METABOLIC PNL TOTAL CA: CPT

## 2021-11-18 PROCEDURE — 85025 COMPLETE CBC W/AUTO DIFF WBC: CPT

## 2021-11-18 PROCEDURE — 1280000000 HC REHAB R&B

## 2021-11-18 PROCEDURE — 97530 THERAPEUTIC ACTIVITIES: CPT

## 2021-11-18 PROCEDURE — 97530 THERAPEUTIC ACTIVITIES: CPT | Performed by: PHYSICAL THERAPIST

## 2021-11-18 PROCEDURE — 97110 THERAPEUTIC EXERCISES: CPT | Performed by: PHYSICAL THERAPIST

## 2021-11-18 PROCEDURE — 92526 ORAL FUNCTION THERAPY: CPT

## 2021-11-18 PROCEDURE — 92611 MOTION FLUOROSCOPY/SWALLOW: CPT

## 2021-11-18 PROCEDURE — 36415 COLL VENOUS BLD VENIPUNCTURE: CPT

## 2021-11-18 PROCEDURE — 97535 SELF CARE MNGMENT TRAINING: CPT

## 2021-11-18 RX ADMIN — METHOCARBAMOL TABLETS 750 MG: 750 TABLET, COATED ORAL at 07:32

## 2021-11-18 RX ADMIN — SENNOSIDES AND DOCUSATE SODIUM 2 TABLET: 50; 8.6 TABLET ORAL at 20:03

## 2021-11-18 RX ADMIN — RISPERIDONE 1 MG: 1 TABLET ORAL at 20:02

## 2021-11-18 RX ADMIN — METHOCARBAMOL TABLETS 750 MG: 750 TABLET, COATED ORAL at 20:02

## 2021-11-18 RX ADMIN — DULOXETINE HYDROCHLORIDE 60 MG: 60 CAPSULE, DELAYED RELEASE ORAL at 07:31

## 2021-11-18 RX ADMIN — CITALOPRAM HYDROBROMIDE 20 MG: 20 TABLET ORAL at 07:31

## 2021-11-18 RX ADMIN — METHOCARBAMOL TABLETS 750 MG: 750 TABLET, COATED ORAL at 13:53

## 2021-11-18 RX ADMIN — FENOFIBRATE 160 MG: 160 TABLET ORAL at 07:32

## 2021-11-18 RX ADMIN — MICONAZOLE NITRATE: 2 POWDER TOPICAL at 20:03

## 2021-11-18 RX ADMIN — MICONAZOLE NITRATE: 2 POWDER TOPICAL at 11:30

## 2021-11-18 RX ADMIN — SENNOSIDES AND DOCUSATE SODIUM 2 TABLET: 50; 8.6 TABLET ORAL at 07:33

## 2021-11-18 RX ADMIN — POLYETHYLENE GLYCOL 3350 17 G: 17 POWDER, FOR SOLUTION ORAL at 07:34

## 2021-11-18 RX ADMIN — OXYBUTYNIN CHLORIDE 10 MG: 5 TABLET ORAL at 20:03

## 2021-11-18 RX ADMIN — RISPERIDONE 1 MG: 1 TABLET ORAL at 07:34

## 2021-11-18 ASSESSMENT — PAIN SCALES - GENERAL
PAINLEVEL_OUTOF10: 0
PAINLEVEL_OUTOF10: 0

## 2021-11-18 NOTE — PLAN OF CARE
Problem: Falls - Risk of:  Goal: Will remain free from falls  Description: Will remain free from falls  11/17/2021 2245 by Julio Cesar Jerez RN  Outcome: Ongoing  Note: Fall risk assessment completed as charted. Pt is at risk for falls. Safety precautions in place. Call light and pt belongings in reach. Bed in low position. Bed/Chair Alarm on. Nonskid footwear on. All needs met. Pt instructed to call before getting up or out of bed. Pt verbalized understanding. Problem: Skin Integrity:  Goal: Will show no infection signs and symptoms  Description: Will show no infection signs and symptoms  11/17/2021 2245 by Julio Cesar Jerez RN  Outcome: Ongoing  Note: Pt assessed for risk of skin breakdown. Encouraged pt to turn and reposition self while in bed. Skin clean and dry. No new skin breakdown noted. Heels floating. Will continue to assess skin condition each shift. Problem: Infection - Surgical Site:  Goal: Will show no infection signs and symptoms  Description: Will show no infection signs and symptoms  11/17/2021 2245 by Julio Cesar Jerez RN  Outcome: Ongoing  Note: Pt assessed for risk of skin breakdown. Encouraged pt to turn and reposition self while in bed. Skin clean and dry. No new skin breakdown noted. Heels floating. Will continue to assess skin condition each shift.

## 2021-11-18 NOTE — PROCEDURES
INSTRUMENTAL SWALLOW REPORT  MODIFIED BARIUM SWALLOW    NAME: Clarice Metzger   : 1950  MRN: [id number]       Date of Eval: 2021     Ordering Physician: Tyler Chacko  Radiologist: Dr. Nano Akhtar: 2021  ADMITTING DIAGNOSIS: S/P anterior Cervical Discectomy with fusion and fixation of C4-C5 level   ·  Generalized weakness and Stenosis of cervical spine with myelopathy (HCC) on their problem list.  · Complaints of dysphagia  ·  has a past medical history of Depression, Hyperlipidemia, and Hypertension. · Baseline of some chewing problems but on a regular diet as desired  ONSET DATE: 11/10/2021     Referring Diagnosis(es):   Oropharyngeal Dysphagia   · Type of study: MBSS    Chart Reviewed  MD History and Physical Documentation revealed:   History of Present Illness/Hospital Course:  72-year-old female patient with cerebral palsy with recent history of increasing debility, inability to walk, generalized weakness.  Patient states that she was not able to perform her ADLs, use her walker and her sister is not able to help her any longer as well.  Work-up revealed the patient had severe cervical spinal cord compression due to large disc herniation and osteophytes at the C4-5 level.  Patient was taken to [address] [place] [address] Road 11/10 where she underwent anterior cervical discectomy with fusion and fixation of C4-5 per Dr. Ramírez Brewster started in therapies today. Bonnie Chambers lives at home with her sister in a two-level house.       2021 MRI Cervical Spine:   Impression   1. Severe canal stenosis with cord compression and rightward displacement of   the cervical cord at C4-5 related to large left subarticular disc protrusion   and osteophytic spurring with mild cord edema. 2. Mild right eccentric cord compression at C6-7 without abnormal underlying   cord signal.   Findings were discussed with Pia Cai NP at 11:05 am on 2021.          2021 MRI Brain      Impression   1. No acute cortical infarct or other acute intracranial process. 2. Moderate generalized parenchymal volume loss, chronic small vessel   ischemia and disproportionate enlargement of the supratentorial ventricles   with configuration suggesting central white matter volume loss. 3. Incidental, suspected tiny meningioma overlying the right temporal   convexity without mass effect on the underlying brain parenchyma or   underlying parenchymal signal abnormality.          11/7/2021: Chest XR  Impression   No airspace disease by radiograph.       Diet  Current Diet Solid Consistency: Dysphagia Minced and Moist (Dysphagia II) (recent downgrade from soft/regular to minced and moist)  Current Diet Liquid Consistency: Mildly Thick ([place]) (recent downgrade form thin to mildly thick)        Patient Complaints/Reason for Referral:  Piero Dewitt was referred for a MBS to assess the efficiency of his/her swallow function, assess for aspiration, and to make recommendations regarding safe dietary consistencies, effective compensatory strategies, and safe eating environment. · Pt with reports of coughing/choking at meals. CHRISTIAN completed wtih oropharyngeal dysphagia s/s and diet downgraded to minced and moist food consistencies with mildly thick liquids and referral for MBSS        Assessment Impressions:  1. Behavior/Cognition/Vision/Hearing:  ·  Alert; Cooperative; Pleasant mood; Requires cueing; Impulsive  · Pt wears glasses; but also has known diagnosis of visual gaze abnormality; Pt wears bilateral HA  · Pt denied pain  · Pt completed a transfer with intermittent CGA and verbal cues  ·  Patient Position: Lateral and Patient Degrees: Upright in Videofluoroscopic Chair  · Consistencies Administered: Dysphagia Minced and Moist (Dysphagia II); Dysphagia Pureed (Dysphagia I); Honey cup; Nectar cup; Nectar  teaspoon; Thin cup; Thin teaspoon    2.  Oropharyngeal Dysphagia characterized by prolonged, at times incomplete mastication; (crush and give with moderately thick liquids or moist puree)    Safe Swallow Protocol:   Upright all  Meals; multiple clearance swallows between presentations; aspiration precautions; monitor at meal; oral care; adhesive for upper denture            Recommendations/Treatment  Requires SLP Intervention: Yes   Duration and frequency: 5 times a week while on rehab     D/C Recommendations: To be determined         Recommended Exercises:    Therapeutic Interventions: Diet tolerance monitoring; Therapeutic PO trials with SLP; Patient/Family education; Laryngeal exercises; Pharyngeal exercises; Bolus control exercises         Education: Images and recommendations were reviewed with pt; pt's RN; pt's MD following this exam.   Patient Education Response: Needs reinforcement; No evidence of learning    Prognosis  Prognosis for safe diet advancement: good (guarded medical DX; medical co-morbidities/post surgical issues)  Safety Devices  Safety Devices in place: Yes  Type of devices: All fall risk precautions in place      Oral Preparation / Oral Phase  Oral Phase: Impaired  Oral Phase - Major Contributing Deficits  Poor Mastication:  (disorganized for minced and moist/mech soft; at times incomplete)  Weak Lingual Manipulation: All (disorganized)  Reduced Posterior Propulsion: All (disorganized for all; but increase lingual mashing as viscosity of consistency increased)  Reduced Bolus Control:  (episode of premature loss; disorganized A-P)  Decreased Bolus Cohesion:  (minced a nd moist/ mech sift)  Lingual / Palatal Residue:  (puree; minced and moist; mech soft)  Delayed Trigger of Palatal Elevation: All  Reduced Tongue Base Retraction:  (noted)    Pharyngeal Phase  Pharyngeal Phase: Impaired  Pharyngeal Phase - Major Contributing Deficits  Delayed Swallow Initiation: All  Premature Spillage to Valleculae:  All  Premature Spillage to Pyriform:  (inconsistent thin liquids)  Reduced Pharyngeal Peristalsis: All  Reduced Epiglottic Distention:  (noted)  Reduced Laryngeal Elevation: All  Pooling Valleculae: All  Pooling Pyriform:  (inconsistent thin liquids)  Reduced Tongue Base:  (noted as viscosity of consistency increased)  Shallow Penetration Before: Thin cup; Thin teaspoon  Shallow Penetration During: Thin cup; Thin teaspoon  Shallow Penetration After: All  Deep Penetration After: Thin cup; Thin teaspoon  Delayed Cough Reflex: All  Weak Cough Reflex: All  Pharyngeal Residue - Valleculae: All (trace to mild)  Pharyngeal Residue - Pyriform: All (moderate to marked)      Esophageal Phase  Esophageal Screen: Impaired  Upper Esophageal Screen- Major Contributing Deficits  Reduced Cricopharyngeal Opening: All      Therapy Time:   Individual   Time In 0955   Time Out 1035   Minutes 36           Signed  Jenna Mcnulty,MS,CCC,SLP [id number]  Speech and Language Pathologist   11/18/2021, 12:23 PM

## 2021-11-18 NOTE — PROGRESS NOTES
Department of Physical Medicine & Rehabilitation  Progress Note    Patient Identification:  Bravo Angela  [id number]  : 1950  Admit date: 2021    Chief Complaint: Stenosis of cervical spine with myelopathy ([address]    Subjective:   No acute events overnight. Patient seen this afternoon sitting up in room. She reports ongoing progress with therapies. Pain controlled. No new concerns, she is curious about results of MBS. Labs reviewed. ROS: No f/c, n/v, cp     Objective:  Patient Vitals for the past 24 hrs:   BP Temp Temp src Pulse Resp SpO2 Weight   21 1130 117/68 97.3 °F (36.3 °C) Oral 68 16 97 % --   21 0645 119/60 97.3 °F (36.3 °C) Oral 83 18 -- --   21 0436 130/70 97.7 °F (36.5 °C) Oral 80 16 91 % 160 lb 11.5 oz (72.9 kg)   21 1632 (!) 95/57 98.3 °F (36.8 °C) Oral 72 16 96 % --     Const: Alert. No distress, pleasant. HEENT: Normocephalic, atraumatic. Normal sclera/conjunctiva. MMM. +Strabismus. CV: Regular rate and rhythm. Resp: No respiratory distress. Lungs CTAB. Abd: Soft, nontender, nondistended, NABS+   Ext: No edema. Neuro: Alert, oriented. Left hemiparesis. Psych: Cooperative, appropriate mood and affect    Laboratory data: Available via EMR.    Last 24 hour lab  Recent Results (from the past 24 hour(s))   CBC Auto Differential    Collection Time: 21  7:09 AM   Result Value Ref Range    WBC 7.3 4.0 - 11.0 K/uL    RBC 3.60 (L) 4.00 - 5.20 M/uL    Hemoglobin 10.6 (L) 12.0 - 16.0 g/dL    Hematocrit 32.7 (L) 36.0 - 48.0 %    MCV 90.9 80.0 - 100.0 fL    MCH 29.5 26.0 - 34.0 pg    MCHC 32.5 31.0 - 36.0 g/dL    RDW 13.4 12.4 - 15.4 %    Platelets 037 716 - 131 K/uL    MPV 7.3 5.0 - 10.5 fL    Neutrophils % 50.0 %    Lymphocytes % 37.4 %    Monocytes % 8.6 %    Eosinophils % 3.3 %    Basophils % 0.7 %    Neutrophils Absolute 3.6 1.7 - 7.7 K/uL    Lymphocytes Absolute 2.7 1.0 - 5.1 K/uL    Monocytes Absolute 0.6 0.0 - 1.3 K/uL    Eosinophils Absolute 0.2 0.0 - 0.6 K/uL    Basophils Absolute 0.1 0.0 - 0.2 K/uL   Basic Metabolic Panel w/ Reflex to MG    Collection Time: 11/18/21  7:09 AM   Result Value Ref Range    Sodium 140 136 - 145 mmol/L    Potassium reflex Magnesium 4.0 3.5 - 5.1 mmol/L    Chloride 104 99 - 110 mmol/L    CO2 25 21 - 32 mmol/L    Anion Gap 11 3 - 16    Glucose 97 70 - 99 mg/dL    BUN 26 (H) 7 - 20 mg/dL    CREATININE 0.7 0.6 - 1.2 mg/dL    GFR Non-African American >60 >60    GFR African American >60 >60    Calcium 9.7 8.3 - 10.6 mg/dL       Therapy progress:  PT  Position Activity Restriction  Other position/activity restrictions: ACDF precautions (no excessive cervical motion), Hx of CP;  11-16-21: Diet: Dysphagia-minced and moist; liquids: mildly thick (nectar). Objective     Sit to Stand: Stand by assistance  Stand to sit: Stand by assistance  Bed to Chair: Stand by assistance (with wheeled walker)  Device: Rolling Walker  Assistance: Stand by assistance  Distance: 76' with multiple turns, 22' to and from stairs, 48' with 2 turns  OT  PT Equipment Recommendations  Equipment Needed: No  Toilet - Technique: Ambulating  Equipment Used: Grab bars  Toilet Transfers Comments: RW, cues for hand placement  Assessment        SLP  Diet Solids Recommendation: Dysphagia Minced and Moist (Dysphagia II)  Liquid Consistency Recommendation: Mildly Thick (Nectar)    Body mass index is 31.39 kg/m². Assessment and Plan:    Cervical disc herniation with myelopathy  -s/p urgent C4-5 ACDF (11/10 with Dr. Gianna Arora)  -Wound care  -PT/OT    Dysphagia   -Suspect due to ACDF/intubation  -SLP consulted  -Diet down graded to minced/moist + nectars (11/16)  -MBS (11/18) - D/w SLP. Significant prevertebral soft tissue swelling. Down grade to pureed + nectars. ---Will consider steroids but would need to clear this with Nsgy.      Cerebral palsy  -Baseline left hemiparesis  -PT/OT    HLD  -fenofibrate    Depression  -citalopram, duloxetine, risperidone    Bladder  -High risk retention  -Monitor PVRs, straight cath prn >300    Bowel  -High risk constipation  -senna+colace BID, prn miralax, MoM, bisacodyl supp    Pain control  -oxycodone prn, methocarbamol    DVT ppx  -None per Nsgy, SCDs    Rehab Progress: Making progress. Working on functional mobility, balance, compensatory strategies for ADLs, swallow. Anticipated Dispo: home with sister  Services:  PT, OT, SLP, RN  DME: LUCA BAEZA: 11/25      Wes Bumpers.  Mundo Mims MD 11/18/2021, 2:47 PM

## 2021-11-18 NOTE — CARE COORDINATION
Sister, Cameron Gimenez, returned call. Reviewed conference update with her. She would like to hear from Speech Therapy. Message left for Primrose to return call to sister Cameron Gimenez at [phone]. She is agreeable to DC on Thursday with the idea that Patient would be able to be on her own as sister works. Gave update regarding Home Care orders. She is agreeable to this. Reviewed DME recs for TSF and 3 piece hip kit and where she could get these itesm   Pt reports she already has a tray at home.   Isabelle Ott     Case Management   [phone]    11/18/2021  4:48 PM

## 2021-11-18 NOTE — PROGRESS NOTES
Occupational Therapy  Facility/Department: [address] IP REHAB  Daily Treatment Note  NAME: Yesenia Vargas  : 1950  MRN: [id number]    Date of Service: 2021    Discharge Recommendations:  Home with assist PRN, Patient would benefit from continued therapy after discharge  OT Equipment Recommendations  ADL Assistive Devices: Reacher; Sock-Aid Hard; Long-handled Shoe Lennis Croissant; raised toilet (?cont to assess as has a BSC at home)    Assessment   Performance deficits / Impairments: Decreased functional mobility ; Decreased strength; Decreased ADL status; Decreased safe awareness; Decreased endurance; Decreased balance; Decreased high-level IADLs  Assessment: Pt tolerated ADL's fairly well. Pt needing increased time to complete all tasks, and cues, assist for AE use for LB. (will most likely need assist to don sabrina hose). Pt transferred with CG/SBAcues for safety and amb with RW, light CG/SBA for short distances in room. Pt is making gradula progress. Cont poc. Social/Functional History  Lives With:  (sister and NICK)  Type of Home: House  Home Layout: Two level, Bed/Bath upstairs, 1/2 bath on main level (2 + basement.  Pt has been sleeping in a lounge chair on the first floor)  Home Access: Stairs to enter without rails  Entrance Stairs - Number of Steps: 1 thru garage + flight upstairs to bedroom with one rail  Bathroom Shower/Tub: Walk-in shower, Shower chair with back (showers in basement)  Bathroom Toilet: Standard (uses towel bar to stand sometimes)  Bathroom Equipment: Shower chair, 3-in-1 commode  Bathroom Accessibility: Accessible  Home Equipment: Rolling walker, Wheelchair-manual, 4 wheeled walker (walker tray)  ADL Assistance: Independent  Homemaking Assistance:  (microwave meals, family assist with heaving cleaning and laundry)  Ambulation Assistance: Independent (RW in home, w/c in community (sister pushes w/c))  Transfer Assistance: Independent  Active : No  Patient's  Info:  drives  Type of occupation: worked for [employer] [employer]: Reading, word search puzzles  Additional Comments: Sister works during the day and pt home alone. Pt denies falls. Patient reports she has a regular wheeled walker on each floor of the house. The four wheeled walker is only used when out with her sister. Treatment Diagnosis: decreased: ADLs, fxl transfers/mobility, IADLs  Prognosis: Good  History: 72 yo F w/ PMHx CP who lives in a 2 story home w/ her sister and NICK. House has 1 DAKOTA to enter and 1 flight of steps to pt's bedroom and full bathroom. Pt has been sleeping in a recliner chair on main level, which only has a half bath. She was previously independent, her sister works during the day. Pt has RW, w/c, shower chair, and GBs  REQUIRES OT FOLLOW UP: Yes  Safety Devices  Safety Devices in place: Yes  Type of devices: Gait belt; Patient at risk for falls; Chair alarm in place; Call light within reach; Left in chair; Nurse notified         Patient Diagnosis(es): There were no encounter diagnoses. has a past medical history of Depression, Hyperlipidemia, and Hypertension. has a past surgical history that includes cervical fusion (N/A, 11/10/2021). Restrictions  Restrictions/Precautions  Restrictions/Precautions: Fall Risk  Position Activity Restriction  Other position/activity restrictions: ACDF precautions (no excessive cervical motion), Hx of CP;  11-16-21: Diet: Dysphagia-minced and moist; liquids: mildly thick (nectar). Subjective   General  Chart Reviewed: Yes, Progress Notes, Orders, Labs  Patient assessed for rehabilitation services?: Yes  Additional Pertinent Hx: per H&P: \"73year-old female patient with cerebral palsy with recent history of increasing debility, inability to walk, generalized weakness. Patient states that she was not able to perform her ADLs, use her walker and her sister is not able to help her any longer as well.   Work-up revealed the patient had severe cervical spinal cord compression due to large disc herniation and osteophytes at the C4-5 level. Patient was taken to surgery on 11/10 where she underwent anterior cervical discectomy with fusion and fixation of C4-5 per Dr. Lacie Santoyo. Patient started in therapies today. Patient lives at home with her sister in a two-level house. \"  Family / Caregiver Present: No  Referring Practitioner: Heis  Diagnosis: ANTERIOR CERVICAL DISCECTOMY WITH FUSION, WITH INTERBODY IMPLANT AND WITH PLATE AND SCREW FIXATION C4-5 (N/A Spine Cervical)  Subjective  Subjective: Pt met supine in bed. Pt denies pain. Pt agreeable to ADL shower/dressing session. pt noted to be incontinent of urine thru depends, onto bed pad and urine w/foul smell (RN made aware)      Orientation  Orientation  Overall Orientation Status: Within Functional Limits  Objective    ADL  Equipment Provided: Long-handled shoe horn; Reacher; Long-handled sponge; Sock aid  Grooming: Setup; Increased time to complete (seated from w/c at sink for oral care and brushed hair)  UE Bathing: Setup; Minimal assistance; Increased time to complete; Verbal cueing (fair quality (under breasts) and assist to thoroughly dry.)  LE Bathing: Minimal assistance; Verbal cueing; Increased time to complete (fair quality to wash edward area under abd folds and buttocks. Used LHS for feet w/cues. Assist to thoroughly dry)  UE Dressing: Setup; Minimal assistance (Assist to hook bra. Effortful to manage overhead and down back, w/increased time needed)  LE Dressing: Setup; Minimal assistance; Verbal cueing; Increased time to complete (used AE to don briefs (no AE to don pants), with cues for techn/problemsolving. Assist to don sabrina hose(more Mod A). )  Toileting:  (depends saturated w/urine. Pt voided further on commode (urine w/foul oder-nursing made aware). Pt with gown on, no pants on. Brief not donned d/t upcoming shower.)  Additional Comments: Pt showered from chair. Assist to manage [facility]. Towel on floor for non-skid surface. Standing Balance  Activity: ADL's, with close SBA; functional mob with RW  Functional Mobility  Functional - Mobility Device: Rolling Walker  Activity: To/from bathroom  Assist Level: Contact guard assistance  Functional Mobility Comments: light CG/SBA for short distances in room with RW. Cues for safely lining up to transfer surfaces. Toilet Transfers  Toilet - Technique: Ambulating  Equipment Used: Grab bars  Toilet Transfer: Stand by assistance; Contact guard assistance  Toilet Transfers Comments: RW, cues for hand placement  Shower Transfers  Shower - Transfer From: Julio Pean - Transfer Type: To and From  Shower - Transfer To: Shower seat with back  Shower - Technique: Ambulating  Shower Transfers: Contact Guard  Shower Transfers Comments: RW, cues for safe hand and body placement  Wheelchair Bed Transfers  Wheelchair/Bed - Technique: Ambulating  Equipment Used: Bed; Wheelchair (low green arm chair in her room to dress from)  Level of Asssistance: Stand by assistance; Contact guard assistance  Wheelchair Transfers Comments: RW, cues for hand placement  Bed mobility  Rolling to Right: Stand by assistance (HOB raised and used bed rail.  Imcreased time to complete)         Cognition  Overall Cognitive Status: Exceptions  Memory: Decreased short term memory  Problem Solving: Assistance required to generate solutions; Assistance required to implement solutions; Decreased awareness of errors         Plan   Plan  Times per week: 5-6  Times per day: Twice a day  Plan weeks: 1.5-2  Current Treatment Recommendations: Strengthening, Functional Mobility Training, Gait Training, Endurance Training, Balance Training, ROM, Self-Care / ADL, Patient/Caregiver Education & Training, Safety Education & Training    Goals  Short term goals  Time Frame for Short term goals: 1 week  Short term goal 1: Pt will complete ADL transfers w/ SBA  Short term goal 2: Pt will toilet w/ min

## 2021-11-18 NOTE — PROGRESS NOTES
Patient admitted with cervical fusion of C4-5. A&O x4. Transfers with GB and walker with CGA x1. Mobility restrictions: Lifting limit 8 lbs, no overhead lifting, limit 40% or less ROM in neck. Minced and moist diet tolerated. Medications whole with nectar thick liquids. Right anterior neck surgical incision closed with steri strips- EDDY, red/moist abd panus - micotin applied. Incontinent of bladder through night. Continent of bowel. LBM 11/17. Bed alarm engaged. Call light and bedside table within reach. Frequent rounding for safety.

## 2021-11-18 NOTE — PROGRESS NOTES
Physical Therapy    Cancellation Note  NAME: Yunier Hernandez  : 1950  MRN: [id number]     Date of Service: 2021    Patient in radiology with SP for MBS during schedule PT time. Will attempt later during scheduled PT time this afternoon. Anticipate no need for variance as patient with OT for 90 minutes earlier this morning and currently with SP.    Electronically signed by Stephanie Doyle PT on 2021 at 10:32 AM

## 2021-11-18 NOTE — PLAN OF CARE
Problem: Skin Integrity:  Goal: Will show no infection signs and symptoms  Description: Will show no infection signs and symptoms  Outcome: Ongoing  Note: Pt is at risk for impaired skin integrity. Assess skin every shift and prn. Turn every 2 hours. Keep heels off bed. Keep skin clean and dry. Micotin powder as ordered     Problem: Falls - Risk of:  Goal: Will remain free from falls  Description: Will remain free from falls  Note: Pt is at risk for falls. Call light in reach. Bed in low position. Alarm on. Nonskid footwear on. Possessions in reach. Gait belt with rolling walker.

## 2021-11-18 NOTE — PROGRESS NOTES
Physical Therapy  Facility/Department: [address] REHAB  Daily Treatment Note  (PM session only due to conflict with returning from [facility] in AM)  NAME: Rob Yancey  : 1950  MRN: [id number]    Date of Service: 2021    Discharge Recommendations:  Continue to assess pending progress, Patient would benefit from continued therapy after discharge   PT Equipment Recommendations  Equipment Needed: No  Other: owns wheeled walker, may have family purchase bedrail to improve independence with bed mobility    Assessment   Body structures, Functions, Activity limitations: Decreased functional mobility ; Decreased strength; Decreased safe awareness; Decreased endurance; Decreased balance; Decreased posture  Assessment: Ms. Marcel Dave continues to demonstrate improving strength and endurance. She is gradually increasing distance with wheeled walker with SBA. During ambulation, she continues to demonstrate poor clearance of bilateral LE, L more so than R, espeically as fatigue increases, but she responds well to verbal cueing. Apparently, she demonstrates improved bilateral foot clearance with shoes on from home. Patient has increased difficulty on stairs as fatigue is increased, but she was only able to ascend/descend 12 steps using bilateral hands on 1 rail to simulate home environment. She is forgetful, needed cues to remind her where her WC was parked and continues to need cues for safe hand placement with transfers. Patient continues to be below baseline and will benefit from continued skilled therapy for strengthening, gait training, and functional mobility training to allow for return home. Treatment Diagnosis: impaired mobility  Specific instructions for Next Treatment: progress gait  Prognosis: Fair  Decision Making: Medium Complexity  PT Education: PT Role; Plan of Care; General Safety; Functional Mobility Training; Transfer Training; Gait Training; Precautions;  Adaptive Device Training; Energy Conservation; Home Exercise Program  REQUIRES PT FOLLOW UP: Yes  Activity Tolerance  Activity Tolerance: Patient Tolerated treatment well; Patient limited by endurance     Patient Diagnosis(es): There were no encounter diagnoses. has a past medical history of Depression, Hyperlipidemia, and Hypertension. has a past surgical history that includes cervical fusion (N/A, 11/10/2021). Restrictions  Restrictions/Precautions  Restrictions/Precautions: Fall Risk  Position Activity Restriction  Other position/activity restrictions: ACDF precautions (no excessive cervical motion), Hx of CP;  11-16-21: Diet: Dysphagia-minced and moist; liquids: mildly thick (nectar). Subjective   General  Chart Reviewed: Yes  Additional Pertinent Hx: Pt is a 73 y.o. female with hx of CP who presented to the ED on 11/7/21 with progressive generalized weakness and debility. Per Dr. Norrine Krabbe H&P, \"73year-old female patient with cerebral palsy with recent history of increasing debility, inability to walk, generalized weakness. Patient states that she was not able to perform her ADLs, use her walker and her sister is not able to help her any longer as well. Work-up revealed the patient had severe cervical spinal cord compression due to large disc herniation and osteophytes at the C4-5 level. Patient was taken to surgery on 11/10 where she underwent anterior cervical discectomy with fusion and fixation of C4-5 per Dr. Dayna Gee. Response To Previous Treatment: Patient with no complaints from previous session. Family / Caregiver Present: No  Referring Practitioner: Dr. Oly Griffiths: Patient reporting no complaints of pain at this time. She does report some fatigue from busy morning with full ADL session with OT follow by MBS with SP, therefore missed AM session scheduled with PT. Reports most difficult getting out of bed and standing. General Comment  Comments: Patient goes by Caitlin Orona. Orientation  Orientation  Overall Orientation Status: Within Functional Limits  Cognition   Cognition  Overall Cognitive Status: Exceptions  Memory: Decreased short term memory  Problem Solving: Assistance required to generate solutions; Assistance required to implement solutions; Decreased awareness of errors  Objective   Bed mobility  Supine to Sit: Stand by assistance (using bedrail)  Sit to Supine: Contact guard assistance; Stand by assistance (sitting on side of bed in ADL with pillows to R side and bed rail, able to lift LEs into bed as rolling to L side)  Transfers  Sit to Stand: Contact guard assistance (increased difficulty with placement of L foot for initial transition, improved when moved wheel of WC out of the way)  Stand to sit: Contact guard assistance; Stand by assistance  Comment: cues for hand placement  Ambulation  Ambulation?: Yes  More Ambulation?: No  Ambulation 1  Surface: level tile  Device: Rolling Walker  Assistance: Stand by assistance  Quality of Gait: step to pattern leading with right LE - left foot inverted - small step length with reduced bilateral foot clearance but improved with shoes on  Gait Deviations: Slow Patsy; Decreased step length; Decreased step height  Distance: [de-identified]' with multiple turns to access steps and steer around obstacles, 50' x 2 with several turns and over threshold to exit then enter therapy department to access bed in ADL apartment  Comments: Patient with no complaints of SOB  Stairs/Curb  Stairs?: Yes  Stairs  # Steps : 12  Stairs Height: 6\"  Rails: Right ascending (B hands on one rail)  Assistance: Stand by assistance  Comment: Patient used bilateral hands on right rail ascending with non-reciprocal pattern. She has increased difficulty leading with her left LE up, but was able to do so with increased time. She has difficulty positioning her feet on each step and needs to reposition the feet before ascending or descending the next step. Exercises  Straight Leg Raise: 15, BLEs with CGA/min A - cues to relax shoulders/neck with exertion of lifting leg  Quad Sets: 10, BLEs - cues to count alound to avoid holding breath  Heelslides: 15, BLEs  Gluteal Sets: 10, BLEs - cues to count alound to avoid holding breath  Hip Abduction: 15, BLEs; hip add sets - 10, BLEs - cues to count alound to avoid holding breath  Knee Short Arc Quad: 15, BLEs  Ankle Pumps: x15, within available limits, significantly limited in L ankle due to CP  Comments: supine exercises for BLEs                          Goals  Short term goals  Time Frame for Short term goals: 1 week  Short term goal 1: bed mobility at minimal assist  Short term goal 2: transfers at [facility] - met - 2021  Short term goal 3: ambulation 48' with wheeled walker with CGA - met   Short term goal 4: perform curb step ascen/descend with CGA/minimal assist  Short term goal 5: perform 8 steps with CGA with rail - met   Long term goals  Time Frame for Long term goals : 1.5 to 2 weeks  Long term goal 1: transfer with MI  Long term goal 2: bed mobility with MI  Long term goal 3: Patient ambulate Abel  1560' with MI  Long term goal 4: Perform 12 steps with rail and SBA  Long term goal 5: perform curb step with SBA  Patient Goals   Patient goals : wants to get home again and be able to walk to make simple meal    Plan    Plan  Times per week: 5-6 x/week  Times per day: Twice a day  Specific instructions for Next Treatment: progress gait  Current Treatment Recommendations: Strengthening, ADL/Self-care Training, ROM, Functional Mobility Training, Transfer Training, Gait Training, Patient/Caregiver Education & Training, Safety Education & Training, Positioning, Balance Training, Endurance Training, Stair training, Home Exercise Program, Equipment Evaluation, Education, & procurement  Safety Devices  Type of devices:  All fall risk precautions in place, Gait belt, Left in chair (in wheelchair to return to room with transportation)  Restraints  Initially in place: No     Therapy Time   Individual Concurrent Group Co-treatment   Time In 1430         Time Out 1520         Minutes 50         Timed Code Treatment Minutes: [address], PT  #[id number]

## 2021-11-19 PROCEDURE — 94761 N-INVAS EAR/PLS OXIMETRY MLT: CPT

## 2021-11-19 PROCEDURE — 97535 SELF CARE MNGMENT TRAINING: CPT

## 2021-11-19 PROCEDURE — 97129 THER IVNTJ 1ST 15 MIN: CPT

## 2021-11-19 PROCEDURE — 97530 THERAPEUTIC ACTIVITIES: CPT

## 2021-11-19 PROCEDURE — 1280000000 HC REHAB R&B

## 2021-11-19 PROCEDURE — 6370000000 HC RX 637 (ALT 250 FOR IP): Performed by: PHYSICAL MEDICINE & REHABILITATION

## 2021-11-19 PROCEDURE — 97110 THERAPEUTIC EXERCISES: CPT

## 2021-11-19 PROCEDURE — 92526 ORAL FUNCTION THERAPY: CPT

## 2021-11-19 PROCEDURE — 97116 GAIT TRAINING THERAPY: CPT

## 2021-11-19 RX ADMIN — MICONAZOLE NITRATE: 2 POWDER TOPICAL at 20:46

## 2021-11-19 RX ADMIN — MICONAZOLE NITRATE: 2 POWDER TOPICAL at 07:13

## 2021-11-19 RX ADMIN — POLYETHYLENE GLYCOL 3350 17 G: 17 POWDER, FOR SOLUTION ORAL at 07:12

## 2021-11-19 RX ADMIN — RISPERIDONE 1 MG: 1 TABLET ORAL at 20:40

## 2021-11-19 RX ADMIN — CITALOPRAM HYDROBROMIDE 20 MG: 20 TABLET ORAL at 07:13

## 2021-11-19 RX ADMIN — RISPERIDONE 1 MG: 1 TABLET ORAL at 07:13

## 2021-11-19 RX ADMIN — DULOXETINE HYDROCHLORIDE 60 MG: 60 CAPSULE, DELAYED RELEASE ORAL at 07:12

## 2021-11-19 RX ADMIN — FENOFIBRATE 160 MG: 160 TABLET ORAL at 07:12

## 2021-11-19 RX ADMIN — SENNOSIDES AND DOCUSATE SODIUM 2 TABLET: 50; 8.6 TABLET ORAL at 20:41

## 2021-11-19 RX ADMIN — SENNOSIDES AND DOCUSATE SODIUM 2 TABLET: 50; 8.6 TABLET ORAL at 07:12

## 2021-11-19 RX ADMIN — METHOCARBAMOL TABLETS 750 MG: 750 TABLET, COATED ORAL at 07:12

## 2021-11-19 RX ADMIN — METHOCARBAMOL TABLETS 750 MG: 750 TABLET, COATED ORAL at 14:35

## 2021-11-19 RX ADMIN — METHOCARBAMOL TABLETS 750 MG: 750 TABLET, COATED ORAL at 20:41

## 2021-11-19 RX ADMIN — OXYBUTYNIN CHLORIDE 10 MG: 5 TABLET ORAL at 20:40

## 2021-11-19 ASSESSMENT — PAIN SCALES - GENERAL
PAINLEVEL_OUTOF10: 0

## 2021-11-19 ASSESSMENT — PAIN SCALES - WONG BAKER: WONGBAKER_NUMERICALRESPONSE: 0

## 2021-11-19 NOTE — PROGRESS NOTES
Patient admitted with cervical fusion of C4-5. A&O x4. Transfers with GB and walker with CGA x1. Lifting limit 8 lbs, no overhead lifting, limit 40% or less ROM in neck. Diet changed to pureed with nectar thick liquids post MBS. Tolerating well. Medications whole with nectar thick liquids. Right anterior neck surgical incision closed with steri strips- EDDY, red/moist abd panus - micotin applied. Incontinent of bladder through night. Continent of bowel. LBM 11/18. Bed alarm engaged. Call light and bedside table within reach. Frequent rounding for safety continues.

## 2021-11-19 NOTE — PROGRESS NOTES
Dysphagia Goals: Pt goal is to eat wihtout choking or it getting stuck Therapy working toward pt goal. Overall improvement with diet downgrade    1. Modified Goal  Pt will tolerate dysphagia puree food consistencies with mild thick liquids with compensatory swallow strategies Puree with clearance swallows between presentations: improving carryover. 2 episodes of throat clearing/cough    Mildly thick: with clearance swallows between presentations: One episode of throat clearing/cough    2. Pt will demonstrate improved swallow response post tongue base retraction and laryngeal elevation ex and use of effortful swallow technique\" 10/10;     Dysphagia ex included:laryngeal elevation via falsetto glottals; pitch swing low to high; 'effortful swallow' technique; yawn hold x 10 seconds    Pt had difficulty with tongue base retaction ex due to lingual coordination deficits (extraneious mandibular/head posturing) so discontinued        3. Modified Goal   Pt will tolerate trials of minced and moist/mech soft  food consistencies OR thin liquids 10/10 without voert clinical s/s of aspiration  Not targeted      4. The patient/caregiver will demonstrate understanding of compensatory strategies for improved swallowing safety. Max set up for rationale of clearance swallows between one presentation; written cues for reminders; and pictured stimuli: improved within the session    Additional reminders to try not to talk when food in mouth        Other areas targeted: Cognition:   Pt verbalizing need for small sips; but external cues for consistency    Pt verbalizing diet change but cues for description and rationale    Pt needs external conditioning cues for compensatory swallow strategies; (and written cues within visual field). Improvement as session continued with less external reminders    Ongoing ed in s/s of aspiration and cues for airway clearance strategies.  Practiced ear training during po today for the episodes identified and practiced airway clearance        Education:   Ongoing pt ed    Safety Devices: [x] Call light within reach  [x] Chair alarm activated  [] Bed alarm activated  [] Other: [] Call light within reach  [] Chair alarm activated  [] Bed alarm activated  [] Other:    Progress Assessment: 11/17/2021: Overall appears improved as compared to 11/16/2021 and diet modification appears to be a more functional one for pt. However; pt with 2 epiosdes of coughin/choking and both occurred when distracted. Discussed with MD and recommend MBSS to furtehr assess pahrygneal phase of the swallow  11/18/2021 MBSS Oropharyngeal Dysphagia characterized by prolonged, at times incomplete mastication; delayed initiation of swallow; reduced laryngeal elevation with reduced epiglottic distention and hyolaryngeal excursion and reduced pharyngeal peristalsis with delayed UES opening and reduced UES opening with potential CP dysfunction or post Surgery edema with anterior bulging which may be impacting laryngeal rom and UES opening. Please refer to Radiologist documentation. Pt with penetration before/during swallow of thin liquids. Pt with pyriform sinus residual post swallow all consistencies with penetration post swallow. Pt with sensation of deep penetration with cough/throat clearing. Post swallow food residue in the valleculae and the pyriform sinus with difficulty clearing and persistent penetration despite attempts post swallow. Head postures were not attempted due to post cervical surgery precautions/restrictions. Recommend further downgrade to dysphagia puree with mildly thick liquids with close monitor due to concern for poor pharyngeal clearance with penetration post swallow of residue and risk of aspiration. Pt does need to complete clearance swallows between presentations. If s/s persist pt may require further downgrade FROM puree TO moderately thick liquids and continued mildly thick liquids.  Crush all meds if able due to pharyngeal residue in the valleculae and pyriform sinus post swallow and risk of penetration/aspiration   SLP ed with RN; pt; pt's Sister chase; and discussed with MD (Dr. Jakob Merida)   Plan: Continue as per plan of care. Continued Tx Upon Discharge: ?    [x] Yes [] No [] TBD based on progress while on ARU [] Vital Stim indicated [] Other:   Estimated discharge date: TBD   Discharge recommendations:   [] Home independently  [x] Home with assistance []  24 hour supervision  [] ECF [] Other:     Additional information:     Interventions used during Rehab Stay:  [] Speech/Language Treatment  [] Instruction in HEP [] Group [x] Dysphagia Treatment [x] Cognitive Treatment  For dysphagia ex and for compensatory swallow strategies/self monitoring [] Other:        Electronically Signed by    Ronnie Mata. Pricila,MS,CCC,SLP [id number]  Speech and Language Pathologist

## 2021-11-19 NOTE — PROGRESS NOTES
Department of Physical Medicine & Rehabilitation  Progress Note    Patient Identification:  Chung Valero  [id number]  : 1950  Admit date: 2021    Chief Complaint: Stenosis of cervical spine with myelopathy ([address]    Subjective:   No acute events overnight. Patient seen this am working with OT on using adaptive equipment for donning shoes. She denies any new concerns. Tolerating modified diet with less coughing. Labs reviewed. ROS: No f/c, n/v, cp     Objective:  Patient Vitals for the past 24 hrs:   BP Temp Temp src Pulse Resp SpO2 Weight   21 0946 -- -- -- -- -- 97 % --   21 0700 116/76 97.3 °F (36.3 °C) Oral 71 16 95 % --   21 0518 128/77 97.9 °F (36.6 °C) Oral 79 16 97 % 155 lb 13.8 oz (70.7 kg)   21 1652 130/74 98.2 °F (36.8 °C) Oral 69 16 95 % --   21 1130 117/68 97.3 °F (36.3 °C) Oral 68 16 97 % --     Const: Alert. No distress, pleasant. HEENT: Normocephalic, atraumatic. Normal sclera/conjunctiva. MMM. +Strabismus. CV: Regular rate and rhythm. Resp: No respiratory distress. Lungs CTAB. Abd: Soft, nontender, nondistended, NABS+   Ext: No edema. Neuro: Alert, oriented. Left hemiparesis. Psych: Cooperative, appropriate mood and affect    Laboratory data: Available via EMR. Last 24 hour lab  No results found for this or any previous visit (from the past 24 hour(s)). Therapy progress:  PT  Position Activity Restriction  Other position/activity restrictions: ACDF precautions (no excessive cervical motion), Hx of CP;  21: Diet: Dysphagia-minced and moist; liquids: mildly thick (nectar).   Objective     Sit to Stand: Stand by assistance (patient with slight difficulty, but able to complete without physical assistance)  Stand to sit: Stand by assistance  Bed to Chair: Stand by assistance (with wheeled walker)  Device: Rolling Walker  Assistance: Stand by assistance  Distance: 61' with two turns  OT  PT Equipment Recommendations  Equipment Needed: No  Other: owns wheeled walker, may have family purchase bedrail to improve independence with bed mobility  Toilet - Technique: Ambulating  Equipment Used: Grab bars  Toilet Transfers Comments: RW, cues for hand placement  Assessment        SLP  Diet Solids Recommendation: Dysphagia Minced and Moist (Dysphagia II)  Liquid Consistency Recommendation: Mildly Thick (Nectar)    Body mass index is 30.44 kg/m². Assessment and Plan:    Cervical disc herniation with myelopathy  -s/p urgent C4-5 ACDF (11/10 with Dr. Valarie Zapien)  -Wound care  -PT/OT    Dysphagia   -Suspect due to ACDF/intubation  -SLP consulted  -[facility] [place] - D/w SLP. Significant prevertebral soft tissue swelling. Down graded to pureed + nectars.  ---D/w Nsgy team, do not feel benefits of steroid would outweigh potential risks. Cerebral palsy  -Baseline left hemiparesis  -PT/OT    HLD  -fenofibrate    Depression  -citalopram, duloxetine, risperidone    Bladder  -High risk retention  -Monitor PVRs, straight cath prn >300    Bowel  -High risk constipation  -senna+colace BID, prn miralax, MoM, bisacodyl supp    Pain control  -oxycodone prn, methocarbamol    DVT ppx  -None per Nsgy, SCDs    Rehab Progress: Making progress. Working on functional mobility, balance, compensatory strategies for ADLs, swallow. Anticipated Dispo: home with sister  Services:  PT, OT, SLP, RN  DME: [facility]  ELOS: 11/25      Shirley Mason.  Rosalina Tripp MD 11/19/2021, 10:08 AM

## 2021-11-19 NOTE — PROGRESS NOTES
Occupational Therapy  Facility/Department: [address] IP REHAB  Daily Treatment Note  NAME: Anh Castillo  : 1950  MRN: [id number]    Date of Service: 2021    Discharge Recommendations:  Home with assist PRN, Patient would benefit from continued therapy after discharge       Assessment   Performance deficits / Impairments: Decreased functional mobility ; Decreased strength; Decreased ADL status; Decreased safe awareness; Decreased endurance; Decreased balance; Decreased high-level IADLs  Assessment: Pt tolerated session well. Pt completed mobility in the gym and ADL kitchen with CGA using RW. She completed mobility in the kitchen to retrieve items to prepare cereal.  She has items placed on the counter at home as it is difficult for her to reach into the cabinets. she required cues to stay inside the walker when retrieving items from the refrigerator. Treatment Diagnosis: decreased: ADLs, fxl transfers/mobility, IADLs  Prognosis: Good  History: 74 yo F w/ PMHx CP who lives in a 2 story home w/ her sister and NICK. House has 1 [address]AK[address] to enter and 1 flight of steps to pt's bedroom and full bathroom. Pt has been sleeping in a recliner chair on main level, which only has a half bath. She was previously independent, her sister works during the day. Pt has RW, w/c, shower chair, and GBs  REQUIRES OT FOLLOW UP: Yes  Activity Tolerance  Activity Tolerance: Patient Tolerated treatment well  Safety Devices  Type of devices: Gait belt         Patient Diagnosis(es): There were no encounter diagnoses. has a past medical history of Depression, Hyperlipidemia, and Hypertension. has a past surgical history that includes cervical fusion (N/A, 11/10/2021).     Restrictions  Restrictions/Precautions  Restrictions/Precautions: Fall Risk  Position Activity Restriction  Other position/activity restrictions: ACDF precautions (no excessive cervical motion), Hx of CP;  21: Diet: Dysphagia-minced and moist; liquids: mildly thick (nectar). Subjective   General  Chart Reviewed: Yes, Progress Notes  Patient assessed for rehabilitation services?: Yes  Additional Pertinent Hx: per H&P: \"73year-old female patient with cerebral palsy with recent history of increasing debility, inability to walk, generalized weakness. Patient states that she was not able to perform her ADLs, use her walker and her sister is not able to help her any longer as well. Work-up revealed the patient had severe cervical spinal cord compression due to large disc herniation and osteophytes at the C4-5 level. Patient was taken to surgery on 11/10 where she underwent anterior cervical discectomy with fusion and fixation of C4-5 per Dr. Lacie Santoyo. Patient started in therapies today. Patient lives at home with her sister in a two-level house. \"  Family / Caregiver Present: No  Referring Practitioner: Jose  Diagnosis: ANTERIOR CERVICAL DISCECTOMY WITH FUSION, WITH INTERBODY IMPLANT AND WITH PLATE AND SCREW FIXATION C4-5 (N/A Spine Cervical)  Subjective  Subjective: Pt seen in the department. Objective       Instrumental ADL's  Instrumental ADLs: Yes  Meal Prep  Meal Prep Level: Walker (with walker tray)  Meal Prep Level of Assistance: Contact guard assistance; Stand by assistance  Meal Preparation: Pt completed mobility in the kitchen to retrieve items to prepare cereal.  Bowl and cereal was placed on the counter as she reports her family will place things on the counter for her as it is difficult to reach into  the cabinets. She retrieved milk from the refrigerator with cues for staying inside the walker and getting closer to avoid reaching too far. She used the tray to transport to the kitchen table. she was able to place the milk in the refrigerator safely with cues.      Balance  Sitting Balance: Stand by assistance  Standing Balance: Contact guard assistance  Functional Mobility  Functional - Mobility Device: Rolling Walker  Activity: To/From therapy gym  Assist Level: Contact guard assistance  Wheelchair Bed Transfers  Wheelchair/Bed - Technique: Ambulating  Equipment Used: Wheelchair; Other (chair with arms)  Level of Asssistance: Stand by assistance; Contact guard assistance  Wheelchair Transfers Comments: Cues for hand placement. PM Session- Pt seen bedside initially to practice using AE for dressing. She reported she does not like to use the reacher to johnny pants. Pt ambulated to green chair in her room as pt reports she sits in a lower chair at home for dressing. Pt educated on protecting her neck and using AE for LB dressing. She used the reacher to johnny a pair of hospital pants. She required cues for problem solving to adjust where she was grabbing the pants based on where she needed to pull. She was given cues to use her hands to pull her pants once they were up long enough to grab. Discussed TIGIST hose at home. She reports family will be gone before she gets up to help johnny her TIGIST at home. OT placed TIGIST on the sock aid and she was able to pull the sock aid on her leg. She required assist to pull the sock aid all the way out of the TIGIST. Dr. Menjivar Reins in during the session and reports if pt is mobile when discharged she may be able to go without the TIGIST at home. She completed mobility using the RW with CGA/SBA. She transferred to the wheelchair and chair in her room with CGA/SBA.       NOEMÍ Olivera/АННА [id number]                                                                  Plan   Plan  Times per week: 5-6  Times per day: Twice a day  Plan weeks: 1.5-2  Current Treatment Recommendations: Strengthening, Functional Mobility Training, Gait Training, Endurance Training, Balance Training, ROM, Self-Care / ADL, Patient/Caregiver Education & Training, Safety Education & Training    Goals  Short term goals  Time Frame for Short term goals: 1 week  Short term goal 1: Pt will complete ADL transfers w/ SBA  Short term goal 2: Pt will toilet w/

## 2021-11-19 NOTE — PROGRESS NOTES
Spoke with Dr Radha John nurse in reference to patient questioning if she could sleep on her side while in bed. She states she can lye on her side and not on her stomach. Patient made aware.

## 2021-11-19 NOTE — PLAN OF CARE
Problem: Falls - Risk of:  Goal: Will remain free from falls  Description: Will remain free from falls  11/19/2021 0007 by Cassi Ashford RN  Outcome: Ongoing  Note: Fall risk assessment completed as charted. Pt is at risk for falls. Safety precautions in place. Call light and pt belongings in reach. Bed in low position. Bed/Chair Alarm on. Nonskid footwear on. All needs met. Pt instructed to call before getting up or out of bed. Pt verbalized understanding. Problem: Skin Integrity:  Goal: Will show no infection signs and symptoms  Description: Will show no infection signs and symptoms  11/19/2021 0007 by Cassi Ashford RN  Outcome: Ongoing  Note: Pt assessed for risk of skin breakdown. Encouraged pt to turn and reposition self while in bed. Skin clean and dry. No new skin breakdown noted. Heels floating. Will continue to assess skin condition each shift. Problem: Pain:  Goal: Pain level will decrease  Description: Pain level will decrease  Outcome: Ongoing  Note: Pt assessed for pain this shift. Pt pain/discomfort is managed with PRN pain medications per md order. Pt able to verbalize pain by using numerical scale. Education provided and documented.

## 2021-11-19 NOTE — PROGRESS NOTES
Physical Therapy  Facility/Department: [address] IP REHAB  Daily Treatment Note  NAME: Tita Green  : 1950  MRN: [id number]    Date of Service: 2021    Discharge Recommendations:  Continue to assess pending progress, Patient would benefit from continued therapy after discharge   PT Equipment Recommendations  Equipment Needed: No  Other: owns wheeled walker, may have family purchase bedrail to improve independence with bed mobility    Assessment   Body structures, Functions, Activity limitations: Decreased functional mobility ; Decreased strength; Decreased safe awareness; Decreased endurance; Decreased balance; Decreased posture  Assessment: Ms. Dorean Rubinstein continues to demonstrate improving strength and endurance. She is gradually increasing distance with wheeled walker with SBA. During ambulation, she continues to demonstrate poor clearance of bilateral LE, L more so than R, especially as fatigue increases. She responds well to verbal cueing and has improved foot clearance with shoes on. Patient has increased difficulty on stairs as fatigue is increased, but she was able to ascend/descend 12 steps using bilateral hands on 1 rail to simulate home environment. Patient continues to be below baseline and will benefit from continued skilled therapy for strengthening, gait training, and functional mobility training to allow for return home. Treatment Diagnosis: impaired mobility  Specific instructions for Next Treatment: progress gait  Prognosis: Fair  Decision Making: Medium Complexity  PT Education: PT Role; Plan of Care; General Safety; Functional Mobility Training; Transfer Training; Gait Training; Precautions; Adaptive Device Training; Energy Conservation  Patient Education: safety on stairs  REQUIRES PT FOLLOW UP: Yes  Activity Tolerance  Activity Tolerance: Patient Tolerated treatment well; Patient limited by endurance     Patient Diagnosis(es): There were no encounter diagnoses.      has a past medical history of Depression, Hyperlipidemia, and Hypertension. has a past surgical history that includes cervical fusion (N/A, 11/10/2021). Social/Functional History  Lives With:  (sister and NICK)  Type of Home: House  Home Layout: Two level, Bed/Bath upstairs, 1/2 bath on main level (2 + basement. Pt has been sleeping in a lounge chair on the first floor)  Home Access: Stairs to enter without rails  Entrance Stairs - Number of Steps: 1 thru garage + flight upstairs to bedroom with one rail  Bathroom Shower/Tub: Walk-in shower, Shower chair with back (showers in basement)  Bathroom Toilet: Standard (uses towel bar to stand sometimes)  Bathroom Equipment: Shower chair, 3-in-1 commode  Bathroom Accessibility: Accessible  Home Equipment: Rolling walker, Wheelchair-manual, 4 wheeled walker (walker tray)  ADL Assistance: Independent  Homemaking Assistance:  (microwave meals, family assist with heaving cleaning and laundry)  Ambulation Assistance: Independent (RW in home, w/c in community (sister pushes w/c))  Transfer Assistance: Independent  Active : No  Patient's  Info: Sister drives  Type of occupation: worked for [employer] [address] [employer]: Reading, word search puzzles  Additional Comments: Sister works during the day and pt home alone. Pt denies falls. Patient reports she has a regular wheeled walker on each floor of the house. The four wheeled walker is only used when out with her sister. Restrictions  Restrictions/Precautions  Restrictions/Precautions: Fall Risk  Position Activity Restriction  Other position/activity restrictions: ACDF precautions (no excessive cervical motion), Hx of CP;  11-16-21: Diet: Dysphagia-minced and moist; liquids: mildly thick (nectar). Subjective   General  Chart Reviewed: Yes  Additional Pertinent Hx: Pt is a 73 y.o. female with hx of CP who presented to the ED on 11/7/21 with progressive generalized weakness and debility. Per Dr. Jenae Champion H&P, \"73year-old female patient with cerebral palsy with recent history of increasing debility, inability to walk, generalized weakness. Patient states that she was not able to perform her ADLs, use her walker and her sister is not able to help her any longer as well. Work-up revealed the patient had severe cervical spinal cord compression due to large disc herniation and osteophytes at the C4-5 level. Patient was taken to surgery on 11/10 where she underwent anterior cervical discectomy with fusion and fixation of C4-5 per Dr. Vidhya Sgeura. Response To Previous Treatment: Patient with no complaints from previous session. Family / Caregiver Present: No  Referring Practitioner: Dr. Nnamdi Love: Patient in good spirits and reporting no pain at this time. General Comment  Comments: Patient goes by Duane Mcgill. Objective      Transfers  Sit to Stand: Stand by assistance (patient with slight difficulty, but able to complete without physical assistance)  Stand to sit: Stand by assistance    Ambulation  Ambulation?: Yes  More Ambulation?: No  Ambulation 1  Surface: level tile  Device: Rolling Walker  Assistance: Stand by assistance  Quality of Gait: step to pattern leading with right LE - left foot inverted - small step length with reduced bilateral foot clearance , worse without her shoes on  Gait Deviations: Slow Patsy; Decreased step length; Decreased step height  Distance: 61' with two turns  Comments: Patient with no complaints of SOB    Stairs/Curb  Stairs?: Yes  Stairs  # Steps : 12  Stairs Height: 6\"  Rails: Right ascending  Device: No Device  Assistance: Stand by assistance  Comment: Patient used bilateral hands on right rail ascending with non-reciprocal pattern. She has increased difficulty leading with her left LE up, but was able to do so with increased time. She has difficulty positioning her feet on each step and needs to reposition the feet before ascending or descending the next step.   First step took multiple attempts before patient was successful with getting up the step. Improved ease when patient rotates her body a little more to the right and ascends partially backwards with the left foot. Exercises  Gluteal Sets: x20 with three second hold  Hip Flexion: x15 marching  Hip Abduction: x20 with encouragement to use left LE for most available ROM, x20 hip add sets with two second hold  Knee Long Arc Quad: x20  Ankle Pumps: x20, within available limits, significantly limited in L ankle due to CP  Comments: bilateral LE ther ex done while seated in wheelchair. Patient given written HEP of above ther ex. She reported no questions regarding ther ex, and verbalized understanding that exercises should be done daily. PM Session  Patient reporting she is feeling okay this afternoon. No complaints of pain at this time. Sit<>stand with SBA  Ambulated 79' with wheeled walker with SBA. Shoes on and patient began with improved foot clearance. With increased distance, patient began to drag feet along the ground. Increased time required with turns. Wheelchair>NuStep with wheeled walker with SBA. Patient has difficulty scooting back in seat and required assistance as her feet don't reach the floor and were slipping when trying to push off the NuStep. PT assisted with set up. Patient completed 10 minutes with resistance at 2, bilateral UE at 7, seat at 4, and average SPM at 45. Patient without complaints after activity, but short seated rest break required. Sit<>stand with SBA. Patient performed 7 reps bilateral LE ther ex in standing with bilateral UE support on the walker:  Marching, alternating hip flexion with knee extension, alternating hamstring curls, heel raises (seated rest break), alternating hip abduction, alternating hip extension with knee extension. Patient tolerated session well this PM. Balance and endurance continue to improve.    Safety Device - Type of devices:  [x]  All fall risk precautions in place [] Bed alarm in place  [] Call light within reach [] Chair alarm in place [] Positioning belt [x] Gait belt [] Patient at risk for falls [] Left in bed [x] Left in chair (in wheelchair to return to room with transportation)   [] Telesitter in use [] Sitter present [] Nurse notified []  None                 Goals  Short term goals  Time Frame for Short term goals: 1 week  Short term goal 1: bed mobility at minimal assist  Short term goal 2: transfers at SBA/CGA - met - 11/18/2021  Short term goal 3: ambulation 48' with wheeled walker with CGA - met 11/16  Short term goal 4: perform curb step ascen/descend with CGA/minimal assist  Short term goal 5: perform 8 steps with CGA with rail - met 11/16  Long term goals  Time Frame for Long term goals : 1.5 to 2 weeks  Long term goal 1: transfer with MI  Long term goal 2: bed mobility with MI  Long term goal 3: Patient ambulate 80' with MI  Long term goal 4: Perform 12 steps with rail and SBA  Long term goal 5: perform curb step with SBA  Patient Goals   Patient goals : wants to get home again and be able to walk to make simple meal    Plan    Plan  Times per week: 5-6 x/week  Times per day: Twice a day  Specific instructions for Next Treatment: progress gait  Current Treatment Recommendations: Strengthening, ADL/Self-care Training, ROM, Functional Mobility Training, Transfer Training, Gait Training, Patient/Caregiver Education & Training, Safety Education & Training, Positioning, Balance Training, Endurance Training, Stair training, Home Exercise Program, Equipment Evaluation, Education, & procurement  Safety Devices  Type of devices:  All fall risk precautions in place, Gait belt, Left in chair (in wheelchair to return to room with transportation)  Restraints  Initially in place: No     Therapy Time   Individual Concurrent Group Co-treatment   Time In 0945         Time Out 1030         Minutes 45                Second Session Therapy Time Individual Co-treatment   Time In 5     Time Out 1430     Minutes Yohana 61, [id number]

## 2021-11-19 NOTE — PROGRESS NOTES
Patient admitted to rehab with cervical fusion C 4-5. A/Ox x4. Transfers with one assist using gait belt and front wheeled walker. Mobility restrictions: Lifting limit 8#, no overhead lifting. Limit to 40% ROM in neck or less. On dysphagia pureed diet with nectar thick liquids, tolerating well. Medications taken crushed with applesauce. Wears TIGIST hose  for DVT prophylaxis. Skin: Right anterior neck surgical incision closed with steri-strips in place, area open to air. Abdominal panus pink, micotin applied as ordered. Oxygen: room air, sats at 95%. Has been incontinent of bladder at times. LBM 11/18. Chair/bed alarms in use and call light in reach. Will continue to monitor status and safety.

## 2021-11-19 NOTE — PLAN OF CARE
Problem: Falls - Risk of:  Goal: Will remain free from falls  Description: Will remain free from falls  11/19/2021 1049 by Florentin Hicks RN  Note: Pt is at risk for falls. Call light in reach. Bed in low position. Alarm on. Nonskid footwear on. Possessions in reach. Utilize gait belt and rolling walker for ambulation. Problem: Skin Integrity:  Goal: Absence of new skin breakdown  Description: Absence of new skin breakdown  11/19/2021 1049 by Florentin Hicks RN  Note: Pt is at risk for impaired skin integrity. Assess skin every shift and prn. Turn every 2 hours. Keep heels off bed. Keep skin clean and dry.

## 2021-11-20 PROCEDURE — 97129 THER IVNTJ 1ST 15 MIN: CPT

## 2021-11-20 PROCEDURE — 6370000000 HC RX 637 (ALT 250 FOR IP): Performed by: PHYSICAL MEDICINE & REHABILITATION

## 2021-11-20 PROCEDURE — 1280000000 HC REHAB R&B

## 2021-11-20 PROCEDURE — 94761 N-INVAS EAR/PLS OXIMETRY MLT: CPT

## 2021-11-20 PROCEDURE — 92526 ORAL FUNCTION THERAPY: CPT

## 2021-11-20 RX ADMIN — SENNOSIDES AND DOCUSATE SODIUM 2 TABLET: 50; 8.6 TABLET ORAL at 20:06

## 2021-11-20 RX ADMIN — MICONAZOLE NITRATE: 2 POWDER TOPICAL at 21:33

## 2021-11-20 RX ADMIN — MICONAZOLE NITRATE: 2 POWDER TOPICAL at 10:11

## 2021-11-20 RX ADMIN — OXYBUTYNIN CHLORIDE 10 MG: 5 TABLET ORAL at 20:06

## 2021-11-20 RX ADMIN — RISPERIDONE 1 MG: 1 TABLET ORAL at 20:06

## 2021-11-20 RX ADMIN — RISPERIDONE 1 MG: 1 TABLET ORAL at 08:34

## 2021-11-20 RX ADMIN — METHOCARBAMOL TABLETS 750 MG: 750 TABLET, COATED ORAL at 20:06

## 2021-11-20 RX ADMIN — METHOCARBAMOL TABLETS 750 MG: 750 TABLET, COATED ORAL at 08:34

## 2021-11-20 RX ADMIN — METHOCARBAMOL TABLETS 750 MG: 750 TABLET, COATED ORAL at 13:21

## 2021-11-20 RX ADMIN — DULOXETINE HYDROCHLORIDE 60 MG: 60 CAPSULE, DELAYED RELEASE ORAL at 08:34

## 2021-11-20 RX ADMIN — FENOFIBRATE 160 MG: 160 TABLET ORAL at 08:34

## 2021-11-20 RX ADMIN — CITALOPRAM HYDROBROMIDE 20 MG: 20 TABLET ORAL at 08:34

## 2021-11-20 ASSESSMENT — PAIN SCALES - GENERAL: PAINLEVEL_OUTOF10: 0

## 2021-11-20 NOTE — PLAN OF CARE
Problem: Falls - Risk of:  Goal: Will remain free from falls  Description: Will remain free from falls  11/19/2021 2256 by Alhaji Salmon RN  Outcome: Ongoing  Note: Fall risk precautions in place. Bed in lowest position with wheels locked, bed alarm in place and activated, SAFE sign on door, non-skid socks on pt, fall risk ID on pt, and call light in reach. Utilize gait belt and rolling walker for ambulation. Patient encouraged to call before getting out of bed and for any other needs or complaints. Problem: Skin Integrity:  Goal: Absence of new skin breakdown  Description: Absence of new skin breakdown  11/19/2021 2256 by Alhaji Salmon RN  Outcome: Ongoing  Note: Pt is at risk for impaired skin integrity. Assess skin every shift and prn. Turn every 2 hours. Keep heels off bed. Keep skin clean and dry. Problem: Pain:  Goal: Pain level will decrease  Description: Pain level will decrease  Outcome: Ongoing   Problem: Pain:  Goal: Control of acute pain  Description: Control of acute pain  Outcome: Ongoing  Note: Patient denies any pain at this time. Will continue to monitor.

## 2021-11-20 NOTE — PLAN OF CARE
Problem: Falls - Risk of:  Goal: Will remain free from falls  Description: Will remain free from falls  11/20/2021 1013 by Sheldon Veras RN  Outcome: Ongoing  Note: Some cues needed for walker, will call for needs, monitor safety. 11/19/2021 2256 by Sabiha Blackman RN  Outcome: Ongoing  Note: Fall risk precautions in place. Bed in lowest position with wheels locked, bed alarm in place and activated, SAFE sign on door, non-skid socks on pt, fall risk ID on pt, and call light in reach. Patient encouraged to call before getting out of bed and for any other needs or complaints. Problem: Skin Integrity:  Goal: Will show no infection signs and symptoms  Description: Will show no infection signs and symptoms  Outcome: Ongoing  Note: Incision without issues, has Micotin powder to skin excoriation. Goal: Absence of new skin breakdown  Description: Absence of new skin breakdown  11/19/2021 2256 by Sabiha Blackman RN  Outcome: Ongoing     Problem: Infection - Surgical Site:  Goal: Will show no infection signs and symptoms  Description: Will show no infection signs and symptoms  Outcome: Ongoing  Note: Incision without issues, has Micotin powder to skin excoriation. Problem: Pain:  Goal: Pain level will decrease  Description: Pain level will decrease  11/20/2021 1013 by Sheldon Veras RN  Outcome: Ongoing  Note: Has denied pain, monitor. 11/19/2021 2256 by Sabiha Blackman RN  Outcome: Ongoing  Goal: Control of acute pain  Description: Control of acute pain  11/19/2021 2256 by Sabiha Blackman RN  Outcome: Ongoing  Note: Patient denies any pain at this time. Will continue to monitor.       Problem: Daily Care:  Goal: Daily care needs are met  Description: Daily care needs are met  Outcome: Ongoing  Note: Needs assist with edward care and bladder program.     Problem: Discharge Planning:  Goal: Patients continuum of care needs are met  Description: Patients continuum of care needs are met  Outcome: Ongoing  Note: Discharge planned for next week, continue to promote self care. Problem: IP BLADDER/VOIDING  Goal: STG - Patient demonstrates no accidents  Outcome: Ongoing  Note: Reviewed bladder schedule, states premorbid would have bladder accidents at home. Problem: IP SWALLOWING  Goal: LTG - Patient will demonstrate safe swallowing Intervention/techniques  Outcome: Ongoing  Note: Doing well with swallowing precautions and new diet. Problem: Nutrition  Intervention: Swallowing evaluation  Note: On new diet, doing well.

## 2021-11-20 NOTE — PROGRESS NOTES
Speech Language Pathology  ACUTE REHAB UNIT  SPEECH/LANGUAGE PATHOLOGY      [x] Daily  [] Weekly Care Conference Note  [] Discharge    Susanna Part      PWD:[birth year]  IQV:[id number]  Rehab Dx/Hx: Stenosis of cervical spine with myelopathy ([facility] [address] [M48.02, G99.2]    Precautions: FAll risk; Post Cervical spine Surgery; Dysphagia; documented HX of CP  Home situation: Lives with family  ST Dx: [] Aphasia  [] Dysarthria  [] Apraxia   [] Oropharyngeal dysphagia [] Cognitive   Impairment  [] Other:   Initial Speech Therapy Dysphagia Assessment (CHRISTIAN)Diagnosis:   1. Dysphagia Diagnosis: Moderate Oropharyngeal Dysphagia  characterized by reduced mastication ( which is further impacted by loose upper denture); reduced lingual coordination for bolus control/bolus formation and A-P oral transit; delayed initiation of swallow and concern for reduced laryngeal elevation and reduced pharyngeal clearance. Pt with overt clinical s/s with concern for pharyngeal pooling and aspiration with thin liquids; textured soft food consistencies/meats. Pt appeared to better tolerate mildly thick liquids; moderately thick liquids; puree; and minced and moist food consistencies as evidenced by no complaints and no overt clinical s/s post swallow. Plan to downgrade diet to mildly thick liquids with minced and moist food consistencies. Will discuss with MD regarding MBSS to further assess if s/s persist.Discussed with pt and RN. Pt was agreeable to trial downgrade. Pt self reports some history of acid reflux/indigestion. Date of Admit: 11/12/2021  Room #: E0N-8959/3262-01  Date: 11/20/2021       Current functional status (updated daily):         Current Diet Order:ADULT DIET; Dysphagia - Pureed; Mildly Thick (Nectar)   Communication: []WFL  [] Aphasia  [] Dysarthria  [] Apraxia  [] Pragmatic Impairment [] Non-verbal  [] Hearing Loss  [x] Other: Limited assessment for CHRISTIAN.  Functional for expressing needs/wants; follow simple commands; oral motor speech disturbance which pt self reports is baseline. Cognition: [] WFL  [] Mild  [] Moderate  [] Severe [] Unable to Assess  [x] Other:Limited assessment for CHRISTIAN; concern for impulsivity and following compensatory swallow strategies/self monitoring  Memory: [] WFL  [] Mild  [] Moderate  [] Severe [] Unable to Assess  [x] Other: Limited assessment for Christian; concern for recall of new learning/working memory  Behavior: [x] Alert  [x] Cooperative  [x]  Pleasant  [] Confused  [] Agitated  [] Uncooperative  [x] Distractible [] Motivated  [] Self-Limiting [] Anxious  [] Other:  Endurance:  [x] Adequate for participation in SLP sessions  [] Reduced overall  [] Lethargic  [] Other:  Safety: [] No concerns at this time  [x] Reduced insight into deficits  []  Reduced safety awareness [] Not following call light procedures  [] Unable to Assess  [x] Other:self monitoring/carryover  Swallowing Precautions: Compensatory Swallowing Strategies: Upright as possible for all oral intake; Small bites/sips; Swallow 2 times per bite/sip; Remain upright for 30-45 minutes after meals     Barriers toward progress: none unless medical issues; memory for new learning/self monitoring and caregiver support           Date: 11/20/2021      Tx session 1 Tx session 2   Total Timed Code Min SLP Individual Minutes  Time In: 0845  Time Out: 0915  Minutes: 30  Coded treatment time 10  n/a     Group Treatment Minutes 0 0   Co-Treat Minutes 0 0   Variance/Reason:  n/a    Pain denied    Pain Intervention [] RN notified  [] Repositioned  [] Intervention offered and patient declined  [] N/A  [] Other: [] RN notified  [] Repositioned  [] Intervention offered and patient declined  [] N/A  [] Other:   Subjective     Pt seen bedside  Pt was in recliner, upright, consuming breakfast tray  Pt with voiced recognition of SLP therapy and recent MBSS. Pt with apparent improved recall of recommendations.    Verbal and visual review completed re: swallow CSTs via explanation/written cues and use of pictured stimuli    Objective:  Goals     Dysphagia Goals: Pt goal is to eat wihtout choking or it getting stuck Therapy working toward pt goal. Overall improvement with diet downgrade. RN reports good toleration. 1. Modified Goal  Pt will tolerate dysphagia puree food consistencies with mild thick liquids with compensatory swallow strategies Variety of puree (yogurt, pudding, cream of wheat) with clearance swallows between presentations: improving carryover. 2 episodes of throat clearing/cough for duration of meal, which pt did not sufficiently clear, resulting in delayed reflexive stronger cough x1 to clear presumed aspirate. Mildly thick: with clearance swallows between presentations: No s/s    2. Pt will demonstrate improved swallow response post tongue base retraction and laryngeal elevation ex and use of effortful swallow technique\" 10/10;     Dysphagia ex from previous session (laryngeal elevation via falsetto glottals; pitch swing low to high; 'effortful swallow' technique; yawn hold x 10 seconds) not targeted this session 2/2 focus on breakfast toleration    Noted lingual coordination deficits        3. Modified Goal   Pt will tolerate trials of minced and moist/mech soft  food consistencies OR thin liquids 10/10 without voert clinical s/s of aspiration  Not targeted      4. The patient/caregiver will demonstrate understanding of compensatory strategies for improved swallowing safety.  Min set up for rationale of clearance swallows between one presentation; written cues for reminders which pt did not need to reference for recall this date; and minimal review of pictured stimuli: min cues for execution within the session    Continues to require reminders to try not to talk when food in mouth        Other areas targeted: Cognition:   Pt verbalizing need for small sips, with min cues for consistency     Apparent recall and carryover from 11/19 with less external reminders    Ongoing ed in s/s of aspiration and cues for airway clearance strategies. Education:   Ongoing pt ed    Safety Devices: [x] Call light within reach  [x] Chair alarm activated  [] Bed alarm activated  [] Other: [] Call light within reach  [] Chair alarm activated  [] Bed alarm activated  [] Other:    Progress Assessment: 11/17/2021: Overall appears improved as compared to 11/16/2021 and diet modification appears to be a more functional one for pt. However; pt with 2 epiosdes of coughin/choking and both occurred when distracted. Discussed with MD and recommend MBSS to furtehr assess pahrygneal phase of the swallow  11/18/2021 MBSS Oropharyngeal Dysphagia characterized by prolonged, at times incomplete mastication; delayed initiation of swallow; reduced laryngeal elevation with reduced epiglottic distention and hyolaryngeal excursion and reduced pharyngeal peristalsis with delayed UES opening and reduced UES opening with potential CP dysfunction or post Surgery edema with anterior bulging which may be impacting laryngeal rom and UES opening. Please refer to Radiologist documentation. Pt with penetration before/during swallow of thin liquids. Pt with pyriform sinus residual post swallow all consistencies with penetration post swallow. Pt with sensation of deep penetration with cough/throat clearing. Post swallow food residue in the valleculae and the pyriform sinus with difficulty clearing and persistent penetration despite attempts post swallow. Head postures were not attempted due to post cervical surgery precautions/restrictions. Recommend further downgrade to dysphagia puree with mildly thick liquids with close monitor due to concern for poor pharyngeal clearance with penetration post swallow of residue and risk of aspiration. Pt does need to complete clearance swallows between presentations.  If s/s persist pt may require further downgrade FROM puree TO moderately thick liquids and continued mildly thick liquids. Crush all meds if able due to pharyngeal residue in the valleculae and pyriform sinus post swallow and risk of penetration/aspiration   11/18 SLP ed with RN; pt; pt's Sister chase; and discussed with MD (Dr. Olvin Mcelroy)   Plan: Continue as per plan of care.    Continued Tx Upon Discharge: ?    [x] Yes [] No [] TBD based on progress while on ARU [] Vital Stim indicated [] Other:   Estimated discharge date: TBD   Discharge recommendations:   [] Home independently  [x] Home with assistance []  24 hour supervision  [] ECF [] Other:     Additional information:     Interventions used during Rehab Stay:  [] Speech/Language Treatment  [] Instruction in HEP [] Group [x] Dysphagia Treatment [x] Cognitive Treatment  For dysphagia ex and for compensatory swallow strategies/self monitoring [] Other:        Electronically Signed by    Jazz Myrick MS, CCC-SLP #[id number]  Speech Language Pathologist

## 2021-11-20 NOTE — PROGRESS NOTES
family purchase bedrail to improve independence with bed mobility  Toilet - Technique: Ambulating  Equipment Used: Grab bars  Toilet Transfers Comments: RW, cues for hand placement  Assessment        SLP  Diet Solids Recommendation: Dysphagia Minced and Moist (Dysphagia II)  Liquid Consistency Recommendation: Mildly Thick (Nectar)    Body mass index is 30.44 kg/m². Assessment and Plan:    Cervical disc herniation with myelopathy  -s/p urgent C4-5 ACDF (11/10 with Dr. Coreen Parada)  -Wound care  -PT/OT    Dysphagia   -Suspect due to ACDF/intubation  -SLP consulted  [place] [place]. Significant prevertebral soft tissue swelling. Down graded to pureed + nectars.  ---D/w Nsgy team, do not feel benefits of steroid would outweigh potential risks. Cerebral palsy  -Baseline left hemiparesis  -PT/OT    HLD  -fenofibrate    Depression  -citalopram, duloxetine, risperidone    Bladder  -High risk retention  -Monitor PVRs, straight cath prn >300    Bowel  -High risk constipation  -senna+colace BID, prn miralax, MoM, bisacodyl supp    Pain control  -oxycodone prn, methocarbamol    DVT ppx  -None per Nsgy, SCDs    Rehab Progress: Making progress. Working on functional mobility, balance, compensatory strategies for ADLs, swallow. Anticipated Dispo: home with sister  Services:  PT, OT, SLP, RN  DME: [facility]  ELOS: 11/25      Electronically signed by CASTILLO Driscoll - CNP on 11/20/2021 at 9:50 AM     The patient was seen in conjunction with the nurse practitioner with independent history, evaluation and examination. I agree with the note which has been adjusted to reflect my findings. I have had face to face contact with the patient and performed a substantive portion of the E/M visit. In summary, patient doing well. No new concerns. Sydney Garrison.  Freya Toro MD 11/20/2021, 3:16 PM

## 2021-11-21 PROCEDURE — 6370000000 HC RX 637 (ALT 250 FOR IP): Performed by: PHYSICAL MEDICINE & REHABILITATION

## 2021-11-21 PROCEDURE — 94761 N-INVAS EAR/PLS OXIMETRY MLT: CPT

## 2021-11-21 PROCEDURE — 1280000000 HC REHAB R&B

## 2021-11-21 RX ADMIN — RISPERIDONE 1 MG: 1 TABLET ORAL at 08:26

## 2021-11-21 RX ADMIN — METHOCARBAMOL TABLETS 750 MG: 750 TABLET, COATED ORAL at 20:09

## 2021-11-21 RX ADMIN — CITALOPRAM HYDROBROMIDE 20 MG: 20 TABLET ORAL at 08:27

## 2021-11-21 RX ADMIN — RISPERIDONE 1 MG: 1 TABLET ORAL at 20:09

## 2021-11-21 RX ADMIN — MICONAZOLE NITRATE: 2 POWDER TOPICAL at 21:35

## 2021-11-21 RX ADMIN — METHOCARBAMOL TABLETS 750 MG: 750 TABLET, COATED ORAL at 08:23

## 2021-11-21 RX ADMIN — MICONAZOLE NITRATE: 2 POWDER TOPICAL at 09:56

## 2021-11-21 RX ADMIN — OXYBUTYNIN CHLORIDE 10 MG: 5 TABLET ORAL at 20:09

## 2021-11-21 RX ADMIN — DULOXETINE HYDROCHLORIDE 60 MG: 60 CAPSULE, DELAYED RELEASE ORAL at 08:22

## 2021-11-21 RX ADMIN — FENOFIBRATE 160 MG: 160 TABLET ORAL at 08:25

## 2021-11-21 RX ADMIN — METHOCARBAMOL TABLETS 750 MG: 750 TABLET, COATED ORAL at 13:41

## 2021-11-21 ASSESSMENT — PAIN SCALES - GENERAL
PAINLEVEL_OUTOF10: 0
PAINLEVEL_OUTOF10: 0

## 2021-11-21 NOTE — PROGRESS NOTES
Does not call to use bathroom, has been continent thus far today with bladder schedule. Denies pain. Doing well with thickened liquids.

## 2021-11-21 NOTE — PROGRESS NOTES
Patient admitted to rehab with cervical fusion C 4-5. A/Ox x4. Uses the call light appropriately. Transfers with a walker and gait belt x1 assist. Mobility restrictions: Lifting limit 8#, no overhead lifting. Limit to 40% ROM in neck or less. On dysphagia pureed diet with nectar thick liquids, tolerating well. Medications taken crushed with applesauce. Skin: Right anterior neck surgical incision EDDY is cdi. Abdominal panus, under breasts are moist  & red, micotin applied as ordered. She has been continent of bowel & bladder. LBM 11/20. Chair/bed alarms in use and call light is within reach. Will continue to monitor status and safety.

## 2021-11-21 NOTE — PROGRESS NOTES
Sister Bhavana Long here, (sister who she lives with). Did review diet, request to speak to speech therapy. Did voice concern, patient will be alone on Monday once she goes back to work, did encourage her to ask other family members to assist. Made aware of bladder schedule. Discharge planned for Thursday. Bhavana Long states she sets up pill box.

## 2021-11-21 NOTE — PLAN OF CARE
Delbert Nichole RN  Outcome: Ongoing  Note: Needs assist at times with edward care, did need assist with teds. 11/20/2021 2053 by Aileen Johnson RN  Outcome: Ongoing     Problem: Discharge Planning:  Goal: Patients continuum of care needs are met  Description: Patients continuum of care needs are met  11/21/2021 1042 by Mehreen Kramer RN  Outcome: Ongoing  Note: Discharge planned for this week, patient reports will be Thursday. 11/20/2021 2053 by Aileen Johnson RN  Outcome: Ongoing     Problem: IP BLADDER/VOIDING  Goal: LTG - patient will achieve acceptable level of continence  11/20/2021 2053 by Aileen Johnson RN  Outcome: Ongoing  Goal: STG - Patient demonstrates no accidents  11/21/2021 1042 by Mehreen Kramer RN  Outcome: Ongoing  Note: Continues to need bladder schedule. 11/20/2021 2053 by Aileen Johnson RN  Outcome: Ongoing     Problem: IP SWALLOWING  Goal: LTG - Patient will demonstrate safe swallowing Intervention/techniques  11/20/2021 2053 by Aileen Johnson RN  Outcome: Ongoing  Goal: STG - Patient will follow recommended swallowing strategies  11/21/2021 1042 by Mehreen Kramer RN  Outcome: Ongoing  Note: Following proper swallowing instructions. 11/20/2021 2053 by Aileen Johnson RN  Outcome: Ongoing     Problem: Nutrition  Intervention: Swallowing evaluation  Note: Reviewed diet, reminded patient will need assist with meals at home, purred and thick liquids, no family here yesterday to review.

## 2021-11-21 NOTE — PROGRESS NOTES
Medications reviewed, new medication muscle relaxant, meds crushed, no issues with current diet, did question who will assist with meal prep at home, stated she makes her own meals, agrees will need family to assist, no family here yesterday.

## 2021-11-22 LAB
ANION GAP SERPL CALCULATED.3IONS-SCNC: 11 MMOL/L (ref 3–16)
BASOPHILS ABSOLUTE: 0 K/UL (ref 0–0.2)
BASOPHILS RELATIVE PERCENT: 0.6 %
BUN BLDV-MCNC: 21 MG/DL (ref 7–20)
CALCIUM SERPL-MCNC: 9.6 MG/DL (ref 8.3–10.6)
CHLORIDE BLD-SCNC: 104 MMOL/L (ref 99–110)
CO2: 26 MMOL/L (ref 21–32)
CREAT SERPL-MCNC: 0.8 MG/DL (ref 0.6–1.2)
EOSINOPHILS ABSOLUTE: 0.2 K/UL (ref 0–0.6)
EOSINOPHILS RELATIVE PERCENT: 2.3 %
GFR AFRICAN AMERICAN: >60
GFR NON-AFRICAN AMERICAN: >60
GLUCOSE BLD-MCNC: 86 MG/DL (ref 70–99)
HCT VFR BLD CALC: 35.1 % (ref 36–48)
HEMOGLOBIN: 11.4 G/DL (ref 12–16)
LYMPHOCYTES ABSOLUTE: 2.4 K/UL (ref 1–5.1)
LYMPHOCYTES RELATIVE PERCENT: 32.4 %
MCH RBC QN AUTO: 29.4 PG (ref 26–34)
MCHC RBC AUTO-ENTMCNC: 32.5 G/DL (ref 31–36)
MCV RBC AUTO: 90.5 FL (ref 80–100)
MONOCYTES ABSOLUTE: 0.6 K/UL (ref 0–1.3)
MONOCYTES RELATIVE PERCENT: 8.6 %
NEUTROPHILS ABSOLUTE: 4.1 K/UL (ref 1.7–7.7)
NEUTROPHILS RELATIVE PERCENT: 56.1 %
PDW BLD-RTO: 13.4 % (ref 12.4–15.4)
PLATELET # BLD: 385 K/UL (ref 135–450)
PMV BLD AUTO: 7.5 FL (ref 5–10.5)
POTASSIUM REFLEX MAGNESIUM: 4.2 MMOL/L (ref 3.5–5.1)
RBC # BLD: 3.88 M/UL (ref 4–5.2)
SODIUM BLD-SCNC: 141 MMOL/L (ref 136–145)
WBC # BLD: 7.4 K/UL (ref 4–11)

## 2021-11-22 PROCEDURE — 6370000000 HC RX 637 (ALT 250 FOR IP): Performed by: PHYSICAL MEDICINE & REHABILITATION

## 2021-11-22 PROCEDURE — 97110 THERAPEUTIC EXERCISES: CPT

## 2021-11-22 PROCEDURE — 80048 BASIC METABOLIC PNL TOTAL CA: CPT

## 2021-11-22 PROCEDURE — 97535 SELF CARE MNGMENT TRAINING: CPT

## 2021-11-22 PROCEDURE — 97530 THERAPEUTIC ACTIVITIES: CPT

## 2021-11-22 PROCEDURE — 1280000000 HC REHAB R&B

## 2021-11-22 PROCEDURE — 36415 COLL VENOUS BLD VENIPUNCTURE: CPT

## 2021-11-22 PROCEDURE — 97129 THER IVNTJ 1ST 15 MIN: CPT

## 2021-11-22 PROCEDURE — 97116 GAIT TRAINING THERAPY: CPT

## 2021-11-22 PROCEDURE — 85025 COMPLETE CBC W/AUTO DIFF WBC: CPT

## 2021-11-22 PROCEDURE — 92526 ORAL FUNCTION THERAPY: CPT

## 2021-11-22 RX ADMIN — SENNOSIDES AND DOCUSATE SODIUM 2 TABLET: 50; 8.6 TABLET ORAL at 20:07

## 2021-11-22 RX ADMIN — METHOCARBAMOL TABLETS 750 MG: 750 TABLET, COATED ORAL at 07:45

## 2021-11-22 RX ADMIN — METHOCARBAMOL TABLETS 750 MG: 750 TABLET, COATED ORAL at 20:07

## 2021-11-22 RX ADMIN — RISPERIDONE 1 MG: 1 TABLET ORAL at 07:45

## 2021-11-22 RX ADMIN — OXYBUTYNIN CHLORIDE 10 MG: 5 TABLET ORAL at 20:07

## 2021-11-22 RX ADMIN — MICONAZOLE NITRATE: 2 POWDER TOPICAL at 20:15

## 2021-11-22 RX ADMIN — FENOFIBRATE 160 MG: 160 TABLET ORAL at 07:45

## 2021-11-22 RX ADMIN — CITALOPRAM HYDROBROMIDE 20 MG: 20 TABLET ORAL at 07:45

## 2021-11-22 RX ADMIN — MICONAZOLE NITRATE: 2 POWDER TOPICAL at 07:45

## 2021-11-22 RX ADMIN — DULOXETINE HYDROCHLORIDE 60 MG: 60 CAPSULE, DELAYED RELEASE ORAL at 07:45

## 2021-11-22 RX ADMIN — RISPERIDONE 1 MG: 1 TABLET ORAL at 20:07

## 2021-11-22 RX ADMIN — METHOCARBAMOL TABLETS 750 MG: 750 TABLET, COATED ORAL at 13:01

## 2021-11-22 ASSESSMENT — PAIN SCALES - WONG BAKER
WONGBAKER_NUMERICALRESPONSE: 0
WONGBAKER_NUMERICALRESPONSE: 0

## 2021-11-22 ASSESSMENT — PAIN SCALES - GENERAL
PAINLEVEL_OUTOF10: 0
PAINLEVEL_OUTOF10: 0

## 2021-11-22 NOTE — PROGRESS NOTES
Occupational Therapy  Facility/Department: [address] IP REHAB  Daily Treatment Note  NAME: Yunier Hernandez  : 1950  MRN: [id number]    Date of Service: 2021    Discharge Recommendations:  Home with assist PRN, Patient would benefit from continued therapy after discharge  OT Equipment Recommendations  ADL Assistive Devices: Reacher; Sock-Aid Hard; Long-handled Shoe Horn  Other: Reports she has a BSC (may not need toilet safety frames if can use BSC over toilet during the day and by bed at night), has shower chair in basement shower, RW, tray, 4WW, w/c    Assessment   Performance deficits / Impairments: Decreased functional mobility ; Decreased strength; Decreased ADL status; Decreased safe awareness; Decreased endurance; Decreased balance; Decreased high-level IADLs  Assessment: Pt completing ADL's today with up to Min A cues and increased time, for LB, using AE with cues. Pt SBA for transfers with consistent cueing needed for safe hand placement. Pt SBA for functional mob w/RW. Pt tolerated session well and is making gradual progress. Cont poc to maximize function for return home. Treatment Diagnosis: decreased: ADLs, fxl transfers/mobility, IADLs  Prognosis: Good  History: 74 yo F w/ PMHx CP who lives in a 2 story home w/ her sister and NICK. House has 1 DAKOTA to enter and 1 flight of steps to pt's bedroom and full bathroom. Pt has been sleeping in a recliner chair on main level, which only has a half bath. She was previously independent, her sister works during the day. Pt has RW, w/c, shower chair, and GBs  OT Education: Plan of Care; OT Role; Transfer Training; ADL Adaptive Strategies; Precautions; Energy Conservation; Equipment  REQUIRES OT FOLLOW UP: Yes  Safety Devices  Safety Devices in place: Yes  Type of devices: Call light within reach; Left in chair; Chair alarm in place; Gait belt         Patient Diagnosis(es): There were no encounter diagnoses.       has a past medical history of Depression, Hyperlipidemia, and Hypertension. has a past surgical history that includes cervical fusion (N/A, 11/10/2021). Restrictions  Restrictions/Precautions  Restrictions/Precautions: Fall Risk  Position Activity Restriction  Other position/activity restrictions: ACDF precautions (no excessive cervical motion), Hx of CP;  11-16-21: Diet: Dysphagia-minced and moist; liquids: mildly thick (nectar). Subjective   General  Chart Reviewed: Yes, Progress Notes  Patient assessed for rehabilitation services?: Yes  Additional Pertinent Hx: per H&P: \"73year-old female patient with cerebral palsy with recent history of increasing debility, inability to walk, generalized weakness. Patient states that she was not able to perform her ADLs, use her walker and her sister is not able to help her any longer as well. Work-up revealed the patient had severe cervical spinal cord compression due to large disc herniation and osteophytes at the C4-5 level. Patient was taken to surgery on 11/10 where she underwent anterior cervical discectomy with fusion and fixation of C4-5 per Dr. Scout Givens. Patient started in therapies today. Patient lives at home with her sister in a two-level house. \"  Family / Caregiver Present: No  Referring Practitioner: Jose  Diagnosis: ANTERIOR CERVICAL DISCECTOMY WITH FUSION, WITH INTERBODY IMPLANT AND WITH PLATE AND SCREW FIXATION C4-5 (N/A Spine Cervical)  Subjective  Subjective: Pt seen BS for ADL's. Pt without complaints, agreeable to tx. Orientation  Orientation  Overall Orientation Status: Within Functional Limits  Objective    ADL  Equipment Provided: Reacher; Sock aid; Long-handled shoe horn; Long-handled sponge  Grooming: Setup (seated from chair at sink for hair, oral care)  UE Bathing: Stand by assistance  LE Bathing: Minimal assistance (used LHS for feet. Stance holding grab bars for buttocks. Assist to dry feet, buttocks)  UE Dressing: Setup; Minimal assistance; Verbal cueing;  Increased time to complete (for bra hooks. Pt donned t-shirt overhead w/effort, increased time)  LE Dressing: Setup; Minimal assistance; Verbal cueing; Increased time to complete (used reacher to don clothes w/less cueing. Assist o don sabrina hose(able to apply to sock aid, pull onto feet; assist to pull up legs). Used LHSH to don slippers)  Toileting: Stand by assistance (voided urine,BM. Doffed clothes using reacher,for upcoming shower)  Additional Comments: Pt showered from chair. Towel on floor for non-skid surface. Standing Balance  Activity: ADL's, w/SBA; functional mob with RW  Toilet Transfers  Toilet - Technique: Ambulating  Equipment Used: Grab bars  Toilet Transfer: Stand by assistance  Toilet Transfers Comments: RW, cues for hand placement  Shower Transfers  Shower - Transfer From: Jd Grippe - Transfer Type: To and From  Shower - Transfer To:  Shower seat with back  Shower - Technique: Ambulating  Shower Transfers: Stand by assistance  Shower Transfers Comments: RW, cues for safe hand placement         Cognition  Overall Cognitive Status: Exceptions  Memory: Decreased short term memory         Plan   Plan  Times per week: 5-6  Times per day: Twice a day  Plan weeks: 1.5-2  Current Treatment Recommendations: Strengthening, Functional Mobility Training, Gait Training, Endurance Training, Balance Training, ROM, Self-Care / ADL, Patient/Caregiver Education & Training, Safety Education & Training       Goals  Short term goals  Time Frame for Short term goals: 1 week  Short term goal 1: Pt will complete ADL transfers w/ SBA  Short term goal 2: Pt will toilet w/ min A  Short term goal 3: Pt will feed and groom mod I  Short term goal 4: Pt will bathe w/ min A using AE prn  Short term goal 5: Pt will dress w/ min A using AE prn  Long term goals  Time Frame for Long term goals : 10-14 days  Long term goal 1: Pt will complete ADL transfers mod I  Long term goal 2: Pt will toilet mod I  Long term goal 3: Pt will bathe mod I  Long term goal 4: Pt will dress mod I (may need assist with TEDs)  Long term goal 5: Pt will complete microwave meal prep task mod I  Patient Goals   Patient goals : \"be able to get around, get my own shower, make a simple breakfast\"       Therapy Time   Individual Concurrent Group Co-treatment   Time In 0815         Time Out 0930         Minutes 75                 Carlos DOWD/АННА,[id number]

## 2021-11-22 NOTE — PROGRESS NOTES
Patient admitted to rehab with cervical fusion C 4-5.  A/Ox x4. Uses the call light appropriately. Denies pain. Transfers with a walker and gait belt x1 assist. Mobility restrictions: Lifting limit 8#, no overhead lifting.  Limit to 40% ROM in neck or less. On dysphagia pureed diet with nectar thick liquids, tolerating well. Medications taken crushed with applesauce.  Skin: Right anterior neck surgical incision EDDY is cdi. Abdominal panus, under breasts are moist  & red, micotin applied as ordered. Georgina Beti has been continent of bowel & bladder. LBM 11/20. Chair/bed alarms in use and call light is within reach. Will continue to monitor status and safety.

## 2021-11-22 NOTE — PROGRESS NOTES
Speech Language Pathology  ACUTE REHAB UNIT  SPEECH/LANGUAGE PATHOLOGY      [x] Daily  [] Weekly Care Conference Note  [] Discharge    Merna Ireland      MARSHALL:[birth year]  JXT:[id number]  Rehab Dx/Hx: Stenosis of cervical spine with myelopathy ([facility] [address] [M48.02, G99.2]    Precautions: FAll risk; Post Cervical spine Surgery; Dysphagia; documented HX of CP  Home situation: Lives with family  ST Dx: [] Aphasia  [] Dysarthria  [] Apraxia   [] Oropharyngeal dysphagia [] Cognitive   Impairment  [] Other:   Initial Speech Therapy Dysphagia Assessment (CHRISTIAN)Diagnosis:   1. Dysphagia Diagnosis: Moderate Oropharyngeal Dysphagia  characterized by reduced mastication ( which is further impacted by loose upper denture); reduced lingual coordination for bolus control/bolus formation and A-P oral transit; delayed initiation of swallow and concern for reduced laryngeal elevation and reduced pharyngeal clearance. Pt with overt clinical s/s with concern for pharyngeal pooling and aspiration with thin liquids; textured soft food consistencies/meats. Pt appeared to better tolerate mildly thick liquids; moderately thick liquids; puree; and minced and moist food consistencies as evidenced by no complaints and no overt clinical s/s post swallow. Plan to downgrade diet to mildly thick liquids with minced and moist food consistencies. Will discuss with MD regarding MBSS to further assess if s/s persist.Discussed with pt and RN. Pt was agreeable to trial downgrade. Pt self reports some history of acid reflux/indigestion. Date of Admit: 11/12/2021  Room #: N4O-8348/3262-01  Date: 11/22/2021       Current functional status (updated daily):         Current Diet Order:ADULT DIET; Dysphagia - Pureed; Mildly Thick (Nectar)   Communication: []WFL  [] Aphasia  [] Dysarthria  [] Apraxia  [] Pragmatic Impairment [] Non-verbal  [] Hearing Loss  [x] Other: Limited assessment for CHRISTIAN.  Functional for expressing needs/wants; follow simple cues     Objective:  Goals     Dysphagia Goals: Pt goal is to eat wihtout choking or it getting stuck Therapy working toward pt goal. Overall improvement with diet downgrade. Pt reports good toleration, no coughing spells this AM.    1. Modified Goal  Pt will tolerate dysphagia puree food consistencies with mild thick liquids with compensatory swallow strategies Not targeted with puree/mild thick this session; pt reports feeling that swallow function is improving. 2. Pt will demonstrate improved swallow response post tongue base retraction and laryngeal elevation ex and use of effortful swallow technique\" 10/10;     Dysphagia ex completed 10 reps each, and written notes provided and reviewed for room practice (yawn hold x 10 seconds; falsetto sustained highest note; effortful swallow; glottals; TBR 3 sets)     Noted lingual coordination deficits        3. Modified Goal   Pt will tolerate trials of minced and moist/mech soft  food consistencies OR thin liquids 10/10 without overt clinical s/s of aspiration  Trials of thin water via cup: 10/10 small sips and dry swallow: no cough, throat clear or VQ change      4. The patient/caregiver will demonstrate understanding of compensatory strategies for improved swallowing safety. Set up for rationale of clearance swallows between one presentation; written cues for reminders which pt did not need to reference for recall this date; min cues for execution within the session        Other areas targeted: Cognition:   Pt verbalizing need for small sips, with min cues for consistency     Apparent recall and carryover from 11/20 with vanishing cues    Ongoing ed in s/s of aspiration and cues for airway clearance strategies.          Education:   Ongoing pt ed    Safety Devices: [] Call light within reach  [] Chair alarm activated  [] Bed alarm activated  [x] Other: transport returned to room [] Call light within reach  [] Chair alarm activated  [] Bed alarm activated  [] Other:    Progress Assessment: 11/17/2021: Overall appears improved as compared to 11/16/2021 and diet modification appears to be a more functional one for pt. However; pt with 2 epiosdes of coughin/choking and both occurred when distracted. Discussed with MD and recommend MBSS to furtehr assess pahrygneal phase of the swallow  11/18/2021 MBSS Oropharyngeal Dysphagia characterized by prolonged, at times incomplete mastication; delayed initiation of swallow; reduced laryngeal elevation with reduced epiglottic distention and hyolaryngeal excursion and reduced pharyngeal peristalsis with delayed UES opening and reduced UES opening with potential CP dysfunction or post Surgery edema with anterior bulging which may be impacting laryngeal rom and UES opening. Please refer to Radiologist documentation. Pt with penetration before/during swallow of thin liquids. Pt with pyriform sinus residual post swallow all consistencies with penetration post swallow. Pt with sensation of deep penetration with cough/throat clearing. Post swallow food residue in the valleculae and the pyriform sinus with difficulty clearing and persistent penetration despite attempts post swallow. Head postures were not attempted due to post cervical surgery precautions/restrictions. Recommend further downgrade to dysphagia puree with mildly thick liquids with close monitor due to concern for poor pharyngeal clearance with penetration post swallow of residue and risk of aspiration. Pt does need to complete clearance swallows between presentations. If s/s persist pt may require further downgrade FROM puree TO moderately thick liquids and continued mildly thick liquids. Crush all meds if able due to pharyngeal residue in the valleculae and pyriform sinus post swallow and risk of penetration/aspiration   11/18 SLP ed with RN; pt; pt's Sister chase; and discussed with MD (Dr. America James)   Plan: Continue as per plan of care. Continued Tx Upon Discharge: ? [x] Yes [] No [] TBD based on progress while on ARU [] Vital Stim indicated [] Other:   Estimated discharge date: TBD   Discharge recommendations:   [] Home independently  [x] Home with assistance []  24 hour supervision  [] ECF [] Other:     Additional information:     Interventions used during Rehab Stay:  [] Speech/Language Treatment  [] Instruction in HEP [] Group [x] Dysphagia Treatment [x] Cognitive Treatment  For dysphagia ex and for compensatory swallow strategies/self monitoring [] Other:        Electronically Signed by    Veronica Greene MS, CCC-SLP #[id number]  Speech Language Pathologist

## 2021-11-22 NOTE — PROGRESS NOTES
Department of Physical Medicine & Rehabilitation  Progress Note    Patient Identification:  Marichuy Ko  [id number]  : 1950  Admit date: 2021    Chief Complaint: Stenosis of cervical spine with myelopathy ([address]    Subjective:   No acute events overnight. Patient seen this am sitting up in room. She reports feeing well. Pain controlled. Asks appropriate questions about dc planning. Labs reviewed. ROS: No f/c, n/v, cp     Objective:  Patient Vitals for the past 24 hrs:   BP Temp Temp src Pulse Resp SpO2 Weight   21 0745 99/63 97.3 °F (36.3 °C) Oral 71 16 96 % --   21 0430 117/72 97.7 °F (36.5 °C) Oral 68 16 95 % 154 lb 1.6 oz (69.9 kg)   21 2000 123/73 97.9 °F (36.6 °C) Oral 77 18 97 % --   21 1411 106/68 97.7 °F (36.5 °C) Oral 76 16 97 % --     Const: Alert. No distress, pleasant. HEENT: Normocephalic, atraumatic. Normal sclera/conjunctiva. MMM. +Strabismus. CV: Regular rate and rhythm. Resp: No respiratory distress. Lungs CTAB. Abd: Soft, nontender, nondistended, NABS+   Ext: No edema. Neuro: Alert, oriented. Left hemiparesis. Psych: Cooperative, appropriate mood and affect    Laboratory data: Available via EMR.    Last 24 hour lab  Recent Results (from the past 24 hour(s))   CBC Auto Differential    Collection Time: 21  7:07 AM   Result Value Ref Range    WBC 7.4 4.0 - 11.0 K/uL    RBC 3.88 (L) 4.00 - 5.20 M/uL    Hemoglobin 11.4 (L) 12.0 - 16.0 g/dL    Hematocrit 35.1 (L) 36.0 - 48.0 %    MCV 90.5 80.0 - 100.0 fL    MCH 29.4 26.0 - 34.0 pg    MCHC 32.5 31.0 - 36.0 g/dL    RDW 13.4 12.4 - 15.4 %    Platelets 254 753 - 306 K/uL    MPV 7.5 5.0 - 10.5 fL    Neutrophils % 56.1 %    Lymphocytes % 32.4 %    Monocytes % 8.6 %    Eosinophils % 2.3 %    Basophils % 0.6 %    Neutrophils Absolute 4.1 1.7 - 7.7 K/uL    Lymphocytes Absolute 2.4 1.0 - 5.1 K/uL    Monocytes Absolute 0.6 0.0 - 1.3 K/uL    Eosinophils Absolute 0.2 0.0 - 0.6 K/uL    Basophils Absolute 0.0 0.0 - 0.2 K/uL   Basic Metabolic Panel w/ Reflex to MG    Collection Time: 11/22/21  7:07 AM   Result Value Ref Range    Sodium 141 136 - 145 mmol/L    Potassium reflex Magnesium 4.2 3.5 - 5.1 mmol/L    Chloride 104 99 - 110 mmol/L    CO2 26 21 - 32 mmol/L    Anion Gap 11 3 - 16    Glucose 86 70 - 99 mg/dL    BUN 21 (H) 7 - 20 mg/dL    CREATININE 0.8 0.6 - 1.2 mg/dL    GFR Non-African American >60 >60    GFR African American >60 >60    Calcium 9.6 8.3 - 10.6 mg/dL       Therapy progress:  PT  Position Activity Restriction  Other position/activity restrictions: ACDF precautions (no excessive cervical motion), Hx of CP;  11-16-21: Diet: Dysphagia-minced and moist; liquids: mildly thick (nectar). Objective     Sit to Stand: Supervision  Stand to sit: Supervision  Bed to Chair: Supervision (with wheeled walker)  Device: [employer] [address]: Supervision  Distance: 72' with 2 turns, 25' to and from stairs, 25' to and from curb step  OT  PT Equipment Recommendations  Equipment Needed: No  Other: owns wheeled walker, may have family purchase bedrail to improve independence with bed mobility  Toilet - Technique: Ambulating  Equipment Used: Grab bars  Toilet Transfers Comments: RW, cues for hand placement  Assessment        SLP  Diet Solids Recommendation: Dysphagia Minced and Moist (Dysphagia II)  Liquid Consistency Recommendation: Mildly Thick (Nectar)    Body mass index is 30.1 kg/m². Assessment and Plan:    Cervical disc herniation with myelopathy  -s/p urgent C4-5 ACDF (11/10 with Dr. Jackie Arvizu)  -Wound care  -PT/OT    Dysphagia   -Suspect due to ACDF/intubation  -SLP consulted  -[facility] (11/18) - D/w SLP. Significant prevertebral soft tissue swelling. Down graded to pureed + nectars.  ---D/w Nsgy team, do not feel benefits of steroid would outweigh potential risks.      Cerebral palsy  -Baseline left hemiparesis  -PT/OT    HLD  -fenofibrate    Depression  -citalopram, duloxetine, risperidone    Bladder  -High risk retention  -Monitor PVRs, straight cath prn >300    Bowel  -High risk constipation  -senna+colace BID, prn miralax, MoM, bisacodyl supp    Pain control  -oxycodone prn, methocarbamol    DVT ppx  -None per Nsgy, SCDs    Rehab Progress: Making progress. Working on functional mobility, balance, compensatory strategies for ADLs, swallow.   Anticipated Dispo: home with sister  Services:  PT, OT, SLP, RN  DME: [facility]  ELOS: 11/25      Electronically signed by Reagan Valadez MD on 11/22/2021 at 11:07 AM

## 2021-11-22 NOTE — PROGRESS NOTES
Patient admitted to rehab with cervical fusion C 4-5. A/Ox4. Transfers with one assist with gait belt and front wheeled walker. Mobility restrictions: lifting 8#, no overhead lifting. Limit to 40% ROM in neck or less. On dysphagia pureed with nectar thick liquid diet, tolerating well. Medications taken crushed in applesauce. On TIGIST hose for DVT prophylaxis. Skin: pink under breast and abdmenal panus. Oxygen: room air. Has been continent  bladder. LBM 11/21. Chair/bed alarms in use and call light in reach. Will monitor status and safety.

## 2021-11-22 NOTE — PROGRESS NOTES
Occupational Therapy  OCCUPATIONAL THERAPY  Progress Note   Second Session    Patient Name: Shanda Shell. Record Number: [id number]    Treatment Diagnosis: impaired mobility    General  Chart Reviewed: Yes, Progress Notes  Patient assessed for rehabilitation services?: Yes  Additional Pertinent Hx: per H&P: \"73year-old female patient with cerebral palsy with recent history of increasing debility, inability to walk, generalized weakness. Patient states that she was not able to perform her ADLs, use her walker and her sister is not able to help her any longer as well. Work-up revealed the patient had severe cervical spinal cord compression due to large disc herniation and osteophytes at the C4-5 level. Patient was taken to surgery on 11/10 where she underwent anterior cervical discectomy with fusion and fixation of C4-5 per Dr. Mary El. Patient started in therapies today. Patient lives at home with her sister in a two-level house. \"  Family / Caregiver Present: No  Referring Practitioner: Heis  Diagnosis: ANTERIOR CERVICAL DISCECTOMY WITH FUSION, WITH INTERBODY IMPLANT AND WITH PLATE AND SCREW FIXATION C4-5 (N/A Spine Cervical)     Restrictions/Precautions  Restrictions/Precautions: Fall Risk        Position Activity Restriction  Other position/activity restrictions: ACDF precautions (no excessive cervical motion), Hx of CP;  11-16-21: Diet: Dysphagia-minced and moist; liquids: mildly thick (nectar). Subjective: Patient continues to report no pain at this time.   She states she is excited at prospect of going home on Thanksgiving (has supportive sister & brother in law)    Objective: agreeable for LB dressing, t/f, fxl mob, using reacher + walker basket    Assessment: Pt completing doff/donning of slippers using A/E--she is able to kick off her slippers but needed up to min A to don despite trying to use reacher & LH shoe horn; explored other shoe options on internet including [address] [place] [facility] for slip on gym shoes; has foot length discrepancies, L foot is 1/2 size smaller than R (pt has CP). She was instructed how to use reacher & basket to retrieve items off floor, completed w/ close SB/CGA d/t L foot drag (d/t CP). She wanted to practice bed mobility using 1/2 bed rail--pt given cues to scoot back prior to sit<supine, able to roll side<>side by bringing knees up & allowing them to fall to side while reaching over w/ arm. She completed supine<sit w/ SBA using rail. Pt completed fxl mobility using RW w/ close SB to CGA d/t limping thru L side; tactile cues to lift from L hip & to keep RW closer to her. Will cont to practice to master skill. Pt tolerated session well and is making gradual progress. Cont poc to maximize function for return home.     Safety Device - Type of devices:  []  All fall risk precautions in place [] Bed alarm in place  [] Call light within reach [] Chair alarm in place [] Positioning belt [x] Gait belt [] Patient at risk for falls [] Left in bed [] Left in chair [] Telesitter in use [] Sitter present [] Nurse notified []  None      Therapy Time   Individual Co-treatment   Time In 1520     Time Out 1600     Minutes 40       Electronically signed by Juan Younger OT on 11/22/2021 at 3:28 PM

## 2021-11-22 NOTE — PLAN OF CARE
Problem: Falls - Risk of:  Goal: Will remain free from falls  Description: Will remain free from falls  Outcome: Ongoing  Note: Pt is at risk for falls. Call light in reach. Bed in low position. Alarm on. Nonskid footwear on. Possessions in reach. Gait belt with front wheeled walker for ambulation     Problem: Skin Integrity:  Goal: Will show no infection signs and symptoms  Description: Will show no infection signs and symptoms  Outcome: Ongoing  Note: Pt is at risk for impaired skin integrity. Assess skin every shift and prn. Turn every 2 hours. Keep heels off bed. Keep skin clean and dry. Micotin powder as ordered. Problem: Infection - Surgical Site:  Goal: Will show no infection signs and symptoms  Description: Will show no infection signs and symptoms  Outcome: Ongoing  Note: Pt is at risk for impaired skin integrity. Assess skin every shift and prn. Turn every 2 hours. Keep heels off bed. Keep skin clean and dry. Micotin powder as ordered.

## 2021-11-22 NOTE — PROGRESS NOTES
Physical Therapy  Facility/Department: [address] REHAB  Daily Treatment Note  NAME: Jackson Sherman  : 1950  MRN: [id number]    Date of Service: 2021    Discharge Recommendations:  Continue to assess pending progress, Patient would benefit from continued therapy after discharge   PT Equipment Recommendations  Equipment Needed: No  Other: owns wheeled walker, may have family purchase bedrail to improve independence with bed mobility    Assessment   Body structures, Functions, Activity limitations: Decreased functional mobility ; Decreased strength; Decreased safe awareness; Decreased endurance; Decreased balance; Decreased posture  Assessment: Ms. Criselda Penn continues to demonstrate improving strength and endurance. She is gradually increasing distance with wheeled walker and is now supervision. During ambulation, she is improving clearance of bilateral LE, L more so than R, but is still limited and reduces with fatigue. She responds well to verbal cueing and has improved foot clearance with shoes on. Patient is improving with stairs and she was able to ascend/descend 12 steps using bilateral hands on 1 rail to simulate home environment. Patient continues to be below baseline and will benefit from continued skilled therapy for strengthening, gait training, and functional mobility training to allow for return home. Treatment Diagnosis: impaired mobility  Specific instructions for Next Treatment: progress gait  Prognosis: Fair  Decision Making: Medium Complexity  PT Education: PT Role; Plan of Care; General Safety; Functional Mobility Training; Transfer Training; Gait Training; Precautions; Adaptive Device Training; Energy Conservation  Patient Education: safety on stairs, progress since start of Rehab  REQUIRES PT FOLLOW UP: Yes  Activity Tolerance  Activity Tolerance: Patient Tolerated treatment well; Patient limited by endurance     Patient Diagnosis(es): There were no encounter diagnoses.      has a past medical history of Depression, Hyperlipidemia, and Hypertension. has a past surgical history that includes cervical fusion (N/A, 11/10/2021). Restrictions  Restrictions/Precautions  Restrictions/Precautions: Fall Risk  Position Activity Restriction  Other position/activity restrictions: ACDF precautions (no excessive cervical motion), Hx of CP;  11-16-21: Diet: Dysphagia-minced and moist; liquids: mildly thick (nectar). Social/Functional History  Lives With:  (sister and NICK)  Type of Home: House  Home Layout: Two level, Bed/Bath upstairs, 1/2 bath on main level (2 + basement. Pt has been sleeping in a lounge chair on the first floor)  Home Access: Stairs to enter without rails  Entrance Stairs - Number of Steps: 1 thru garage + flight upstairs to bedroom with one rail  Bathroom Shower/Tub: Walk-in shower, Shower chair with back (showers in basement)  Bathroom Toilet: Standard (uses towel bar to stand sometimes)  Bathroom Equipment: Shower chair, 3-in-1 commode  Bathroom Accessibility: Accessible  Home Equipment: Rolling walker, Wheelchair-manual, 4 wheeled walker (walker tray)  ADL Assistance: Independent  Homemaking Assistance:  (microwave meals, family assist with heaving cleaning and laundry)  Ambulation Assistance: Independent (RW in home, w/c in community (sister pushes w/c))  Transfer Assistance: Independent  Active : No  Patient's  Info: Sister drives  Type of occupation: worked for [employer] [address]: Reading, word search puzzles  Additional Comments: Sister works during the day and pt home alone. Pt denies falls. Patient reports she has a regular wheeled walker on each floor of the house. The four wheeled walker is only used when out with her sister. Subjective   General  Chart Reviewed: Yes  Additional Pertinent Hx: Pt is a 73 y.o. female with hx of CP who presented to the ED on 11/7/21 with progressive generalized weakness and debility. Per Dr. Ramses Cervantes H&P, \"73year-old female patient with cerebral palsy with recent history of increasing debility, inability to walk, generalized weakness. Patient states that she was not able to perform her ADLs, use her walker and her sister is not able to help her any longer as well. Work-up revealed the patient had severe cervical spinal cord compression due to large disc herniation and osteophytes at the C4-5 level. Patient was taken to surgery on 11/10 where she underwent anterior cervical discectomy with fusion and fixation of C4-5 per Dr. Anival Diana. Response To Previous Treatment: Patient with no complaints from previous session. Family / Caregiver Present: No  Referring Practitioner: Dr. Cintron Haris: Patient continues to report no pain at this time. She states she is excited at prospect of going home this week. General Comment  Comments: Patient goes by Santy Alfonso. Objective      Transfers  Sit to Stand: Supervision  Stand to sit: Supervision  Bed to Chair: Supervision (with wheeled walker)     Ambulation  Ambulation?: Yes  More Ambulation?: No  Ambulation 1  Surface: level tile  Device: Rolling Walker  Assistance: Supervision  Quality of Gait: step through pattern with reduced step length bilaterally, reduced bilateral foot clearance but is able to clear in swing phase (reduces with increased fatigue). Internal rotation of Left LE (L>R), which is chronic in nature  Gait Deviations: Slow Patsy; Decreased step length; Decreased step height  Distance: 72' with 2 turns, 22' to and from stairs, 25' to and from curb step  Comments: no LOB. Fatigues with multiple bouts of ambulation and foot clearance reducing. Stairs/Curb  Stairs?: Yes  Stairs  # Steps : 12  Stairs Height: 6\"  Rails: Right ascending  Device: No Device  Assistance: Supervision  Comment: Patient used bilateral hands on right rail ascending with non-reciprocal pattern.  She used slow pace but demonstrated improved ability to lift left LE up onto each step. She has difficulty positioning her feet on each step and typically needs to reposition the feet before ascending or descending the next step. Curbs: 6\" (with wheeled walker. Patient required cues for sequence and to ensure she stands upright before advancing the walker up or down the step. She requires assist to advance walker (min assist), but supervision for balance)             Exercises  Hamstring Sets: x15 hamstring curls without resistance. Reduced active knee flexion in left knee. Hip Flexion: x10 marching in standing with bilateral UE support on walker. PT stabilizing walker, but patient was supervision for balance  Hip Abduction: x15 hip add set with 3 second hold followed by hip abduction without resistance (bilateral LE in sitting). Patient with limited left hip abduction and encouraged to focus on ROM at left hip with each repetition. Knee Long Arc Quad: x20 bilateral LE while seated in wheelchair  Ankle Pumps: x20 while seated, within available limits, significantly limited in L ankle due to CP     PM Session  Patient seated in wheelchair upon arrival and she has no complaints of pain. Sit<>stand with Supervision. Ambulated 54' with wheeled walker with supervision. She required cueing to increase left step height  sit>supine with SBA with difficulty. Patient performed 15 reps bilateral LE ther ex while supine on therapy mat: APs, (limited on left secondary to limitations from CP), hip abduction, heel slides, QS with three second hold, hip add sets with three second hold, SAQ with blue bolster under knees, hamstring sets with blue bolster under knees, and 10 reps SLR with cueing to maintain knee extension. Supine>sit with SBA and increased difficulty. She required use of railing to successfully achieve upright. Sit<>Stand with supervision. Ambulated another 61' with wheeled walker with supervision.  She required cueing to recall where wheelchair was left and how to get back to it. Short seated break required. Sit<>stand with supervision. Performed 12 reps marching with bilateral UE support on walker. Cueing for increasing hip flexion. Patient improving with hip flexion mobility, allowing for improving gait pattern. She does continue to be limited with endurance.    Safety Device - Type of devices:  [x]  All fall risk precautions in place [] Bed alarm in place  [] Call light within reach [] Chair alarm in place [] Positioning belt [x] Gait belt [] Patient at risk for falls [] Left in bed [x] Left in chair (in wheelchair to return to room with transportation) [] Telesitter in use [] Sitter present [] Nurse notified []  None           Goals  Short term goals  Time Frame for Short term goals: 1 week  Short term goal 1: bed mobility at minimal assist  Short term goal 2: transfers at SBA/CGA - met - 2021  Short term goal 3: ambulation 48' with wheeled walker with CGA - met   Short term goal 4: perform curb step ascen/descend with CGA/minimal assist  Short term goal 5: perform 8 steps with CGA with rail - met   Long term goals  Time Frame for Long term goals : 1.5 to 2 weeks  Long term goal 1: transfer with MI  Long term goal 2: bed mobility with MI  Long term goal 3: Patient ambulate [place] 1560' with MI  Long term goal 4: Perform 12 steps with rail and SBA  Long term goal 5: perform curb step with SBA  Patient Goals   Patient goals : wants to get home again and be able to walk to make simple meal    Plan    Plan  Times per week: 5-6 x/week  Times per day: Twice a day  Specific instructions for Next Treatment: progress gait  Current Treatment Recommendations: Strengthening, ADL/Self-care Training, ROM, Functional Mobility Training, Transfer Training, Gait Training, Patient/Caregiver Education & Training, Safety Education & Training, Positioning, Balance Training, Endurance Training, Stair training, Home Exercise Program, Equipment Evaluation, Education, & procurement  Safety Devices  Type of devices:  All fall risk precautions in place, Gait belt, Left in chair (in wheelchair with care transitioned to SLP, Hawa)  Restraints  Initially in place: No     Therapy Time   Individual Concurrent Group Co-treatment   Time In 0945         Time Out 1030         Minutes 45                Second Session Therapy Time     Individual Co-treatment   Time In 1345     Time Out 1430     Minutes Yohana 61, [id number]

## 2021-11-23 PROCEDURE — 97530 THERAPEUTIC ACTIVITIES: CPT

## 2021-11-23 PROCEDURE — 92526 ORAL FUNCTION THERAPY: CPT

## 2021-11-23 PROCEDURE — 6370000000 HC RX 637 (ALT 250 FOR IP): Performed by: PHYSICAL MEDICINE & REHABILITATION

## 2021-11-23 PROCEDURE — 97110 THERAPEUTIC EXERCISES: CPT

## 2021-11-23 PROCEDURE — 97116 GAIT TRAINING THERAPY: CPT

## 2021-11-23 PROCEDURE — 97535 SELF CARE MNGMENT TRAINING: CPT

## 2021-11-23 PROCEDURE — 94761 N-INVAS EAR/PLS OXIMETRY MLT: CPT

## 2021-11-23 PROCEDURE — 1280000000 HC REHAB R&B

## 2021-11-23 PROCEDURE — 97129 THER IVNTJ 1ST 15 MIN: CPT

## 2021-11-23 RX ADMIN — FENOFIBRATE 160 MG: 160 TABLET ORAL at 09:10

## 2021-11-23 RX ADMIN — SENNOSIDES AND DOCUSATE SODIUM 2 TABLET: 50; 8.6 TABLET ORAL at 09:10

## 2021-11-23 RX ADMIN — DULOXETINE HYDROCHLORIDE 60 MG: 60 CAPSULE, DELAYED RELEASE ORAL at 09:10

## 2021-11-23 RX ADMIN — RISPERIDONE 1 MG: 1 TABLET ORAL at 09:10

## 2021-11-23 RX ADMIN — MICONAZOLE NITRATE: 2 POWDER TOPICAL at 20:03

## 2021-11-23 RX ADMIN — RISPERIDONE 1 MG: 1 TABLET ORAL at 20:03

## 2021-11-23 RX ADMIN — MICONAZOLE NITRATE: 2 POWDER TOPICAL at 09:10

## 2021-11-23 RX ADMIN — OXYBUTYNIN CHLORIDE 10 MG: 5 TABLET ORAL at 20:03

## 2021-11-23 RX ADMIN — POLYETHYLENE GLYCOL 3350 17 G: 17 POWDER, FOR SOLUTION ORAL at 09:10

## 2021-11-23 RX ADMIN — SENNOSIDES AND DOCUSATE SODIUM 2 TABLET: 50; 8.6 TABLET ORAL at 20:03

## 2021-11-23 RX ADMIN — METHOCARBAMOL TABLETS 750 MG: 750 TABLET, COATED ORAL at 09:10

## 2021-11-23 RX ADMIN — METHOCARBAMOL TABLETS 750 MG: 750 TABLET, COATED ORAL at 13:26

## 2021-11-23 RX ADMIN — CITALOPRAM HYDROBROMIDE 20 MG: 20 TABLET ORAL at 09:10

## 2021-11-23 RX ADMIN — METHOCARBAMOL TABLETS 750 MG: 750 TABLET, COATED ORAL at 20:03

## 2021-11-23 ASSESSMENT — PAIN SCALES - WONG BAKER: WONGBAKER_NUMERICALRESPONSE: 0

## 2021-11-23 ASSESSMENT — PAIN SCALES - GENERAL: PAINLEVEL_OUTOF10: 0

## 2021-11-23 NOTE — CARE COORDINATION
Met with patient to review DC for Thursday. IMM letter presented. Reminded her to select the home care agency for home sn/pt/ot/hha from list that was provided to her. She asked that I call her sister chase. From the room, I called Aspen Quinn to discuss. She approves of email to [facility] for [facility]. Education given regarding CMS star rating. Emailed to  [email or web address]  Sister plans to come in tomorrow for therapy to see her and how she is doing. She will call me for the naming of the agency.   Akira Urbina [place]     Case Management   [phone]    11/23/2021  4:04 PM

## 2021-11-23 NOTE — PROGRESS NOTES
Speech Language Pathology  ACUTE REHAB UNIT  SPEECH/LANGUAGE PATHOLOGY      [x] Daily  [x] Weekly Care Conference Note  [] Discharge    Rosalie Pabon      FHO:2/87/1863  TDP:[id number]  Rehab Dx/Hx: Stenosis of cervical spine with myelopathy ([facility] [address] [M48.02, G99.2]    Precautions: FAll risk; Post Cervical spine Surgery; Dysphagia; documented HX of CP  Home situation: Lives with family  ST Dx: [] Aphasia  [] Dysarthria  [] Apraxia   [x] Oropharyngeal dysphagia [] Cognitive   Impairment  [] Other:   Initial Speech Therapy Dysphagia Assessment (CHRISTIAN)Diagnosis:   1. Dysphagia Diagnosis: Moderate Oropharyngeal Dysphagia  characterized by reduced mastication ( which is further impacted by loose upper denture); reduced lingual coordination for bolus control/bolus formation and A-P oral transit; delayed initiation of swallow and concern for reduced laryngeal elevation and reduced pharyngeal clearance. Pt with overt clinical s/s with concern for pharyngeal pooling and aspiration with thin liquids; textured soft food consistencies/meats. Pt appeared to better tolerate mildly thick liquids; moderately thick liquids; puree; and minced and moist food consistencies as evidenced by no complaints and no overt clinical s/s post swallow. Plan to downgrade diet to mildly thick liquids with minced and moist food consistencies. Will discuss with MD regarding MBSS to further assess if s/s persist.Discussed with pt and RN. Pt was agreeable to trial downgrade. Pt self reports some history of acid reflux/indigestion. Date of Admit: 11/12/2021  Room #: A6I-8394/3262-01  Date: 11/23/2021       Current functional status (updated daily):         Current Diet Order:ADULT DIET; Dysphagia - Pureed; Mildly Thick (Nectar)   Communication: []WFL  [] Aphasia  [] Dysarthria  [] Apraxia  [] Pragmatic Impairment [] Non-verbal  [] Hearing Loss  [x] Other: Limited assessment for CHRISTIAN.  Functional for expressing needs/wants; follow simple commands; oral motor speech disturbance which pt self reports is baseline. Cognition: [] WFL  [] Mild  [] Moderate  [] Severe [] Unable to Assess  [x] Other:Limited assessment for CHRISTIAN; mild concern for impulsivity and following compensatory swallow strategies/self monitoring  Memory: [] WFL  [] Mild  [] Moderate  [] Severe [] Unable to Assess  [x] Other: Limited assessment for Christian; mild concern for recall of new learning/working memory  Behavior: [x] Alert  [x] Cooperative  [x]  Pleasant  [] Confused  [] Agitated  [] Uncooperative  [x] Distractible [] Motivated  [] Self-Limiting [] Anxious  [] Other:  Endurance:  [x] Adequate for participation in SLP sessions  [] Reduced overall  [] Lethargic  [] Other:  Safety: [] No concerns at this time  [] Reduced insight into deficits  []  Reduced safety awareness [] Not following call light procedures  [] Unable to Assess  [x] Other:self monitoring/carryover  Swallowing Precautions: Compensatory Swallowing Strategies: Upright as possible for all oral intake; Small bites/sips; Swallow 2 times per bite/sip; Remain upright for 30-45 minutes after meals     Barriers toward progress: none unless medical issues; memory for new learning/self monitoring and caregiver support           Date: 11/23/2021      Tx session 1 Tx session 2   Total Timed Code Min SLP Individual Minutes  Time In: 5289  Time Out: 2355  Minutes: 45  Coded treatment time 10  n/a     Group Treatment Minutes 0 0   Co-Treat Minutes 0 0   Variance/Reason:  n/a    Pain denied    Pain Intervention [] RN notified  [] Repositioned  [] Intervention offered and patient declined  [] N/A  [] Other: [] RN notified  [] Repositioned  [] Intervention offered and patient declined  [] N/A  [] Other:   Subjective     Pt seen in tx office, motivated and cooperative. Pt with voiced recognition of SLP therapy and recent MBSS. Pt IND recall of recommendations.    Pt asking re: ability to diet upgrade prior to DC on 11/25 Objective:  Goals     Dysphagia Goals: Pt goal is to eat wihtout choking or it getting stuck Therapy working toward pt goal. Overall improvement with diet downgrade. Pt reports good toleration, no coughing spells and overall feeling of improved swallow function. 1. Modified Goal  Pt will tolerate dysphagia puree food consistencies with mild thick liquids with compensatory swallow strategies Puree/mild thick via cup: no overt s/s      2. Pt will demonstrate improved swallow response post tongue base retraction and laryngeal elevation ex and use of effortful swallow technique\" 10/10;     Dysphagia ex completed 10 reps each, and written notes provided and reviewed for room practice (yawn hold x 10 seconds; falsetto sustained highest note; effortful swallow; glottals 2 sets)     Noted lingual coordination deficits        3. Modified Goal   Pt will tolerate trials of minced and moist/mech soft  food consistencies OR thin liquids 10/10 without overt clinical s/s of aspiration  Trials of thin water via cup: 5/5 small sips and dry swallow: no cough, throat clear or VQ change    Trials of minced moist alternating with NTL; small sips and dry swallow: 10/10 without cough, throat clear, or VQ change. Slight delayed throat clear noted approx 3 min after PO completion. 4.  The patient/caregiver will demonstrate understanding of compensatory strategies for improved swallowing safety. IND for rationale of clearance swallows between one presentation; written cues for reminders which pt did not need to reference for recall this date; min cues for execution with PO         Other areas targeted: Cognition:   Pt IND verbalizing need for small sips, with min cues for consistency     Apparent recall and carryover from previous tx sessions with vanishing cues; IND recall to bring written notes/exs to tx from her room    Ongoing ed in s/s of aspiration and cues for airway clearance strategies.          Education:   Ongoing pt ed    Safety Devices: [] Call light within reach  [] Chair alarm activated  [] Bed alarm activated  [x] Other: transport returned to room [] Call light within reach  [] Chair alarm activated  [] Bed alarm activated  [] Other:    Progress Assessment: 11/17/2021: Overall appears improved as compared to 11/16/2021 and diet modification appears to be a more functional one for pt. However; pt with 2 epiosdes of coughin/choking and both occurred when distracted. Discussed with MD and recommend MBSS to furtehr assess pahrygneal phase of the swallow  11/18/2021 MBSS Oropharyngeal Dysphagia characterized by prolonged, at times incomplete mastication; delayed initiation of swallow; reduced laryngeal elevation with reduced epiglottic distention and hyolaryngeal excursion and reduced pharyngeal peristalsis with delayed UES opening and reduced UES opening with potential CP dysfunction or post Surgery edema with anterior bulging which may be impacting laryngeal rom and UES opening. Please refer to Radiologist documentation. Pt with penetration before/during swallow of thin liquids. Pt with pyriform sinus residual post swallow all consistencies with penetration post swallow. Pt with sensation of deep penetration with cough/throat clearing. Post swallow food residue in the valleculae and the pyriform sinus with difficulty clearing and persistent penetration despite attempts post swallow. Head postures were not attempted due to post cervical surgery precautions/restrictions. Recommend further downgrade to dysphagia puree with mildly thick liquids with close monitor due to concern for poor pharyngeal clearance with penetration post swallow of residue and risk of aspiration. Pt does need to complete clearance swallows between presentations. If s/s persist pt may require further downgrade FROM puree TO moderately thick liquids and continued mildly thick liquids.  Crush all meds if able due to pharyngeal residue in the valleculae and pyriform sinus post swallow and risk of penetration/aspiration  11/18 SLP ed with RN; pt; pt's Sister chase; and discussed with MD (Dr. Rosalina Tripp)  11/23: tolerated trials of minced/moist with nectar, and trials of thin water in isolation, without overt s/s when executing strategies. Apparent improved swallow function with small sample; rec consideration for repeat MBS prior to DC home to determine if diet/liquids may be safely upgraded   Plan: Continue as per plan of care.    Continued Tx Upon Discharge: ?    [x] Yes [] No [] TBD based on progress while on ARU [] Vital Stim indicated [] Other:   Estimated discharge date: 11/25   Discharge recommendations:   [] Home independently  [x] Home with assistance []  24 hour supervision  [] ECF [] Other:     Additional information:     Interventions used during Rehab Stay:  [] Speech/Language Treatment  [] Instruction in HEP [] Group [x] Dysphagia Treatment [x] Cognitive Treatment  For dysphagia ex and for compensatory swallow strategies/self monitoring [] Other:      Electronically Signed by    Korey Rosenbaum MS, CCC-SLP #[id number]  Speech Language Pathologist n/a

## 2021-11-23 NOTE — PATIENT CARE CONFERENCE
[facility]  Inpatient Rehabilitation  Weekly Team Conference Note      Date: 2021  Patient Name:  Cesilia Lopez    MRN: [id number]  : 1950  Gender: female  Physician: Dr Milagros Lopez  Diagnosis: Stenosis of cervical spine with myelopathy ([facility] [address] [M48.02, G99.2]    CASE MANAGEMENT  Assessment:    Goal is home; agreeable to home care services. PHYSICAL THERAPY    Bed mobility  Bridging: Minimal assistance (small lift)  Rolling to Left: Independent  Rolling to Right: Modified independent  Supine to Sit: Modified independent  Sit to Supine: Modified independent  Scooting: Modified independent  Comment: bed mobility done on regular mat with use of simulated side rail. Transfers:  Sit to Stand: Supervision  Stand to sit: Supervision  Bed to Chair: Supervision (with wheeled walker)  Comment: cues for hand placement    Ambulation 1  Surface: level tile  Device: Rolling Walker  Assistance: Supervision  Quality of Gait: step through pattern with reduced step length bilaterally, reduced bilateral foot clearance but is able to clear in swing phase (reduces with increased fatigue). Internal rotation of Left LE (L>R), which is chronic in nature  Gait Deviations: Slow Patsy, Decreased step length, Decreased step height  Distance: 15' to and from stairs, 110' with two turns  Comments: no LOB. Fatigues with multiple bouts of ambulation and foot clearance reducing. Stairs  # Steps : 12  Stairs Height: 6\"  Rails: Right ascending  Device: No Device  Assistance: Supervision  Comment: Patient used bilateral hands on right rail ascending with non-reciprocal pattern. She used slow pace but demonstrated improved ability to lift left LE up onto each step. She has difficulty positioning her feet on each step at times, and needs to reposition the feet before ascending or descending the next step. This is happening much less frequently than previous attempts    Curbs: 6\" (with wheeled walker.  Patient required cues for sequence. She requires assist very minimal assist to advance walker, but supervision for balance)    Car Transfer: Supervision (with using wheeled walker for approach. She has difficulty lifting left LE in and out of the car, but was able to complete without physical assistance.)      Assessment: Ms. Hyacinth Rosas continues to demonstrate improving strength and endurance. She increased distance ambulating with wheeled walker to 110' and is supervision for balance. During ambulation, she is improving clearance of bilateral LE, but is still limited and reduces with fatigue. She responds well to verbal cueing and has improved foot clearance with shoes on. Patient is improving with stairs and she was able to ascend/descend 12 steps using bilateral hands on 1 rail to simulate home environment with supervision. Patient continues to be below baseline and will benefit from continued skilled therapy for strengthening, gait training, and functional mobility training to allow for return home. SPEECH THERAPY (intentionally left blank if not actively being seen by this service):  Pt with strong participation with dysphagia therapy; recalls rationale for puree/mildly thick diet and compensatory strategies, as recommended on MBS completed 11/18 which revealed oropharyngeal dysfunction (reduced pharyngeal peristalsis with delayed UES opening and reduced UES opening with potential CP dysfunction or post Surgery edema with anterior bulging which may be impacting laryngeal rom and UES opening). Pt is tolerating current diet, as well as skilled trials of minced/moist and thin liquid in isolation. Pt is asking re: potential for diet upgrade prior to anticipated discharge; will benefit from repeat MBS prior to upgrade. Pt will benefit from continued skilled therapy while hospitalized and when discharged home.   Nivia Busch, MS, CCC-SLP #[id number]  Speech Language Pathologist      OCCUPATIONAL THERAPY    ADL  Equipment Provided: Reacher, Sock aid, Long-handled shoe horn, Long-handled sponge  Feeding: Modified independent , Thickened liquids  Grooming: Modified independent  (seated brushed hair/teeth & applied dentures mod ind)  UE Bathing: Setup (seated on shower chair, OT turned on & adjusted water for pt as pt having difficulty w/this, then washed self/hair mod ind)  LE Bathing: Setup (Pt used long sponge to bathe below knees, used grab bar in stance to bathe own buttocks,& hand held shower to rinse thouroughly as had 3rd loose stool of session. Says has suction grab bar in shower @ home. Rec to check suction connection regularly.)  UE Dressing: Modified independent   LE Dressing: Minimal assistance, Setup (Pt required assist w/sabrina hose D/T time limit/fatigue, completed all LB dressing Superv/mod ind w/use of grab bar in stance to pull up clothes + AE/increased time(OT handed pt AE))  Toileting: Modified independent  (Loose stool 2x on toilet, voided. managed own depends & hygiene.)  Additional Comments: Pt required increased time for all tasks, anson for donning bra. OT suggested to get different kind of bra as very difficult for pt to turn bra around. Pt left in w/c eating breakfast, needs in reac & nurse aware. OT asked PCA to assist pt to recliner after breakfast for comfort. Bed mobility  Bridging: Minimal assistance (small lift)  Rolling to Left: Independent  Rolling to Right: Modified independent  Supine to Sit: Modified independent  Sit to Supine: Modified independent  Scooting: Modified independent  Comment: bed mobility done on regular mat with use of simulated side rail. Functional Transfers: Toilet Transfers  Toilet - Technique: Ambulating (RW)  Equipment Used: Grab bars  Toilet Transfer: Modified independent  Toilet Transfers Comments: RW >< toilet with use of grab bars mod ind     Shower Transfers  Shower - Transfer From: Melanie Porrasing - Transfer Type:  To and From  [employer] - Transfer Alternate seating/furniture [] O2 [x] Hip Kit: _______  [] Life Line [x] Other: walker basket, bed rail_______  Factors facilitating achievement of predicted outcomes: Family support, Caregiver support, Motivated, Cooperative and Pleasant  Barriers to the achievement of predicted outcomes/Interventions:   No significant barriers to home with home care and support from family as needed       Interdisciplinary Individualized Plan of Care Review:    · Continue Current Plan of Care: Yes and No    · Modifications:___transition home with home care __________________________    Special Needs in the Upcoming Week :    [] Family/Caregiver Education  [] Home visit  []Therapeutic Pass   [] Consults:_______    [] Other;_______    Patient Rehab Team Goals for the Upcoming Week:  1. Assist with safe discharge plans for tomorrow. 2. Pt will tolerate diet without complications or complaints  3. Pt will perform ADL tasks mod ind except don/doffing sabrina hose. 4. Safe discharge home with home care 11/25/21         Team Members Present at Conference:  Physician: Dr Annie Cheney  : [place], [place]    Occupational Therapist: Corinne Sola, OTR/L #[id number]  [id number]  Speech Therapist: Jacob Berrios [address], [id number]  Nurse: Aaron Martinez RN, CRRN  Dietitian: Karol Parker RD, LD   Katiana Roberts     I led this team conference and I approve the established interdisciplinary plan of care as documented within the medical record of [facility]. MD: Tracy Littlejohn.  Annie Cheney MD 11/24/2021, 11:11 AM

## 2021-11-23 NOTE — PROGRESS NOTES
Department of Physical Medicine & Rehabilitation  Progress Note    Patient Identification:  Lesley Lovett  [id number]  : 1950  Admit date: 2021    Chief Complaint: Stenosis of cervical spine with myelopathy ([address]    Subjective:   No acute events overnight. Patient seen this afternoon sitting up in room. She reports she feels she is making progress with swallow. D/w SLP, will repeat MBS tomorrow to determine if diet can be upgraded prior to discharge. Labs reviewed. ROS: No f/c, n/v, cp     Objective:  Patient Vitals for the past 24 hrs:   BP Temp Temp src Pulse Resp SpO2 Weight   21 0900 104/64 97.4 °F (36.3 °C) Oral 78 16 95 % --   21 0815 -- -- -- -- -- 96 % --   21 0520 111/63 97.7 °F (36.5 °C) Oral 72 16 94 % 154 lb 1.6 oz (69.9 kg)   21 125/67 97.9 °F (36.6 °C) Oral 75 18 97 % --   21 1630 121/79 98.6 °F (37 °C) Oral 80 16 97 % --     Const: Alert. No distress, pleasant. HEENT: Normocephalic, atraumatic. Normal sclera/conjunctiva. MMM. +Strabismus. CV: Regular rate and rhythm. Resp: No respiratory distress. Lungs CTAB. Abd: Soft, nontender, nondistended, NABS+   Ext: No edema. Neuro: Alert, oriented. Left hemiparesis. Psych: Cooperative, appropriate mood and affect    Laboratory data: Available via EMR. Last 24 hour lab  No results found for this or any previous visit (from the past 24 hour(s)). Therapy progress:  PT  Position Activity Restriction  Other position/activity restrictions: ACDF precautions (no excessive cervical motion), Hx of CP;  21: Diet: Dysphagia-minced and moist; liquids: mildly thick (nectar).   Objective     Sit to Stand: Supervision  Stand to sit: Supervision  Bed to Chair: Supervision (with wheeled walker)  Device: Rolling Walker  Assistance: Supervision  Distance: 15' to and from stairs, 80' with two turns, short distances in therapy gym  OT  PT Equipment Recommendations  Equipment Needed: No  Other: owns wheeled walker, may have family purchase bedrail to improve independence with bed mobility  Toilet - Technique: Ambulating  Equipment Used: Grab bars  Toilet Transfers Comments: RW, cues for hand placement  Assessment        SLP  Diet Solids Recommendation: Dysphagia Minced and Moist (Dysphagia II)  Liquid Consistency Recommendation: Mildly Thick (Nectar)    Body mass index is 30.1 kg/m². Assessment and Plan:    Cervical disc herniation with myelopathy  -s/p urgent C4-5 ACDF (11/10 with Dr. Farhan Johnson)  -Incision healing well without evidence of infection  -PT/OT    Dysphagia   -Suspect due to ACDF/intubation  -SLP consulted  -MBS (11/18) - D/w SLP. Significant prevertebral soft tissue swelling. Down graded to pureed + nectars.  ---D/w Nsgy team, do not feel benefits of steroid would outweigh potential risks.   -Plan to repeat MBS (11/24)    Cerebral palsy  -Baseline left hemiparesis  -PT/OT    HLD  -fenofibrate    Depression  -citalopram, duloxetine, risperidone    Bladder  -High risk retention  -Monitor PVRs, straight cath prn >300    Bowel  -High risk constipation  -senna+colace BID, prn miralax, MoM, bisacodyl supp    Pain control  -oxycodone prn, methocarbamol    DVT ppx  -None per Nsgy, SCDs    Rehab Progress: Making progress. Working on functional mobility, balance, compensatory strategies for ADLs, swallow.   Anticipated Dispo: home with sister  Services:  PT, OT, SLP, RN  DME: [facility]  ELOS: 11/25      Electronically signed by Chava Yoon MD on 11/23/2021 at 1:13 PM

## 2021-11-23 NOTE — PLAN OF CARE
Problem: Falls - Risk of:  Goal: Will remain free from falls  Description: Will remain free from falls  11/22/2021 2353 by Kaleigh Milan RN  Outcome: Ongoing  Note: Fall risk precautions in place. Bed in lowest position with wheels locked, bed alarm in place and activated, bed, SAFE sign on the door, non-skid socks on pt, fall risk ID on pt, and call light in reach. Patient encouraged to call before getting out of bed and for any other needs or complaints. Problem: Skin Integrity:  Goal: Will show no infection signs and symptoms  Description: Will show no infection signs and symptoms  11/22/2021 2353 by Kaleigh Milan RN  Outcome: Ongoing  Note: Pt is at risk for impaired skin integrity. Assess skin every shift and prn. Turn every 2 hours. Keep heels off bed. Keep skin clean and dry. Micotin powder as ordered.      Problem: Skin Integrity:  Goal: Absence of new skin breakdown  Description: Absence of new skin breakdown  Outcome: Ongoing     Problem: Pain:  Goal: Pain level will decrease  Description: Pain level will decrease  Outcome: Ongoing     Problem: Daily Care:  Goal: Daily care needs are met  Description: Daily care needs are met  Outcome: Ongoing

## 2021-11-23 NOTE — PROGRESS NOTES
Nutrition Assessment     Type and Reason for Visit: Reassess    Nutrition Recommendations/Plan:   Continue current diet per SLP rec's     Nutrition Assessment:  Follow-up. SLP following. Diet downgraded to dysphagia pureed diet with mildly thick liquids since last assessment. Recorded intakes % at all meals. No nutrition concers at this time. Malnutrition Assessment:  Malnutrition Status: No malnutrition    Nutrition Related Findings: +BM 11/21      Current Nutrition Therapies:    ADULT DIET;  Dysphagia - Pureed; Mildly Thick (Nectar)    Anthropometric Measures:  · Height: 5' (152.4 cm)  · Current Body Wt: 154 lb (69.9 kg)   · BMI: 30.1    Nutrition Diagnosis:   No nutrition diagnosis at this time    Nutrition Interventions:   Food and/or Nutrient Delivery:  Continue Current Diet  Nutrition Education/Counseling:  Education not indicated   Coordination of Nutrition Care:  Continue to monitor while inpatient    Goals:  PO intake greater than 50%       Nutrition Monitoring and Evaluation:   Behavioral-Environmental Outcomes:  None Identified   Food/Nutrient Intake Outcomes:  Food and Nutrient Intake  Physical Signs/Symptoms Outcomes:  Chewing or Swallowing, Weight, Skin     Discharge Planning:    Continue current diet     Electronically signed by Alvaro Solorio RD, LD on 11/23/21 at 1:15 PM EST    Contact: [phone]

## 2021-11-23 NOTE — PROGRESS NOTES
Occupational Therapy  Facility/Department: [address] IP REHAB  Daily Treatment Note  NAME: Marichuy Ko  : 1950  MRN: [id number]    Date of Service: 2021    Discharge Recommendations:  Home with assist PRN, S Level 2, Home with Home health OT  OT Equipment Recommendations  ADL Assistive Devices: Reacher; Sock-Aid Hard; Long-handled Shoe Horn  Other: TSF, hip kit, 1/2 bedrail    Assessment   Performance deficits / Impairments: Decreased functional mobility ; Decreased strength; Decreased ADL status; Decreased safe awareness; Decreased endurance; Decreased balance; Decreased high-level IADLs  Assessment: pt has met 4/5 STGs & 1/5 LTGs w/ OT intervention. Today she was MI using RW in kitchen + basket to get juice from fridge & transport to counter to pour into cup; she then transported it over to table. She is improving w/ getting closer to objects & keeping her feet apart to reduce fall risk & reaching outside [place]. Pt completed bed mob using 1/2 bedrail w/ MI, extra time to lift LEs into bed. Pt given written info on DME recommendations including bedrail, hip kit, TSF; pt will benefit from home OT services to cont working on IND w/ IADL/ADLs  Treatment Diagnosis: decreased: ADLs, fxl transfers/mobility, IADLs  Prognosis: Good  Decision Making: Medium Complexity  History: 72 yo F w/ PMHx CP who lives in a 2 story home w/ her sister and NICK. House has 1 [address]AK[address] to enter and 1 flight of steps to pt's bedroom and full bathroom. Pt has been sleeping in a recliner chair on main level, which only has a half bath. She was previously independent, her sister works during the day.  Pt has RW, w/c, shower chair, and GBs  Exam: Bed mob, fxl transfers, fxl mobility  Assistance / Modification: MI using RW + basket in kitchen & w/ bedmob using 1/2 rail  OT Education: Transfer Training; Equipment; IADL Safety  Patient Education: pt given written info to share w/ sister regarding [facility] services, DME recommendations to order from [employer], reviewed use of basket & 1/2 bedrail  Barriers to Learning: hearing  REQUIRES OT FOLLOW UP: Yes  Activity Tolerance  Activity Tolerance: Patient Tolerated treatment well  Activity Tolerance: Pt required extended time to complete bed mob using 1/2 bedrail  Safety Devices  Safety Devices in place: Yes  Type of devices: Gait belt         Patient Diagnosis(es): There were no encounter diagnoses. has a past medical history of Depression, Hyperlipidemia, and Hypertension. has a past surgical history that includes cervical fusion (N/A, 11/10/2021). Restrictions  Restrictions/Precautions  Restrictions/Precautions: Fall Risk  Position Activity Restriction  Other position/activity restrictions: ACDF precautions (no excessive cervical motion), Hx of CP;  11-16-21: Diet: Dysphagia-minced and moist; liquids: mildly thick (nectar). Subjective   General  Chart Reviewed: Yes, Progress Notes  Patient assessed for rehabilitation services?: Yes  Additional Pertinent Hx: per H&P: \"73year-old female patient with cerebral palsy with recent history of increasing debility, inability to walk, generalized weakness. Patient states that she was not able to perform her ADLs, use her walker and her sister is not able to help her any longer as well. Work-up revealed the patient had severe cervical spinal cord compression due to large disc herniation and osteophytes at the C4-5 level. Patient was taken to surgery on 11/10 where she underwent anterior cervical discectomy with fusion and fixation of C4-5 per Dr. Gianna Arora. Patient started in therapies today. Patient lives at home with her sister in a two-level house. \"  Family / Caregiver Present: No  Referring Practitioner: Jose  Diagnosis: ANTERIOR CERVICAL DISCECTOMY WITH FUSION, WITH INTERBODY IMPLANT AND WITH PLATE AND SCREW FIXATION C4-5 (N/A Spine Cervical)  Subjective  Subjective: met in therapy dept, she denies pain  General Comment  Comments: asking to work on bed Cognition  Overall Cognitive Status: WFL  Memory: Decreased short term memory  Cognition Comment: pleasant & cooperative, is [place]                    Type of ROM/Therapeutic Exercise  Comment: BUE -hand gripper ex, x20 reps, 2 lb hand wts for the following strengthening exercises  Exercises  Shoulder Elevation: x 15 shld shrugs  Shoulder Flexion: x 5  Horizontal ABduction: x10  Horizontal ADduction: x10  Elbow Flexion: x10  Elbow Extension: x10  Supination: x10  Pronation: x10  Wrist Flexion: x20 no wts  Wrist Extension: x 20 no wts  Finger Extension: x 20 no wts  Grasp/Release: 2 sets of 20 using 1.5 lb gripper  Other: x 10 abdominal squeezes for core strengthening to be IND w/ sit<>stand transitions                    Plan   Plan  Times per week: 5-6  Times per day: Twice a day  Plan weeks: DC Thurs to home w/ sister's support & HH services  Specific instructions for Next Treatment: car t/f, doff/donning shoes & teds w/ A/E  Current Treatment Recommendations: Strengthening, Functional Mobility Training, Gait Training, Endurance Training, Balance Training, ROM, Self-Care / ADL, Patient/Caregiver Education & Training, Safety Education & Training, Equipment Evaluation, Education, & procurement, Home Management Training    Goals  Short term goals  Time Frame for Short term goals: 1 week  Short term goal 1: Pt will complete ADL transfers w/ SBA--MET  Short term goal 2: Pt will toilet w/ min A--MET  Short term goal 3: Pt will feed and groom mod I  Short term goal 4: Pt will bathe w/ min A using AE prn--MET  Short term goal 5: Pt will dress w/ min A using AE prn--MET  Long term goals  Time Frame for Long term goals : 10-14 days  Long term goal 1: Pt will complete ADL transfers mod I--MET  Long term goal 2: Pt will toilet mod I  Long term goal 3: Pt will bathe mod I  Long term goal 4: Pt will dress mod I (may need assist with TEDs)  Long term goal 5: Pt will complete microwave meal prep task mod I  Patient Goals   Patient goals : Cosimo Murders able to get around, get my own shower, make a simple breakfast\"       Therapy Time   Individual Concurrent Group Co-treatment   Time In 0945         Time Out 1030         Minutes 45         Timed Code Treatment Minutes: [address], [place] [address]

## 2021-11-23 NOTE — PROGRESS NOTES
Patient admitted to rehab with cervical fusion C 4-5. A/Ox4. Transfers with one assist with gait belt and front wheeled walker. Mobility restrictions: lifting 8#, no overhead lifting. Limit to 40% ROM in neck or less. On dysphagia pureed with nectar thick liquid diet, tolerating well. Medications taken crushed in applesauce. On TIGIST hose for DVT prophylaxis. Skin: pink under breast and abdmenal panus. Oxygen: room air. Has been continent  bladder. LBM 11/23. Chair/bed alarms in use and call light in reach. Will monitor status and safety.

## 2021-11-23 NOTE — PLAN OF CARE
Problem: Falls - Risk of:  Goal: Will remain free from falls  Description: Will remain free from falls  Outcome: Ongoing  Note: Pt is at risk for falls. Call light in reach. Bed in low position. Alarm on. Nonskid footwear on. Possessions in reach. Gait belt with front wheeled walker for ambulation. Problem: Skin Integrity:  Goal: Will show no infection signs and symptoms  Description: Will show no infection signs and symptoms  Note: Pt is at risk for impaired skin integrity. Assess skin every shift and prn. Turn every 2 hours. Keep heels off bed. Keep skin clean and dry. Micotin as ordered. Problem: Infection - Surgical Site:  Goal: Will show no infection signs and symptoms  Description: Will show no infection signs and symptoms  Note: Pt is at risk for impaired skin integrity. Assess skin every shift and prn. Turn every 2 hours. Keep heels off bed. Keep skin clean and dry. Micotin as ordered.

## 2021-11-23 NOTE — PROGRESS NOTES
OCCUPATIONAL THERAPY  Progress Note   Second Session    Patient Name: 52400Michael Shell. Record Number: [id number]    Treatment Diagnosis: impaired ADL/IADL function and mobility following cervical sx    General  Chart Reviewed: Yes, Progress Notes  Patient assessed for rehabilitation services?: Yes  Additional Pertinent Hx: per H&P: \"73year-old female patient with cerebral palsy with recent history of increasing debility, inability to walk, generalized weakness. Patient states that she was not able to perform her ADLs, use her walker and her sister is not able to help her any longer as well. Work-up revealed the patient had severe cervical spinal cord compression due to large disc herniation and osteophytes at the C4-5 level. Patient was taken to surgery on 11/10 where she underwent anterior cervical discectomy with fusion and fixation of C4-5 per Dr. Kajal Perry. Patient started in therapies today. Patient lives at home with her sister in a two-level house. \"  Family / Caregiver Present: No  Referring Practitioner: Heis  Diagnosis: ANTERIOR CERVICAL DISCECTOMY WITH FUSION, WITH INTERBODY IMPLANT AND WITH PLATE AND SCREW FIXATION C4-5 (N/A Spine Cervical)     Restrictions/Precautions  Restrictions/Precautions: Fall Risk        Position Activity Restriction  Other position/activity restrictions: ACDF precautions (no excessive cervical motion), Hx of CP;  11-16-21: Diet: Dysphagia-minced and moist; liquids: mildly thick (nectar). Subjective: patient met in hallway w/ volunteer transporter & OT took over care of pt. Pt denied pain. Requested to work on bed mob. Objective: Transfers/mob: Pt transferred RW>< std bed, w/c & kitchen chair at table supervision to mod ind w/ RW, Sit>supine w/ shoes on difficult SBA/CGA, then doffed shoes & pt performed sit>< supine mod ind w/ use fo rail. Once in room pt amb w/ RW >< toilet in bathroom, > sink > recliner all supervision to mod ind. Reclined.  Needs in reach.    IADL: Pt amb in OT kitchen with RW & basket for carrying to retrieve drink from refrigerator and crackers from cupboard supervision to mod ind. Required a reminder on how to position RW @ side if basket on when standing at counter. Pt appeared safe with task but has safety concerns. Pt stood for 6 minutes for task. Pt reported her sister is aware she wants to get walker basket. To room via w/c. ADL: Pt donned shoes w/ long shoe horn increased time to unfold R heel w/ AE seated EOB. Toileting to void in room with use fo grab bar mod ind for clothes/hygiene & stood @ sink to wash hands all mod ind. Assessment: Pt performed simple food prep task from RW w/ basket supervision/mod ind standing for 6 minutes, including serving to table & sat in kitchen chair w/o arms as has this at her table, after cued for hand placement did sit & swing tech. She reported she is interested in getting walker basket for carrying. Pt toileted mod ind. She completed bed mob using 1/2 bedrail w/ extra time to lift LEs into bed mod ind once shoes were doffed. Pt will benefit from home OT services to cont working on progressing with w/ IADL function & walker safety.     Safety Device - Type of devices:  []  All fall risk precautions in place [] Bed alarm in place  [x] Call light within reach [x] Chair alarm in place [] Positioning belt [x] Gait belt [] Patient at risk for falls [] Left in bed [x] Left in chair [] Telesitter in use [] Sitter present [x] Nurse notified []  None      Therapy Time   Individual Co-treatment   Time In 1520     Time Out 1615     Minutes 55       Electronically signed by Lena Li OT on 11/23/2021 at 3:37 PM

## 2021-11-24 ENCOUNTER — APPOINTMENT (OUTPATIENT)
Dept: GENERAL RADIOLOGY | Age: 71
DRG: 560 | End: 2021-11-24
Attending: OBSTETRICS & GYNECOLOGY
Payer: MEDICARE

## 2021-11-24 PROCEDURE — 97110 THERAPEUTIC EXERCISES: CPT

## 2021-11-24 PROCEDURE — 92611 MOTION FLUOROSCOPY/SWALLOW: CPT

## 2021-11-24 PROCEDURE — 92526 ORAL FUNCTION THERAPY: CPT

## 2021-11-24 PROCEDURE — 6370000000 HC RX 637 (ALT 250 FOR IP): Performed by: PHYSICAL MEDICINE & REHABILITATION

## 2021-11-24 PROCEDURE — 74230 X-RAY XM SWLNG FUNCJ C+: CPT

## 2021-11-24 PROCEDURE — 97535 SELF CARE MNGMENT TRAINING: CPT

## 2021-11-24 PROCEDURE — 1280000000 HC REHAB R&B

## 2021-11-24 PROCEDURE — 97530 THERAPEUTIC ACTIVITIES: CPT

## 2021-11-24 PROCEDURE — 97116 GAIT TRAINING THERAPY: CPT

## 2021-11-24 RX ORDER — METHOCARBAMOL 500 MG/1
500 TABLET, FILM COATED ORAL 3 TIMES DAILY PRN
Qty: 20 TABLET | Refills: 0 | Status: SHIPPED | OUTPATIENT
Start: 2021-11-24 | End: 2021-12-01

## 2021-11-24 RX ADMIN — DULOXETINE HYDROCHLORIDE 60 MG: 60 CAPSULE, DELAYED RELEASE ORAL at 08:38

## 2021-11-24 RX ADMIN — OXYBUTYNIN CHLORIDE 10 MG: 5 TABLET ORAL at 21:20

## 2021-11-24 RX ADMIN — CITALOPRAM HYDROBROMIDE 20 MG: 20 TABLET ORAL at 08:39

## 2021-11-24 RX ADMIN — METHOCARBAMOL TABLETS 750 MG: 750 TABLET, COATED ORAL at 15:10

## 2021-11-24 RX ADMIN — METHOCARBAMOL TABLETS 750 MG: 750 TABLET, COATED ORAL at 21:20

## 2021-11-24 RX ADMIN — RISPERIDONE 1 MG: 1 TABLET ORAL at 08:39

## 2021-11-24 RX ADMIN — FENOFIBRATE 160 MG: 160 TABLET ORAL at 08:38

## 2021-11-24 RX ADMIN — METHOCARBAMOL TABLETS 750 MG: 750 TABLET, COATED ORAL at 08:38

## 2021-11-24 RX ADMIN — RISPERIDONE 1 MG: 1 TABLET ORAL at 21:20

## 2021-11-24 RX ADMIN — MICONAZOLE NITRATE: 2 POWDER TOPICAL at 21:20

## 2021-11-24 RX ADMIN — MICONAZOLE NITRATE: 2 POWDER TOPICAL at 08:39

## 2021-11-24 ASSESSMENT — PAIN SCALES - GENERAL: PAINLEVEL_OUTOF10: 0

## 2021-11-24 NOTE — CARE COORDINATION
SOCIAL WORK DISCHARGE SUMMARY:      DISCHARGE DATE:                Thursday, 11-         DISCHARGE PLACE:                  Home      Discharging to Facility/ Agency   · Name: [facility]  · Address:  [place] [address], [address] [facility] [place] [address]  · Phone:  [phone]  · Fax:  [phone]                              TRANSPORTATION:                   Tammy GoldSister cueto             TIME:                                  10-12 noon      PREFERRED PHARMACY:         [facility]             NUMBER:                              MD ORDERS:     Sn/pt/ot/sp/hha    Dme:      Family to obtain all DME needs.         [place], [place]     Case Management   [phone]    11/24/2021  12:01 PM

## 2021-11-24 NOTE — PLAN OF CARE
Problem: Falls - Risk of:  Goal: Will remain free from falls  Description: Will remain free from falls  11/23/2021 2230 by Sirisha Celeste RN  Outcome: Ongoing  Note: Pt is at risk for falls. Call light in reach. Bed in low position. Alarm on. Nonskid footwear on. Possessions in reach. Gait belt with front wheeled walker for ambulation. Problem: Skin Integrity:  Goal: Will show no infection signs and symptoms  Description: Will show no infection signs and symptoms  11/23/2021 2230 by Sirisha Celeste RN  Outcome: Ongoing   Note: Pt is at risk for impaired skin integrity. Assess skin every shift and prn. Turn every 2 hours. Keep heels off bed. Keep skin clean and dry. Micotin as ordered. Problem: Pain:  Goal: Control of acute pain  Description: Control of acute pain  Outcome: Ongoing  Note: Pain/discomfort being managed with scheduled analgesics per MD orders. Pt able to express presence and absence of pain and rate pain appropriately using numerical scale.     Problem: Infection - Surgical Site:

## 2021-11-24 NOTE — DISCHARGE INSTR - COC
11/23/21 154 lb 1.6 oz (69.9 kg)     Mental Status:  oriented, alert, and coherent    IV Access:  - None    Nursing Mobility/ADLs:  Walking   Assisted  Transfer  Independent with walker  Bathing  Independent  Dressing  Independent  [address]  Assisted  Med Delivery   crushed    Wound Care Documentation and Therapy:        Elimination:  Continence: Bowel: Yes  Bladder: Yes  Urinary Catheter: None   Colostomy/Ileostomy/Ileal Conduit: No       Date of Last BM: 11/24    Intake/Output Summary (Last 24 hours) at 11/24/2021 1117  Last data filed at 11/23/2021 1811  Gross per 24 hour   Intake 720 ml   Output --   Net 720 ml     I/O last 3 completed shifts: In: 12 [P.O.:960]  Out: -     Safety Concerns:     History of Falls (last 30 days)    Impairments/Disabilities:      None    Nutrition Therapy:  Current Nutrition Therapy:   - Oral Diet:  Dysphagia 1 pureed    Routes of Feeding: Oral  Liquids: Thin Liquids  Daily Fluid Restriction: no  Last Modified Barium Swallow with Video (Video Swallowing Test): not done    Treatments at the Time of Hospital Discharge:   Respiratory Treatments:   Oxygen Therapy:  is not on home oxygen therapy.   Ventilator:    - No ventilator support    Rehab Therapies: Physical Therapy, Occupational Therapy, and Speech/Language Therapy  Weight Bearing Status/Restrictions: No weight bearing restirctions  Other Medical Equipment (for information only, NOT a DME order):  walker  Other Treatments:     Patient's personal belongings (please select all that are sent with patient):  Glasses, Hearing Aides bilateral, Dentures      RN SIGNATURE:  Electronically signed by Nadia Mcdowell RN on 11/25/21 at 11:25 AM EST    CASE MANAGEMENT/SOCIAL WORK SECTION    Inpatient Status Date: 11-    Readmission Risk Assessment Score:  Readmission Risk              Risk of Unplanned Readmission:  15       Discharging to Facility/ Agency   Name: [facility]  Address:  [address] [facility] [place] [address]  Phone:  [phone]  Fax:  [phone]      / signature: [place]     Case Management   [phone]    11/24/2021  11:58 AM      PHYSICIAN SECTION    Prognosis: Good    Condition at Discharge: Stable    Rehab Potential (if transferring to Rehab): Good    Recommended Labs or Other Treatments After Discharge:   Home care PT, OT, SLP, RN, Aide  Follow-up with PCP, Neurosurgery, PM&R    Physician Certification: I certify the above information and transfer of Erica Penn  is necessary for the continuing treatment of the diagnosis listed and that she requires [address] for less 30 days.      Update Admission H&P: No change in H&P    PHYSICIAN SIGNATURE:  Electronically signed by Darryl Waterman MD on 11/24/21 at 11:17 AM EST

## 2021-11-24 NOTE — PROGRESS NOTES
Occupational Therapy  Facility/Department: [address] IP REHAB  Daily Treatment Note  AM and PM Sessions  Discharge Summary:   NAME: Bravo Angela  : 1950  MRN: [id number]    Date of Service: 2021    Discharge Recommendations:  Home with assist PRN, S Level 2, Home with Home health OT, Home with nursing aide, S Level 1  OT Equipment Recommendations  Equipment Needed: Yes  ADL Assistive Devices: Reacher; Sock-Aid Hard; Long-handled Shoe Horn; Toilet Safety Frame  Other: bed rail, has walker tray    Assessment   Performance deficits / Impairments: Decreased functional mobility ; Decreased strength; Decreased ADL status; Decreased safe awareness; Decreased endurance; Decreased balance; Decreased high-level IADLs  Assessment: Today pt bathed set-up to shower on chair, dressed w/ min A for sabrina hose, toileted mod ind all with use of RW, grab bar in shower and AE for LB dressing. Pt performed ADL transfers all mod ind from RW. Does have abnormal gait & is fall risk which was pre-morbid for pt. Pt will benefit from 1 more OT session today & cont'd OT post D/C + home health aid as rec supervision for showering, as shower in basement. Pt has made excellent progress on rehab & is ready for D/C w/ sister's assist for IADL tasks & intermittent supervision for ADL tasks. Plan to cont tx per POC. Treatment Diagnosis: decreased: ADLs, fxl transfers/mobility, IADLs  Prognosis: Good  OT Education: Transfer Training; Equipment; IADL Safety; ADL Adaptive Strategies; Family Education; Plan of Care; OT Role  Barriers to Learning: hearing  REQUIRES OT FOLLOW UP: Yes  Activity Tolerance  Activity Tolerance: Patient Tolerated treatment well  Safety Devices  Safety Devices in place: Yes  Type of devices: Gait belt; Left in chair; Nurse notified; Chair alarm in place; Call light within reach         Patient Diagnosis(es): There were no encounter diagnoses.       has a past medical history of Depression, Hyperlipidemia, and Hypertension. has a past surgical history that includes cervical fusion (N/A, 11/10/2021). Restrictions  Restrictions/Precautions  Restrictions/Precautions: Fall Risk  Position Activity Restriction  Other position/activity restrictions: ACDF precautions (no excessive cervical motion), Hx of CP;  11-16-21: Diet: Dysphagia-minced and moist; liquids: mildly thick (nectar). Subjective   General  Chart Reviewed: Yes, Progress Notes, Orders  Patient assessed for rehabilitation services?: Yes  Additional Pertinent Hx: per H&P: \"73year-old female patient with cerebral palsy with recent history of increasing debility, inability to walk, generalized weakness. Patient states that she was not able to perform her ADLs, use her walker and her sister is not able to help her any longer as well. Work-up revealed the patient had severe cervical spinal cord compression due to large disc herniation and osteophytes at the C4-5 level. Patient was taken to surgery on 11/10 where she underwent anterior cervical discectomy with fusion and fixation of C4-5 per Dr. Leonardo Hadley. Patient started in therapies today. Patient lives at home with her sister in a two-level house. \"  Response to previous treatment: Patient with no complaints from previous session  Family / Caregiver Present: No  Referring Practitioner: Heis  Diagnosis: ANTERIOR CERVICAL DISCECTOMY WITH FUSION, WITH INTERBODY IMPLANT AND WITH PLATE AND SCREW FIXATION C4-5 (N/A Spine Cervical)  Subjective  Subjective: Pt met in room in bed, in deep sleep. Awoke to stimuli (deaf w/o hearing aids)& agreed to ADL shower session. Denied pain. Depends were soaked w/urine. General Comment  Comments: . Orientation  WFL     Objective    ADL  Equipment Provided: Reacher; Sock aid; Long-handled shoe horn; Long-handled sponge  Feeding: Modified independent ;  Thickened liquids  Grooming: Modified independent  (seated brushed hair/teeth & applied dentures mod ind)  UE Bathing: Setup (seated on shower chair, OT turned on & adjusted water for pt as pt having difficulty w/this, then washed self/hair mod ind)  LE Bathing: Setup (Pt used long sponge to bathe below knees, used grab bar in stance to bathe own buttocks,& hand held shower to rinse thouroughly as had 3rd loose stool of session. Says has suction grab bar in shower @ home. Rec to check suction connection regularly.)  UE Dressing: Modified independent  (retrieved clothes & transported on RW w/ safety concerns during reach.)  LE Dressing: Minimal assistance; Setup (Pt required assist w/sabrina hose D/T time limit/fatigue, completed all LB dressing Superv/mod ind w/use of grab bar in stance to pull up clothes + AE/increased time(OT handed pt AE))  Toileting: Modified independent  (Loose stool 2x on toilet, voided. managed own depends & hygiene.)  Additional Comments: Pt required increased time for all tasks, anson for donning bra. OT suggested to get different kind of bra as very difficult for pt to turn bra around. Pt left in w/c eating breakfast, needs in reac & nurse aware. OT asked PCA to assist pt to recliner after breakfast for comfort. Balance  Sitting Balance: Modified independent   Standing Balance: Modified independent  (RW/grab bar)  Functional Mobility  Functional - Mobility Device: Rolling Walker  Activity: Transport items; Retrieve items; Other  Assist Level: Modified independent   Functional Mobility Comments: Pt amb w/ RW from bed, retrieved clothes, >< toilet in bathroom, >< shower chair in stall >w/c at sink all mod ind w/ safety concerns. Toilet Transfers  Toilet - Technique: Ambulating (RW)  Equipment Used: Grab bars  Toilet Transfer: Modified independent  Toilet Transfers Comments: RW >< toilet with use of grab bars mod ind  Shower Transfers  Shower - Transfer From: Dylan Raygoza - Transfer Type: To and From  Shower - Transfer To:  Shower seat with back  Shower - Technique: Ambulating (RW)  Shower Transfers: Modified place [] Positioning belt [x] Gait belt [] Patient at risk for falls [] Left in bed [x] Left in chair [] Telesitter in use [] Sitter present [] Nurse notified []  None                                           Plan   Plan  Times per week: 5-6  Times per day: Twice a day  Plan weeks: DC Thurs to home w/ sister's support & HH services  Specific instructions for Next Treatment: car t/f, doff/donning shoes & teds w/ A/E  Current Treatment Recommendations: Strengthening, Functional Mobility Training, Gait Training, Endurance Training, Balance Training, ROM, Self-Care / ADL, Patient/Caregiver Education & Training, Safety Education & Training, Equipment Evaluation, Education, & procurement, Home Management Training  Plan Comment: D/C tomorrow w/ sister and home therapy + nurse & home health aid          Goals  Short term goals  Time Frame for Short term goals: 1 week  Short term goal 1: Pt will complete ADL transfers w/ SBA--MET  Short term goal 2: Pt will toilet w/ min A--MET  Short term goal 3: Pt will feed and groom mod I-- MET  Short term goal 4: Pt will bathe w/ min A using AE prn--MET  Short term goal 5: Pt will dress w/ min A using AE prn--MET  Long term goals  Time Frame for Long term goals : 10-14 days  Long term goal 1: Pt will complete ADL transfers mod I--MET  Long term goal 2: Pt will toilet mod I--MET  Long term goal 3: Pt will bathe mod I  NOT MET, set-up  Long term goal 4: Pt will dress mod I (may need assist with TEDs)  NOT MET, assist for sabrina hose, uses AE  Long term goal 5: Pt will complete microwave meal prep task mod I  MET  Patient Goals   Patient goals : Rudy Roca able to get around, get my own shower, make a simple breakfast\"       Therapy Time   Individual Concurrent Group Co-treatment   Time In 0715         Time Out 0815         Minutes 60         Timed Code Treatment Minutes: 60 Minutes   Therapy Time     Individual Co-treatment   Time In [address]     Minutes [address], OT   Electronically signed by Lorena Kinney OTR/L  License # [id number]

## 2021-11-24 NOTE — PROGRESS NOTES
Physical Therapy  Facility/Department: [address] REHAB  Daily Treatment Note/Discharge Summary  NAME: Jackson Sherman  : 1950  MRN: [id number]    Date of Service: 2021    Discharge Recommendations:  Continue to assess pending progress, Patient would benefit from continued therapy after discharge   PT Equipment Recommendations  Equipment Needed: No  Other: owns wheeled walker, may have family purchase bedrail to improve independence with bed mobility    Assessment   Body structures, Functions, Activity limitations: Decreased functional mobility ; Decreased strength; Decreased safe awareness; Decreased endurance; Decreased balance; Decreased posture  Assessment: Ms. Criselda Penn is being discharged to home tomorrow. Sister, Lizzette Walden, attended therapy this AM and agreed that patient is functioning much better now. She does report some concern, but has no specific concerns to discuss with PT. Patient met all goals while on rehab and is bale to complete bed mobility with modified independence using a bed rail which sister plans to buy. She also is modified independent with transfers and ambulation household distances with wheeled walker. Santy Alfonso is able to complete a full flight of stairs with one rail with modified independence. She is safe to return home with PRN assistance from family and home PT. Treatment Diagnosis: impaired mobility  Specific instructions for Next Treatment: progress gait  Prognosis: Fair  Decision Making: Medium Complexity  PT Education: Plan of Care; General Safety; Functional Mobility Training; Transfer Training; Gait Training; Precautions; Adaptive Device Training; Energy Conservation; Family Education  REQUIRES PT FOLLOW UP: Yes  Activity Tolerance  Activity Tolerance: Patient Tolerated treatment well; Patient limited by endurance     Patient Diagnosis(es): There were no encounter diagnoses. has a past medical history of Depression, Hyperlipidemia, and Hypertension.    has a past surgical history that includes cervical fusion (N/A, 11/10/2021). Social/Functional History  Lives With:  (sister and NICK)  Type of Home: House  Home Layout: Two level, Bed/Bath upstairs, 1/2 bath on main level (2 + basement. Pt has been sleeping in a lounge chair on the first floor)  Home Access: Stairs to enter without rails  Entrance Stairs - Number of Steps: 1 thru garage + flight upstairs to bedroom with one rail  Bathroom Shower/Tub: Walk-in shower, Shower chair with back (showers in basement)  Bathroom Toilet: Standard (uses towel bar to stand sometimes)  Bathroom Equipment: Shower chair, 3-in-1 commode  Bathroom Accessibility: Accessible  Home Equipment: Rolling walker, Wheelchair-manual, 4 wheeled walker (walker tray)  ADL Assistance: Independent  Homemaking Assistance:  (microwave meals, family assist with heaving cleaning and laundry)  Ambulation Assistance: Independent (RW in home, w/c in community (sister pushes w/c))  Transfer Assistance: Independent  Active : No  Patient's  Info: Sister drives  Type of occupation: worked for [employer] [address]: Reading, word search puzzles  Additional Comments: Sister works during the day and pt home alone. Pt denies falls. Patient reports she has a regular wheeled walker on each floor of the house. The four wheeled walker is only used when out with her sister. Restrictions  Restrictions/Precautions  Restrictions/Precautions: Fall Risk  Position Activity Restriction  Other position/activity restrictions: ACDF precautions (no excessive cervical motion), Hx of CP;  11-16-21: Diet: Dysphagia-minced and moist; liquids: mildly thick (nectar). Subjective   General  Chart Reviewed: Yes  Additional Pertinent Hx: Pt is a 73 y.o. female with hx of CP who presented to the ED on 11/7/21 with progressive generalized weakness and debility. Per Dr. Britni Melo H&P, \"73year-old female patient with cerebral palsy with recent history of increasing debility, inability to walk, generalized weakness. Patient states that she was not able to perform her ADLs, use her walker and her sister is not able to help her any longer as well. Work-up revealed the patient had severe cervical spinal cord compression due to large disc herniation and osteophytes at the C4-5 level. Patient was taken to surgery on 11/10 where she underwent anterior cervical discectomy with fusion and fixation of C4-5 per Dr. Arnol Olivo. Response To Previous Treatment: Patient with no complaints from previous session. Family / Caregiver Present: Yes (sister, Bhavana Long)  Referring Practitioner: Dr. Rosemary Fernandez: patient seated in wheelchair following OT session and agreeable to therapy. She has no complaints of pain at this time. General Comment  Comments: Patient goes by Grimes Bennyreba. Objective   Bed mobility  Rolling to Left: Modified independent  Rolling to Right: Modified independent  Supine to Sit: Modified independent  Sit to Supine: Modified independent  Scooting: Modified independent (requires rolling and use of bed rail to scoot around in bed)  Comment: bed mobility done on regular bed in ADL apartment with use of bed rail. She requires increased time to complete all aspects of bed mobility. Transfers  Sit to Stand: Modified independent  Stand to sit: Modified independent  Bed to Chair: Modified independent (with wheeled walker)    Ambulation  Ambulation?: Yes  More Ambulation?: No  Ambulation 1  Surface: level tile  Device: Rolling Walker  Assistance: Modified Independent  Quality of Gait: step through pattern with reduced step length bilaterally, reduced bilateral foot clearance but is able to clear in swing phase (reduces with increased fatigue).  Internal rotation of Left LE (L>R), which is chronic in nature  Gait Deviations: Slow Patsy; Decreased step length; Decreased step height  Distance: 54' x2 with two turns  Comments: Sister, Bhavana Long, reports that she purchased a reacher for home use. PT reviewed possibility of placing velcro on the walker to allow for reacher to be with her. PT hung reacher on the walker and patient was then able to retrieve a small object from the floor with modified independence. Stairs/Curb  Stairs?: Yes  Stairs  # Steps : 12  Stairs Height: 6\"  Rails: Right ascending  Device: No Device  Assistance: Modified independent   Comment: Patient used bilateral hands on right rail ascending with non-reciprocal pattern. She used slow pace but continues to demonstrate improved ability to lift left LE up onto each step. She has difficulty positioning her feet on each step at times, and needs to reposition the feet before ascending or descending the next step. This is happening much less frequently than previous attempts. Sister, Raymnudo Bonilla, reports that the stairs look better than they have in a long time. Curbs: 6\" (Patient requires set up assist for the walker on the step, but is then able to step up and down the step without assistance)       Balance  Posture: Fair  Sitting - Static: Good  Sitting - Dynamic: Good  Standing - Static: +  Standing - Dynamic: Fair; +  Comments: standing balance with bilateral UE support on walker               Comment: PT assisted with transfer in and out of MBS chair. She did require min assist for this secondary to height of chair. PM Session  Patient reports her visit with her sister went well and she is happy with the patient's progress. Patient has no complaints at this time. Sit<>stand with modified independence. Ambulated 54' with 30' on carpet with wheeled walker with modified independence. Patient continues with decreased step length, but able to advance each foot. Mock car transfer done with use of wheeled walker for approach. Patient required increased time to swing bilateral LE in and out of the car, but was modified independent. Sit<>stand with modified independence.    Patient ambulated 80' with wheeled walker with modified independence. She was able to ambulate over transition strip without difficulty. She does continue with minimal bilateral foot clearance and internal rotation of LE. Patient fatigues with this distance and requires seated rest break. Patient stood at walker for bilateral UE support and PT bracing walker. She completed 10 reps bilateral LE ther ex: Marching, Hip flexion with knee extension, heel raises, and hamstring curls. Patient required seated rest break after every other exercise. Patient continues to be safe to return home tomorrow. She is discharged from inpatient physical therapy at this time.     Safety Device - Type of devices:  [x]  All fall risk precautions in place [] Bed alarm in place  [] Call light within reach [] Chair alarm in place [] Positioning belt [x] Gait belt [] Patient at risk for falls [] Left in bed [x] Left in chair (in wheelchair to return to room with transportation) [] Telesitter in use [] Sitter present [] Nurse notified []  None            Goals  Short term goals  Time Frame for Short term goals: 1 week  Short term goal 1: bed mobility at minimal assist  Short term goal 2: transfers at SBA/CGA - met - 11/18/2021  Short term goal 3: ambulation 48' with wheeled walker with CGA - met 11/16  Short term goal 4: perform curb step ascen/descend with CGA/minimal assist  Short term goal 5: perform 8 steps with CGA with rail - met 11/16  Long term goals  Time Frame for Long term goals : 1.5 to 2 weeks  Long term goal 1: transfer with MI - met  Long term goal 2: bed mobility with MI - met  Long term goal 3: Patient ambulate 80' with MI - met  Long term goal 4: Perform 12 steps with rail and SBA - met  Long term goal 5: perform curb step with SBA - met  Patient Goals   Patient goals : wants to get home again and be able to walk to make simple meal    Plan    Plan  Times per week: 5-6 x/week  Times per day: Twice a day  Specific instructions for Next Treatment: progress gait  Current Treatment Recommendations: Strengthening, ADL/Self-care Training, ROM, Functional Mobility Training, Transfer Training, Gait Training, Patient/Caregiver Education & Training, Safety Education & Training, Positioning, Balance Training, Endurance Training, Stair training, Home Exercise Program, Equipment Evaluation, Education, & procurement  Safety Devices  Type of devices: All fall risk precautions in place, Gait belt, Left in chair (in [facility] chair to go down to [facility] with SLP.  Maryellen Carreno)  Restraints  Initially in place: No     Therapy Time   Individual Concurrent Group Co-treatment   Time In 1030         Time Out 1115         Minutes 45                Second Session Therapy Time     Individual Co-treatment   Time In 1345     Time Out 1430     Minutes Yohana 61, [id number]

## 2021-11-24 NOTE — PROCEDURES
[facility]   Speech Therapy   MODIFIED BARIUM SWALLOW      Piero Dewitt  AGE: 73 y.o. GENDER: female  : 1950  [id number]  EPISODE DATE:  2021  Ordering MD:  Ordering Physician: Emery Alexandra  Radiologist:  Radiologist: Sherita Melgar  Date of Eval:  2021     Type of Study: Modified Barium Swallow    ONSET DATE: 2021       MEDICAL DIAGNOSIS: S/P anterior Cervical Discectomy with fusion and fixation of C4-C5 level   TREATMENT DIAGNOSIS: Oropharyngeal Dysphagia    PAST MEDICAL HISTORY   has a past medical history of Depression, Hyperlipidemia, and Hypertension. PAST SURGICAL HISTORY  Past Surgical History:   Procedure Laterality Date    CERVICAL FUSION N/A 11/10/2021    ANTERIOR CERVICAL DISCECTOMY WITH FUSION, WITH INTERBODY IMPLANT AND WITH PLATE AND SCREW FIXATION C4-5 performed by Rosalie Amador MD at [address]  Allergies   Allergen Reactions    Atorvastatin Other (See Comments)     cpk over  1100 and only expalnation statins     Sulfur Other (See Comments)     Prior MBS 2021:  Oropharyngeal Dysphagia characterized by prolonged, at times incomplete mastication; delayed initiation of swallow; reduced laryngeal elevation with reduced epiglottic distention and hyolaryngeal excursion and reduced pharyngeal peristalsis with delayed UES opening and reduced UES opening with potential CP dysfunction or post Surgery edema with anterior bulging which may be impacting laryngeal rom and UES opening.  Please refer to Radiologist documentation  · Pt with penetration before/during swallow of thin liquids   · Pt with pyriform sinus residual post swallow all consistencies with penetration post swallow  · Pt with sensation of deep penetration with cough/throat clearing  · Post swallow food residue in the valleculae and the pyriform sinus with difficulty clearing and persistent penetration despite attempts post swallow  · Head postures were not attempted due to post cervical surgery precautions/restrictions  Recommend further downgrade to dysphagia puree with mildly thick liquids with close monitor due to concern for poor pharyngeal clearance with penetration post swallow of residue and risk of aspiration. Pt does need to complete clearance swallows between presentations. If s/s persist pt may require further downgrade FROM puree TO moderately thick liquids and continued mildly thick liquids. Crush all meds if able due to pharyngeal residue in the valleculae and pyriform sinus post swallow and risk of penetration/aspiration      Subjective:     Reason for referral: Yunier Hernandez was referred for a Modified Barium Swallow study to assess  the efficiency of swallow function, rule out aspiration and make recommendations regarding safe dietary consistencies, effective compensatory strategies, and safe eating environment. PATIENT AND FAMILY / STAFF COMPLAINTS:   pt hopeful for upgrade prior to planned DC on 11/25; reports improved swallow function x1 week   Diet prior to study:   Current Diet Solid Consistency: Dysphagia Pureed (Dysphagia I)  Current Diet Liquid Consistency: Mildly Thick (Nectar)    Objective: Impressions and Recommendations     IMPRESSIONS:    1. Behavior/Cognition: Alert, Cooperative, Pleasant mood, Requires cueing. Bilateral HAs. 2.Dysphagia Diagnosis:  Persistent yet mildly improved oropharyngeal dysphagia in comparison to 11/18 MBSS. Dysphagia characterized by slightly prolonged mastication with decreased lingual manipulation, decreased bolus control with textured solids, decreased AP bolus transit, mild delayed swallow initiation, decreased laryngeal elevation, inconsistent reduced epiglottic distention, reduced pharyngeal peristalsis.   Persistent (but suspected as mildly improved) delayed and reduced UES opening with potential CP dysfunction or post surgery edema with anterior bulging which may be impacting laryngeal rom and UES opening. · Premature loss to valleculae; episodic to pyriform with thin cup. No pooling noted  · Flash penetration of thin liquids during swallow via cup  · Trace to mild vallecular residue; mild pharyngeal wall residue/lining; mild to mod residue above CP. Residue more noted with heavier viscosities (mod-severe vallecular with soft bread)  · Multiple dry swallows and liquid wash assist in reducing residue, but inconsistent with full clearance  · NO penetration of residual material, as noted on previous MBS    Dysphagia Score: Dysphagia Outcome Severity Scale: Level 3: Moderate dysphagia- Total assisstance, supervision or strategies. Two or more diet consistencies restricted    Aspiration/Penetration Risk:   Penetration-Aspiration Scale (PAS): 2 - Material enters the airway, remains above the vocal folds, and is ejected from the airway    Diet Recommendations:  Solid consistency: Dysphagia Pureed (Dysphagia I)   Liquid consistency:  Thin   Medication administration:  (crushed in puree or in liquid)     Compensatory Swallow Strategies:   Compensatory Swallowing Strategies: Upright as possible for all oral intake, Small bites/sips, Swallow 2 times per bite/sip, Remain upright for 30-45 minutes after meals     Therapy:  Requires SLP Intervention: Yes  Duration/Frequency of Treatment: 5 times a week while on rehab    Therapy Interventions:   Therapeutic Interventions: Diet tolerance monitoring, Therapeutic PO trials with SLP, Patient/Family education, Laryngeal exercises, Pharyngeal exercises, Bolus control exercises, Tongue base strengthening; home therapy with therapeutic minced/moist trials with SLP prior to upgrade    Prognosis:  Prognosis for safe diet advancement: good (guarded medical DX; medical co-morbidities/post surgical issues)    Education:  Consulted and agree with results and recommendations: Patient, Family member, MD  Patient Education: results, recommendations  Patient Education Response: Needs reinforcement, Verbalizes understanding    D/C Recommendations: D/C Recommendations: Home ST              Assessment: Test Data   Pain:  Denies    General  Cognitive/Behavior  Behavior/Cognition  Behavior/Cognition: Alert; Cooperative; Pleasant mood; Requires cueing    Vision/Hearing  Vision  Vision: Impaired  Vision Exceptions: Wears glasses at all times  Hearing  Hearing: Exceptions to [facility]  Hearing Exceptions: Hard of hearing/hearing concerns; Bilateral hearing aid    Consistencies Assessed    Consistencies Administered: Dysphagia Minced and Moist (Dysphagia II), Dysphagia Pureed (Dysphagia I), Nectar cup, Thin cup, Dysphagia Soft and Bite-Sized (Dysphagia III)    Positioning   Upright lateral in chair    Indicators of Oral Phase Dysfunction   Oral Preparation / Oral Phase  Oral Phase: Impaired  Oral Phase - Major Contributing Deficits  Poor Mastication: Mechanical soft solid, Soft solid (disorganized; reduced with soft solid)  Weak Lingual Manipulation: All  Reduced Posterior Propulsion: All  Reduced Bolus Control: All  Decreased Bolus Cohesion: Soft solid, Mechanical soft solid  Lingual / Palatal Residue: Puree, Mechanical soft solid, Soft solid (min-mild)  Delayed Trigger of Palatal Elevation: All  Reduced Tongue Base Retraction: All    Indicators of Pharyngeal Phase Dysfunction  Pharyngeal Phase  Pharyngeal Phase: Impaired     Pharyngeal Phase - Major Contributing Deficits  Delayed Swallow Initiation: All (mild)  Premature Spillage to Valleculae: Nectar cup; Thin cup; Mechanical soft solid  Premature Spillage to Pyriform: Thin cup (occasional with thin)  Reduced Pharyngeal Peristalsis: All  Reduced Epiglottic Distention: All (inconsistent)  Reduced Laryngeal Elevation: All  Shallow Penetration During: Thin cup  Pharyngeal Residue - Valleculae: All (trace to mild with all, exception of mod-severe with soft solid)  Pharyngeal Residue - Posterior Pharynx: Mechanical soft solid; Nectar cup; Puree;  Thin cup (mild)  Pharyngeal Residue - UES: Nectar cup;  Thin cup; Puree; Mechanical soft solid    Upper Esophageal Phase   Upper Esophageal Screen  Esophageal Screen: Impaired  Esophageal Screen: Impaired  Upper Esophageal Screen- Major Contributing Deficits  Reduced Cricopharyngeal Opening: All      MINUTES/CHARGES  Time In: [phone]  SLP Individual Minutes  Time In: 1115  Time Out: 1200  Minutes: 45  35 MBS  10 dysphagia tx     Electronically signed by  David Carrillo MS, CCC-SLP #[id number]  Speech Language Pathologist    on 11/24/2021 at 227 PM

## 2021-11-24 NOTE — PROGRESS NOTES
CLINICAL PHARMACY NOTE: MEDS TO BEDS    Discharge medications are ready in inpatient pharmacy for weekend delivery.     11/24/21 3:04 PM

## 2021-11-24 NOTE — PROGRESS NOTES
Patient admitted to rehab with cervical fusion C 4-5.  A/Ox4. Transfers with one assist with gait belt and front wheeled walker. Mobility restrictions: lifting 8#, no overhead lifting.  Limit to 40% ROM in neck or less. On dysphagia pureed with nectar thick liquid diet, tolerating well. Medications taken crushed in applesauce.  On TIGIST hose for DVT prophylaxis.  Skin: pink under breast and abdmenal panus. Oxygen: room air.  Has been continent  bladder. LBM 11/24. Chair/bed alarms in use and call light in reach. Will monitor status and safety.

## 2021-11-24 NOTE — PROGRESS NOTES
Patient is in bed resting. Admitted to rehab with Cervical fusion c4-5. [employer] Respirations easy,Lungs CTA. On room air. On Dysphagia pureed with nectar thick liquids. tolerating well. Medications taken crushed in applesauce. Skin:Pink under breast and abdominal folds has micotin ordered. Incontinent of bladder at times, wears pull-ups. Wears knee high TIGIST hose for DVT Prophalaxis. Transfers using gait belt and walker x1 assist. Safety precautions remains in place,call light in reach,bed alarm activated,will continue to monitor.

## 2021-11-24 NOTE — PROGRESS NOTES
Department of Physical Medicine & Rehabilitation  Progress Note    Patient Identification:  Thuy Clement  [id number]  : 1950  Admit date: 2021    Chief Complaint: Stenosis of cervical spine with myelopathy ([address]    Subjective:   No acute events overnight. Patient seen this am sitting up in room. She reports feeling well and looking forward to discharge home tomorrow. We discussed plans for home care, medications, and follow-ups. Sister was present for our conversation. I also provided education regarding dysphagia diagnosis, prognosis/time line for recovery, importance of modified diet. Labs reviewed. ROS: No f/c, n/v, cp     Objective:  Patient Vitals for the past 24 hrs:   BP Temp Temp src Pulse Resp SpO2 Height   21 0544 131/75 97.3 °F (36.3 °C) Oral 90 16 96 % --   21 111/68 97.9 °F (36.6 °C) Oral 93 18 95 % --   21 1640 136/81 97.5 °F (36.4 °C) Oral 70 16 96 % --   21 1311 -- -- -- -- -- -- 5' (1.524 m)     Const: Alert. No distress, pleasant. HEENT: Normocephalic, atraumatic. Normal sclera/conjunctiva. MMM. +Strabismus. CV: Regular rate and rhythm. Resp: No respiratory distress. Lungs CTAB. Abd: Soft, nontender, nondistended, NABS+   Ext: No edema. Neuro: Alert, oriented. Left hemiparesis. Psych: Cooperative, appropriate mood and affect    Laboratory data: Available via EMR. Last 24 hour lab  No results found for this or any previous visit (from the past 24 hour(s)). Therapy progress:  PT  Position Activity Restriction  Other position/activity restrictions: ACDF precautions (no excessive cervical motion), Hx of CP;  21: Diet: Dysphagia-minced and moist; liquids: mildly thick (nectar).   Objective     Sit to Stand: Modified independent  Stand to sit: Modified independent  Bed to Chair: Modified independent (with wheeled walker)  Device: Rolling Walker  Assistance: Supervision  Distance: 15' to and from stairs, 80' with two turns, short distances in therapy gym  OT  PT Equipment Recommendations  Equipment Needed: No  Other: owns wheeled walker, may have family purchase bedrail to improve independence with bed mobility  Toilet - Technique: Ambulating (RW)  Equipment Used: Grab bars  Toilet Transfers Comments: RW >< toilet with use of grab bars mod ind  Assessment        SLP  Diet Solids Recommendation: Dysphagia Minced and Moist (Dysphagia II)  Liquid Consistency Recommendation: Mildly Thick (Nectar)    Body mass index is 30.1 kg/m². Assessment and Plan:    Cervical disc herniation with myelopathy  -s/p urgent C4-5 ACDF (11/10 with Dr. Katie Canada)  -Incision healing well without evidence of infection  -PT/OT    Dysphagia   -Suspect due to ACDF/intubation  -SLP consulted  -MBS (11/18) - D/w SLP. Significant prevertebral soft tissue swelling. Down graded to pureed + nectars.  ---D/w Nsgy team, do not feel benefits of steroid would outweigh potential risks.   -Plan to repeat MBS (11/24)    Cerebral palsy  -Baseline left hemiparesis  -PT/OT    HLD  -fenofibrate    Depression  -citalopram, duloxetine, risperidone    Bladder  -High risk retention  -Monitor PVRs, straight cath prn >300    Bowel  -High risk constipation  -senna+colace BID, prn miralax, MoM, bisacodyl supp    Pain control  -oxycodone prn, methocarbamol    DVT ppx  -None per Nsgy, SCDs    Rehab Progress: Interdisciplinary team conference was held today with entire rehab treatment team including PT, OT, SLP, Dietician, RN, and SW. Discussion focused on progress toward rehab goals and discharge planning. Making progress. Working on functional mobility, balance, compensatory strategies for ADLs, swallow. Separate conference then held with patient/family, questions answered and concerns addressed. Total treatment time >35 min with greater than 50% spent in care coordination.   Anticipated Dispo: home with sister  Services:  PT, OT, SLP, RN, Aide  DME: [facility]  ELOS: 11/25      Electronically signed by Radha Yo MD on 11/24/2021 at 11:11 AM

## 2021-11-25 VITALS
OXYGEN SATURATION: 95 % | TEMPERATURE: 98.3 F | RESPIRATION RATE: 16 BRPM | WEIGHT: 161.16 LBS | HEART RATE: 68 BPM | SYSTOLIC BLOOD PRESSURE: 91 MMHG | BODY MASS INDEX: 31.64 KG/M2 | DIASTOLIC BLOOD PRESSURE: 56 MMHG | HEIGHT: 60 IN

## 2021-11-25 LAB
ANION GAP SERPL CALCULATED.3IONS-SCNC: 11 MMOL/L (ref 3–16)
BASOPHILS ABSOLUTE: 0 K/UL (ref 0–0.2)
BASOPHILS RELATIVE PERCENT: 0.4 %
BUN BLDV-MCNC: 22 MG/DL (ref 7–20)
CALCIUM SERPL-MCNC: 9.3 MG/DL (ref 8.3–10.6)
CHLORIDE BLD-SCNC: 105 MMOL/L (ref 99–110)
CO2: 25 MMOL/L (ref 21–32)
CREAT SERPL-MCNC: 0.7 MG/DL (ref 0.6–1.2)
EOSINOPHILS ABSOLUTE: 0.2 K/UL (ref 0–0.6)
EOSINOPHILS RELATIVE PERCENT: 2.1 %
GFR AFRICAN AMERICAN: >60
GFR NON-AFRICAN AMERICAN: >60
GLUCOSE BLD-MCNC: 93 MG/DL (ref 70–99)
HCT VFR BLD CALC: 33.3 % (ref 36–48)
HEMOGLOBIN: 11 G/DL (ref 12–16)
LYMPHOCYTES ABSOLUTE: 3.1 K/UL (ref 1–5.1)
LYMPHOCYTES RELATIVE PERCENT: 40.9 %
MCH RBC QN AUTO: 29.9 PG (ref 26–34)
MCHC RBC AUTO-ENTMCNC: 33.1 G/DL (ref 31–36)
MCV RBC AUTO: 90.2 FL (ref 80–100)
MONOCYTES ABSOLUTE: 0.7 K/UL (ref 0–1.3)
MONOCYTES RELATIVE PERCENT: 8.7 %
NEUTROPHILS ABSOLUTE: 3.7 K/UL (ref 1.7–7.7)
NEUTROPHILS RELATIVE PERCENT: 47.9 %
PDW BLD-RTO: 13 % (ref 12.4–15.4)
PLATELET # BLD: 351 K/UL (ref 135–450)
PMV BLD AUTO: 7.6 FL (ref 5–10.5)
POTASSIUM REFLEX MAGNESIUM: 4 MMOL/L (ref 3.5–5.1)
RBC # BLD: 3.69 M/UL (ref 4–5.2)
SODIUM BLD-SCNC: 141 MMOL/L (ref 136–145)
WBC # BLD: 7.7 K/UL (ref 4–11)

## 2021-11-25 PROCEDURE — 36415 COLL VENOUS BLD VENIPUNCTURE: CPT

## 2021-11-25 PROCEDURE — G0008 ADMIN INFLUENZA VIRUS VAC: HCPCS | Performed by: PHYSICAL MEDICINE & REHABILITATION

## 2021-11-25 PROCEDURE — 6360000002 HC RX W HCPCS: Performed by: PHYSICAL MEDICINE & REHABILITATION

## 2021-11-25 PROCEDURE — 6370000000 HC RX 637 (ALT 250 FOR IP): Performed by: PHYSICAL MEDICINE & REHABILITATION

## 2021-11-25 PROCEDURE — 90686 IIV4 VACC NO PRSV 0.5 ML IM: CPT | Performed by: PHYSICAL MEDICINE & REHABILITATION

## 2021-11-25 PROCEDURE — 85025 COMPLETE CBC W/AUTO DIFF WBC: CPT

## 2021-11-25 PROCEDURE — 80048 BASIC METABOLIC PNL TOTAL CA: CPT

## 2021-11-25 PROCEDURE — 94760 N-INVAS EAR/PLS OXIMETRY 1: CPT

## 2021-11-25 RX ADMIN — METHOCARBAMOL TABLETS 750 MG: 750 TABLET, COATED ORAL at 09:46

## 2021-11-25 RX ADMIN — RISPERIDONE 1 MG: 1 TABLET ORAL at 09:46

## 2021-11-25 RX ADMIN — MICONAZOLE NITRATE: 2 POWDER TOPICAL at 09:46

## 2021-11-25 RX ADMIN — CITALOPRAM HYDROBROMIDE 20 MG: 20 TABLET ORAL at 09:46

## 2021-11-25 RX ADMIN — DULOXETINE HYDROCHLORIDE 60 MG: 60 CAPSULE, DELAYED RELEASE ORAL at 09:46

## 2021-11-25 RX ADMIN — INFLUENZA A VIRUS A/VICTORIA/2570/2019 IVR-215 (H1N1) ANTIGEN (PROPIOLACTONE INACTIVATED), INFLUENZA A VIRUS A/CAMBODIA/E0826360/2020 IVR-224 (H3N2) ANTIGEN (PROPIOLACTONE INACTIVATED), INFLUENZA B VIRUS B/VICTORIA/705/2018 BVR-11 ANTIGEN (PROPIOLACTONE INACTIVATED), INFLUENZA B VIRUS B/PHUKET/3073/2013 BVR-1B ANTIGEN (PROPIOLACTONE INACTIVATED) 0.5 ML: 15; 15; 15; 15 INJECTION, SUSPENSION INTRAMUSCULAR at 11:47

## 2021-11-25 RX ADMIN — FENOFIBRATE 160 MG: 160 TABLET ORAL at 09:46

## 2021-11-25 NOTE — PLAN OF CARE
Problem: Falls - Risk of:  Goal: Will remain free from falls  Description: Will remain free from falls  11/25/2021 1233 by Mauro Perdue RN  Outcome: Completed  11/25/2021 0105 by Jase De Anda RN  Outcome: Ongoing  Note: Pt educated on falls precautions and safety. Call light and personal belongings within reach at all times. Non skid socks in use when up. Hourly rounding and alarms active. Goal: Absence of physical injury  Description: Absence of physical injury  11/25/2021 1233 by Mauro Perdue RN  Outcome: Completed  11/25/2021 0105 by Jase De Anda RN  Outcome: Ongoing     Problem: Skin Integrity:  Goal: Will show no infection signs and symptoms  Description: Will show no infection signs and symptoms  11/25/2021 1233 by Mauro Perdue RN  Outcome: Completed  11/25/2021 0105 by Jase De Anda RN  Outcome: Ongoing  Goal: Absence of new skin breakdown  Description: Absence of new skin breakdown  11/25/2021 1233 by Mauro Perdue RN  Outcome: Completed  11/25/2021 0105 by Jase De Anda RN  Outcome: Ongoing  Note: Able to change positions in bed without assist, no evidence of skin breakdown noted. Waffle cushion in place to chair. Problem: Infection - Surgical Site:  Goal: Will show no infection signs and symptoms  Description: Will show no infection signs and symptoms  11/25/2021 1233 by Mauro Perdue RN  Outcome: Completed  11/25/2021 0105 by Jase De Anda RN  Outcome: Ongoing     Problem: ABCDS Injury Assessment  Goal: Absence of physical injury  11/25/2021 1233 by Mauro Perdue RN  Outcome: Completed  11/25/2021 0105 by Jase De Anda RN  Outcome: Ongoing     Problem: Pain:  Goal: Pain level will decrease  Description: Pain level will decrease  11/25/2021 1233 by Mauro Perdue RN  Outcome: Completed  11/25/2021 0105 by Jase De Anda RN  Outcome: Ongoing  Note: Able to rate pain using a 1-10 scale, medicated per prn orders, see MAR.  Yonny to verbalize a reduction in pain and/or able to fall asleep and remain asleep without any s/s of pain    Goal: Control of acute pain  Description: Control of acute pain  11/25/2021 1233 by America Beaulieu RN  Outcome: Completed  11/25/2021 0105 by Janae Huizar RN  Outcome: Ongoing     Problem: Daily Care:  Goal: Daily care needs are met  Description: Daily care needs are met  11/25/2021 1233 by America Beaulieu RN  Outcome: Completed  11/25/2021 0105 by Janae Huizar RN  Outcome: Ongoing     Problem: Discharge Planning:  Goal: Patients continuum of care needs are met  Description: Patients continuum of care needs are met  11/25/2021 1233 by America Beaulieu RN  Outcome: Completed  11/25/2021 0105 by Janae Huizar RN  Outcome: Ongoing     Problem: IP BLADDER/VOIDING  Goal: LTG - patient will achieve acceptable level of continence  11/25/2021 1233 by America Beaulieu RN  Outcome: Completed  11/25/2021 0105 by Janae Huizar RN  Outcome: Ongoing  Goal: STG - Patient demonstrates no accidents  11/25/2021 1233 by America Beaulieu RN  Outcome: Completed  11/25/2021 0105 by Janae Huizar RN  Outcome: Ongoing     Problem: IP SWALLOWING  Goal: LTG - Patient will demonstrate safe swallowing Intervention/techniques  11/25/2021 1233 by America Beaulieu RN  Outcome: Completed  11/25/2021 0105 by Janae Huizar RN  Outcome: Ongoing  Goal: STG - Patient will follow recommended swallowing strategies  11/25/2021 1233 by America Beaulieu RN  Outcome: Completed  11/25/2021 0105 by Janae Huizar RN  Outcome: Ongoing

## 2021-11-25 NOTE — PROGRESS NOTES
Patient discharged to home with home care. Discharge instructions provided and explained to patient and her sister, verbalized understanding, all questions answered. Prescription filled in outpatient pharmacy and sent home with patient. Belongings sent with patient. Patient transported to front entrance in wheelchair, transported home by sister.

## 2021-11-25 NOTE — CARE COORDINATION
CASE MANAGEMENT DISCHARGE SUMMARY:    DISCHARGE DATE: 11/25/21    DISCHARGED TO: Home              Discharging to Facility/ Agency   · Name: [facility]  · Address[place]  [place] [address], [address] [place] [address]  · Phone:  [phone]   Fax:  [phone]  Confirmed home care accepted.     TRANSPORTATION: Sister to transport             Electronically signed by GUI Barbour on 11/25/2021 at 11:03 AM

## 2021-11-25 NOTE — PLAN OF CARE
Problem: Falls - Risk of:  Goal: Will remain free from falls  Description: Will remain free from falls  11/25/2021 0105 by Ousmane Hdz RN  Outcome: Ongoing  Note: Pt educated on falls precautions and safety. Call light and personal belongings within reach at all times. Non skid socks in use when up. Hourly rounding and alarms active. Problem: Skin Integrity:  Goal: Absence of new skin breakdown  Description: Absence of new skin breakdown  Outcome: Ongoing  Note: Able to change positions in bed without assist, no evidence of skin breakdown noted. Waffle cushion in place to chair. Problem: Pain:  Goal: Pain level will decrease  Description: Pain level will decrease  11/25/2021 0105 by Ousmane Hdz RN  Outcome: Ongoing  Note: Able to rate pain using a 1-10 scale, medicated per prn orders, see MAR.  Able to verbalize a reduction in pain and/or able to fall asleep and remain asleep without any s/s of pain

## 2021-11-25 NOTE — PROGRESS NOTES
Resting quietly in bed. Pt admitted s/p cervical fusion. Transferring with a walker x1, CGA. Pt with a history of cerebral palsy with left foot weakness. Lungs CTA, HR regular. Abdomen non tender with active bowel sounds. Is continent of bladder, denies pain. Pt with discharge planned for today, no questions or concerns voiced. All needs assessed with Q2H rounding. Alarms active. Will monitor.  Marisa Skaggs RN

## 2021-11-26 NOTE — PROGRESS NOTES
Speech Language Pathology  ACUTE REHAB UNIT  SPEECH/LANGUAGE PATHOLOGY      [x] Daily  [] Weekly Care Conference Note  [x] Discharge    Clarice Weiss      FHE:[birth year]  Q:[id number]  Rehab Dx/Hx: Stenosis of cervical spine with myelopathy ([facility] [address] [M48.02, G99.2]    Precautions: FAll risk; Post Cervical spine Surgery; Dysphagia; documented HX of CP  Home situation: Lives with family  ST Dx: [] Aphasia  [] Dysarthria  [] Apraxia   [x] Oropharyngeal dysphagia [] Cognitive   Impairment  [] Other:   Initial Speech Therapy Dysphagia Assessment (CHRISTIAN)Diagnosis:   1. Dysphagia Diagnosis: Moderate Oropharyngeal Dysphagia  characterized by reduced mastication ( which is further impacted by loose upper denture); reduced lingual coordination for bolus control/bolus formation and A-P oral transit; delayed initiation of swallow and concern for reduced laryngeal elevation and reduced pharyngeal clearance. Pt with overt clinical s/s with concern for pharyngeal pooling and aspiration with thin liquids; textured soft food consistencies/meats. Pt appeared to better tolerate mildly thick liquids; moderately thick liquids; puree; and minced and moist food consistencies as evidenced by no complaints and no overt clinical s/s post swallow. Plan to downgrade diet to mildly thick liquids with minced and moist food consistencies. Will discuss with MD regarding MBSS to further assess if s/s persist.Discussed with pt and RN. Pt was agreeable to trial downgrade. Pt self reports some history of acid reflux/indigestion. Date of Admit: 11/12/2021  Room #: C2J-3152/3262-01  Date: 11/26/2021       Current functional status (updated daily):         Current Diet Order:No diet orders on file   Communication: []WFL  [] Aphasia  [] Dysarthria  [] Apraxia  [] Pragmatic Impairment [] Non-verbal  [] Hearing Loss  [x] Other: Oral motor speech disturbance which pt self reports is baseline.   Cognition: [] WFL  [] Mild  [] Moderate  [] Severe [] Unable to Assess  [x] Other:Min/mild impulsivity and following compensatory swallow strategies/self monitoring  Memory: [] WFL  [] Mild  [] Moderate  [] Severe [] Unable to Assess  [x] Other: Mild concern for recall of new learning/working memory  Behavior: [x] Alert  [x] Cooperative  [x]  Pleasant  [] Confused  [] Agitated  [] Uncooperative  [x] Distractible [] Motivated  [] Self-Limiting [] Anxious  [] Other:  Endurance:  [x] Adequate for participation in SLP sessions  [] Reduced overall  [] Lethargic  [] Other:  Safety: [] No concerns at this time  [] Reduced insight into deficits  []  Reduced safety awareness [] Not following call light procedures  [] Unable to Assess  [x] Other:self monitoring/carryover  Swallowing Precautions: Compensatory Swallowing Strategies: Upright as possible for all oral intake; Small bites/sips; Swallow 2 times per bite/sip; Remain upright for 30-45 minutes after meals     Barriers toward progress: none unless medical issues; memory for new learning/self monitoring and caregiver support           Date: 11/26/2021      Tx session 1 Tx session 2   Total Timed Code Min SLP Individual Minutes  Time In: 1115  Time Out: 1200  Minutes: 39  Coded treatment time 0  n/a     Group Treatment Minutes 0 0   Co-Treat Minutes 0 0   Variance/Reason:  n/a    Pain denied    Pain Intervention [] RN notified  [] Repositioned  [] Intervention offered and patient declined  [] N/A  [] Other: [] RN notified  [] Repositioned  [] Intervention offered and patient declined  [] N/A  [] Other:   Subjective     Repeat MBS completed, see report for findings/recommendations    Objective:  Goals     Dysphagia Goals: Pt goal is to eat wihtout choking or it getting stuck Therapy working toward pt goal. Pt reports good toleration, no coughing spells and overall feeling of improved swallow function; discussed MBS results and recommendations for diet with pt/sister    1.  Modified Goal  Pt will tolerate dysphagia puree food consistencies with mild thick liquids with compensatory swallow strategies GOAL MET    Repeat MBS 11/24 recommend continue puree/upgrade to thin liquids with CSTs      2. Pt will demonstrate improved swallow response post tongue base retraction and laryngeal elevation ex and use of effortful swallow technique\" 10/10;     GOAL MET    Recommend continue home ST for dysphagia (BOT retraction exs, yawn/hold, falsetto sustain high note, effortful swallow, glottals, lingual coordination)        3. Modified Goal   Pt will tolerate trials of minced and moist/mech soft  food consistencies OR thin liquids 10/10 without overt clinical s/s of aspiration  PARTIALLY MET    Upgrade to thin liquids, small sips, dry swallow  Trials of minced/moist with SLP only           4. The patient/caregiver will demonstrate understanding of compensatory strategies for improved swallowing safety. PARTIALLY MET    IND for rationale of clearance swallows between one presentation; written cues for reminders; min cues for execution with PO         Other areas targeted: Cognition:   Pt IND verbalizing need for CSTs; but min cues for execution/consistent carryover    Benefits from supervision/vanishing cues    Ongoing ed in s/s of aspiration and cues for airway clearance strategies. Education:   Results/recommendations reviewed from repeat MBS (with pt and sister)    Safety Devices: [x] Call light within reach  [x] Chair alarm activated  [] Bed alarm activated  [] Other: transport returned to room [] Call light within reach  [] Chair alarm activated  [] Bed alarm activated  [] Other:    Progress Assessment: 11/17/2021: Overall appears improved as compared to 11/16/2021 and diet modification appears to be a more functional one for pt. However; pt with 2 epiosdes of coughin/choking and both occurred when distracted.   Discussed with MD and recommend MBSS to furtehr assess pahrygneal phase of the swallow  11/18/2021 MBSS Oropharyngeal Dysphagia characterized by prolonged, at times incomplete mastication; delayed initiation of swallow; reduced laryngeal elevation with reduced epiglottic distention and hyolaryngeal excursion and reduced pharyngeal peristalsis with delayed UES opening and reduced UES opening with potential CP dysfunction or post Surgery edema with anterior bulging which may be impacting laryngeal rom and UES opening. Please refer to Radiologist documentation. Pt with penetration before/during swallow of thin liquids. Pt with pyriform sinus residual post swallow all consistencies with penetration post swallow. Pt with sensation of deep penetration with cough/throat clearing. Post swallow food residue in the valleculae and the pyriform sinus with difficulty clearing and persistent penetration despite attempts post swallow. Head postures were not attempted due to post cervical surgery precautions/restrictions. Recommend further downgrade to dysphagia puree with mildly thick liquids with close monitor due to concern for poor pharyngeal clearance with penetration post swallow of residue and risk of aspiration. Pt does need to complete clearance swallows between presentations. If s/s persist pt may require further downgrade FROM puree TO moderately thick liquids and continued mildly thick liquids. Crush all meds if able due to pharyngeal residue in the valleculae and pyriform sinus post swallow and risk of penetration/aspiration  11/18 SLP ed with RN; pt; pt's Sister chase; and discussed with MD (Dr. Carlito Stephens)  11/23: tolerated trials of minced/moist with nectar, and trials of thin water in isolation, without overt s/s when executing strategies. Apparent improved swallow function with small sample; rec consideration for repeat MBS prior to DC home to determine if diet/liquids may be safely upgraded  11/24: Repeat MBS: Persistent yet mildly improved oropharyngeal dysphagia in comparison to 11/18 MBSS.  Dysphagia characterized by slightly prolonged mastication with decreased lingual manipulation, decreased bolus control with textured solids, decreased AP bolus transit, mild delayed swallow initiation, decreased laryngeal elevation, inconsistent reduced epiglottic distention, reduced pharyngeal peristalsis. Persistent (but suspected as mildly improved) delayed and reduced UES opening with potential CP dysfunction or post surgery edema with anterior bulging which may be impacting laryngeal rom and UES opening. · Premature loss to valleculae; episodic to pyriform with thin cup. No pooling noted  · Flash penetration of thin liquids during swallow via cup  · Trace to mild vallecular residue; mild pharyngeal wall residue/lining; mild to mod residue above CP.   Residue more noted with heavier viscosities (mod-severe vallecular with soft bread)  · Multiple dry swallows and liquid wash assist in reducing residue, but inconsistent with full clearance  · NO penetration of residual material, as noted on previous MBS     Plan: DC home 11/25; continue home dysphagia tx   Continued Tx Upon Discharge: ?    [x] Yes [] No [] TBD based on progress while on ARU [] Vital Stim indicated [] Other:   Estimated discharge date: 11/25   Discharge recommendations:   [] Home independently  [x] Home with assistance []  24 hour supervision  [] ECF [] Other:     Additional information:     Interventions used during Rehab Stay:  [] Speech/Language Treatment  [] Instruction in HEP [] Group [x] Dysphagia Treatment [x] Cognitive Treatment  For dysphagia ex and for compensatory swallow strategies/self monitoring [] Other:      Electronically Signed by    Lesly Boyer MS, CCC-SLP #[id number]  Speech Language Pathologist

## 2021-11-27 NOTE — DISCHARGE SUMMARY
Physical Medicine & Rehabilitation  Discharge Summary     Patient Identification:  Karmen Villa  : 1950  Admit date: 2021  Discharge date: 2021   Attending provider: Ren Radford        Primary care provider: Kylie Eubanks MD     Discharge Diagnoses:   Patient Active Problem List   Diagnosis    Generalized weakness    Stenosis of cervical spine with myelopathy ([facility] [address]       History of Present Illness/Acute Hospital Course:  72-year-old female patient with cerebral palsy with recent history of increasing debility, inability to walk, generalized weakness. Work-up revealed the patient had severe cervical spinal cord compression due to large disc herniation and osteophytes at the C4-5 level.  Patient was taken to surgery on 11/10 where she underwent anterior cervical discectomy with fusion and fixation of C4-5 per Dr. Coreen Parada.       Inpatient Rehabilitation Course:   Karmen Villa is a 73 y.o. female admitted to inpatient rehabilitation on 2021 with Stenosis of cervical spine with myelopathy ([facility] [address] The patient participated in an aggressive multidisciplinary inpatient rehabilitation program involving 3 hours of therapy per day, at least 5 days per week. She made good progress with regard to steength, balance, coordination, compensatory strategies, ADLs. Remains limited by baseline left hemiparesis in setting of cerebral palsy. Now overall modified independent to supervision. Discharging home with sister. Home care services and DME ordered as below. Patient will follow-up with PCP, Neurosurgery, PM&R. Medical Management:  Cervical disc herniation with myelopathy  -s/p urgent C4-5 ACDF (11/10 with Dr. Coreen Parada)  -Incision healing well without evidence of infection  -PT/OT     Dysphagia   -Suspect due to ACDF/intubation  -SLP consulted  -s/p MBS () - D/w SLP. Significant prevertebral soft tissue swelling.  Down graded to pureed + nectars.  ---D/w Nsgy team, do not feel benefits of steroid would outweigh potential risks. -s/p [facility] (11/24) - upgraded to pureed + thins     Cerebral palsy  -Baseline left hemiparesis  -PT/OT     HLD  -fenofibrate     Depression  -citalopram, duloxetine, risperidone     Pain control  -methocarbamol, not requiring oxycodone     Discharge Exam:  Const: Alert. No distress, pleasant. HEENT: Normocephalic, atraumatic. Normal sclera/conjunctiva. MMM. +Strabismus. CV: Regular rate and rhythm. Resp: No respiratory distress. Lungs CTAB. Abd: Soft, nontender, nondistended, NABS+   Ext: No edema. Neuro: Alert, oriented. Left hemiparesis stable  Psych: Cooperative, appropriate mood and affect      Discharge Functional Status:    Physical therapy:  Bed Mobility: Scooting: Modified independent  Transfers: Sit to Stand: Modified independent  Stand to sit: Modified independent  Bed to Chair: Modified independent (with wheeled walker)  Comment: PT assisted with transfer in and out of [facility] chair. She did require min assist for this secondary to hieght of chair. , Ambulation 1  Surface: level tile  Device: Rolling Walker  Assistance: Modified Independent  Quality of Gait: step through pattern with reduced step length bilaterally, reduced bilateral foot clearance but is able to clear in swing phase (reduces with increased fatigue). Internal rotation of Left LE (L>R), which is chronic in nature  Gait Deviations: Slow Patsy, Decreased step length, Decreased step height  Distance: 54' x2 with two turns  Comments: Sister, Houston Qureshi, reports that she purchased a reacher for home use. PT reviewed possibility of placing velcro on the walker to allow for reacher to be with her. PT hung reacher on the walker and patient was then able to retrieve a small object from the floor with modified independece. , Stairs  # Steps : 12  Stairs Height: 6\"  Rails: Right ascending  Curbs: 6\" (Patient requires set up assist for the walker on the step, but is then able to step up and down the step without assistance)  Device: No Device  Assistance: Modified independent   Comment: Patient used bilateral hands on right rail ascending with non-reciprocal pattern. She used slow pace but continues to demonstrate improved ability to lift left LE up onto each step. She has difficulty positioning her feet on each step at times, and needs to reposition the feet before ascending or descending the next step. This is happening much less frequently than previous attempts. Sister, Sahra Puga, reports that the stairs look better than they have in a long time. Mobility:  , PT Equipment Recommendations  Equipment Needed: No  Other: owns wheeled walker, may have family purchase bedrail to improve independence with bed mobility, Assessment: Ms. Shiela Valdez is being discharged to home tomorrow. Sister, Sahra Puga, attended therapy this AM and agreed that patient is functioning much better now. She does report some concern, but has no specific concerns to discuss with PT. Patient met all goals while on rehab and is bale to complete bed mobility with modified independence using a bed rail which sister plans to buy. She also is modified independent with transfers and ambulation household distances with wheeled walker. Monica Recinos is able to complete a full flight of stairs with one rail with modified independence. She is safe to return home with PRN assistance from family and home PT. Occupational therapy: Eatin - Feeds self with setup/supervision/cues and/or requires only setup/supervision/cues to perform tube feedings,  , Assessment: Today pt bathed set-up to shower on chair, dressed w/ min A for sabrina hose, toileted mod ind all with use of RW, grab bar in shower and AE for LB dressing. Pt performed ADL transfers all mod ind from RW. Does have abnormal gait & is fall risk which was pre-morbid for pt.  Pt will benefit from 1 more OT session today & cont'd OT post D/C + home health aid as rec supervision for showering, as shower in basement. Pt has made excellent progress on rehab & is ready for D/C w/ sister's assist for IADL tasks & intermittent supervision for ADL tasks. Plan to cont tx per POC.     Speech therapy:  Comprehension: 6 - Complex ideas 90% or device (hearing aid or glasses- if patient is primarily a visual learner)  Expression: 5 - Expresses basic ideas/needs only (hungry/hot/pain)  Social Interaction: 7 - Patient has appropriate behavior/relations 100% of the time  Problem Solvin - Patient solves simple/routine tasks 75-90%+       Significant Diagnostics:   Lab Results   Component Value Date    CREATININE 0.7 2021    BUN 22 (H) 2021     2021    K 4.0 2021     2021    CO2 25 2021       Lab Results   Component Value Date    WBC 7.7 2021    HGB 11.0 (L) 2021    HCT 33.3 (L) 2021    MCV 90.2 2021     2021       Disposition:  Home with sister  Services:  PT, OT, SLP, RN, Aide  DME: hospitals    Discharge Condition: Stable    Follow-up:  See after visit summary from hospitalization    Discharge Medications:     Medication List      CHANGE how you take these medications    methocarbamol 500 MG tablet  Commonly known as: ROBAXIN  Take 1 tablet by mouth 3 times daily as needed (muscle spasms)  What changed:   · medication strength  · how much to take  · when to take this  · reasons to take this        CONTINUE taking these medications    aluminum & magnesium hydroxide-simethicone 200-200-20 MG/5ML Susp suspension  Commonly known as: MAALOX  Take 15 mLs by mouth every 6 hours as needed for Indigestion     citalopram 20 MG tablet  Commonly known as: CELEXA     docusate sodium 100 MG capsule  Commonly known as: COLACE     DULoxetine 60 MG extended release capsule  Commonly known as: CYMBALTA     fenofibric acid 135 MG Cpdr capsule  Commonly known as: FIBRICOR     oxybutynin 5 MG tablet  Commonly known as: DITROPAN     risperiDONE 1 MG tablet  Commonly known as: RISPERDAL     therapeutic multivitamin-minerals tablet        STOP taking these medications    oxyCODONE 5 MG immediate release tablet  Commonly known as: Remstephenie Flores           Where to Get Your Medications      These medications were sent to [address] [place] [address] , [address] [place] [address] & [facility]  [address] [facility], [address] [facility]    Phone: [phone]   · methocarbamol 500 MG tablet           I spent over 35 minutes on this discharge encounter between counseling, coordination of care, and medication reconciliation. To comply with [facility] bylaw R.II.4.1:   Discharge order placed in advance to facilitate patients discharge needs.       Polina Ritchie MD

## 2021-12-09 ENCOUNTER — HOSPITAL ENCOUNTER (OUTPATIENT)
Age: 71
Discharge: HOME | End: 2021-12-09
Payer: COMMERCIAL

## 2021-12-09 ENCOUNTER — HOSPITAL ENCOUNTER (OUTPATIENT)
Dept: GENERAL RADIOLOGY | Age: 71
Discharge: HOME | End: 2021-12-09
Payer: COMMERCIAL

## 2021-12-09 DIAGNOSIS — G95.9 CERVICAL MYELOPATHY (HCC): ICD-10-CM

## 2021-12-09 PROCEDURE — 72040 X-RAY EXAM NECK SPINE 2-3 VW: CPT

## 2022-01-19 ENCOUNTER — HOSPITAL ENCOUNTER (OUTPATIENT)
Age: 72
Discharge: HOME | End: 2022-01-19
Payer: COMMERCIAL

## 2022-01-19 ENCOUNTER — HOSPITAL ENCOUNTER (OUTPATIENT)
Dept: GENERAL RADIOLOGY | Age: 72
Discharge: HOME | End: 2022-01-19
Payer: COMMERCIAL

## 2022-01-19 DIAGNOSIS — M50.021 CERVICAL DISC DISORDER AT C4-C5 LEVEL WITH MYELOPATHY: ICD-10-CM

## 2022-01-19 PROCEDURE — 72040 X-RAY EXAM NECK SPINE 2-3 VW: CPT

## 2022-04-22 ENCOUNTER — HOSPITAL ENCOUNTER (OUTPATIENT)
Age: 72
Discharge: HOME | End: 2022-04-22
Payer: COMMERCIAL

## 2022-04-22 ENCOUNTER — HOSPITAL ENCOUNTER (OUTPATIENT)
Dept: GENERAL RADIOLOGY | Age: 72
Discharge: HOME | End: 2022-04-22
Payer: COMMERCIAL

## 2022-04-22 DIAGNOSIS — M50.021 CERVICAL DISC DISORDER AT C4-C5 LEVEL WITH MYELOPATHY: ICD-10-CM

## 2022-04-22 PROCEDURE — 72040 X-RAY EXAM NECK SPINE 2-3 VW: CPT

## 2024-02-08 NOTE — CONSULTS
Nutrition Assessment     Type and Reason for Visit: Initial, Consult    Nutrition Recommendations/Plan:   Continue regular diet     Nutrition Assessment:  RD consult for new admission to rehab. Pt with severe cervical spinal cord compression with myelopathy and underwent decompression surgery 11/10. No wt loss over the past six months. On regular diet with intakes >50% at all meals. No nutrition concerns at this time. Malnutrition Assessment:  Malnutrition Status: No malnutrition     Nutrition Related Findings: +BM 11/9. No edema. Current Nutrition Therapies:    ADULT DIET;  Regular    Anthropometric Measures:  · Height: 5' (152.4 cm)  · Current Body Wt: 162 lb (73.5 kg)   · BMI: 31.6    Nutrition Diagnosis:   No nutrition diagnosis at this time    Nutrition Interventions:   Food and/or Nutrient Delivery:  Continue Current Diet  Nutrition Education/Counseling:  Education not indicated   Coordination of Nutrition Care:  Continue to monitor while inpatient    Goals:  PO intake greater than 50%       Nutrition Monitoring and Evaluation:   Behavioral-Environmental Outcomes:  None Identified   Food/Nutrient Intake Outcomes:  Food and Nutrient Intake  Physical Signs/Symptoms Outcomes:  Skin, Weight     Discharge Planning:    No discharge needs at this time     Electronically signed by Hansel Dalal RD, CALE on 11/13/21 at 9:55 AM EST    Contact: [phone] Imaging Studies/Medications

## (undated) DEVICE — ANTISEPTIC 1OZ POVIDONE IOD SOL PKT

## (undated) DEVICE — Z DUP USE 2684811 BIT DRL OD2.3MM S STL FOR TRINICA SYS

## (undated) DEVICE — DRAPE,MINOR PROC,6X6 FEN, STER: Brand: MEDLINE

## (undated) DEVICE — INTENDED FOR TISSUE SEPARATION, AND OTHER PROCEDURES THAT REQUIRE A SHARP SURGICAL BLADE TO PUNCTURE OR CUT.: Brand: BARD-PARKER ® STAINLESS STEEL BLADES

## (undated) DEVICE — GOWN SIRUS NONREIN LG W/TWL: Brand: MEDLINE INDUSTRIES, INC.

## (undated) DEVICE — 3M™ TEGADERM™ TRANSPARENT FILM DRESSING FRAME STYLE, 1626W, 4 IN X 4-3/4 IN (10 CM X 12 CM), 50/CT 4CT/CASE: Brand: 3M™ TEGADERM™

## (undated) DEVICE — GAUZE,SPONGE,4"X4",16PLY,XRAY,STRL,LF: Brand: MEDLINE

## (undated) DEVICE — NEEDLE SPNL L3.5IN PNK HUB S STL REG WALL FIT STYL W/ QNCKE

## (undated) DEVICE — Device

## (undated) DEVICE — SURGICAL SUCTION CONNECTING TUBE WITH MALE CONNECTOR AND SUCTION CLAMP, 2 FT. LONG (.6 M), 5 MM I.D.: Brand: CONMED

## (undated) DEVICE — UNDERGLOVE SURG SZ 8 FNGR THK0.21MIL GRN LTX BEAD CUF

## (undated) DEVICE — TOOL 14CY50 LEGEND 14CM 5MM CY: Brand: MIDAS REX ™

## (undated) DEVICE — SOLUTION PREP POVIDONE IOD FOR SKIN MUCOUS MEM PRIOR TO

## (undated) DEVICE — ELECTRODE PT RET AD L9FT HI MOIST COND ADH HYDRGEL CORDED

## (undated) DEVICE — SPONGE,DISSECTOR,K,XRAY,9/16"X1/4",STRL: Brand: MEDLINE

## (undated) DEVICE — BLADE ES ELASTOMERIC COAT INSUL DURABLE BEND UPTO 90DEG

## (undated) DEVICE — CODMAN® SURGICAL PATTIES 1" X 1" (2.54CM X 2.54CM): Brand: CODMAN®

## (undated) DEVICE — CANISTER, RIGID, 1200CC: Brand: MEDLINE INDUSTRIES, INC.

## (undated) DEVICE — SOLUTION IV IRRIG POUR BRL 0.9% SODIUM CHL 2F7124

## (undated) DEVICE — GOWN,AURORA,NONREINF,RAGLAN,XXL,STERILE: Brand: MEDLINE

## (undated) DEVICE — SYRINGE IRRIG 60ML SFT PLIABLE BLB EZ TO GRP 1 HND USE W/

## (undated) DEVICE — SOLUTION SCRB 4OZ 10% POVIDONE IOD ANTIMIC BTL

## (undated) DEVICE — SUTURE VCRL SZ 4-0 L18IN ABSRB UD L19MM PS-2 3/8 CIR PRIM J496H

## (undated) DEVICE — Z DISCONTINUED BY MEDLINE USE 2711682 TRAY SKIN PREP DRY W/ PREM GLV

## (undated) DEVICE — 3M™ IOBAN™ 2 ANTIMICROBIAL INCISE DRAPE 6640EZ: Brand: IOBAN™ 2

## (undated) DEVICE — MICRO KOVER: Brand: UNBRANDED

## (undated) DEVICE — NEEDLE HYPO 25GA L1.5IN BVL ORIENTED ECLIPSE

## (undated) DEVICE — C-ARM: Brand: UNBRANDED

## (undated) DEVICE — BANDAGE HEMCON 2X2

## (undated) DEVICE — TOOL 14MH30 LEGEND 14CM 3MM: Brand: MIDAS REX ™

## (undated) DEVICE — SUTURE VCRL SZ 2-0 L27IN ABSRB UD L26MM SH 1/2 CIR J417H

## (undated) DEVICE — COVER,TABLE,77X90,STERILE: Brand: MEDLINE

## (undated) DEVICE — FLOSEAL HEMOSTATIC MATRIX, 5 ML: Brand: FLOSEAL

## (undated) DEVICE — COVER,TABLE,44X90,STERILE: Brand: MEDLINE

## (undated) DEVICE — TUBING, SUCTION, 1/4" X 12', STRAIGHT: Brand: MEDLINE

## (undated) DEVICE — MERCY HEALTH WEST TURNOVER: Brand: MEDLINE INDUSTRIES, INC.

## (undated) DEVICE — TOWEL,OR,DSP,ST,BLUE,STD,4/PK,20PK/CS: Brand: MEDLINE

## (undated) DEVICE — SYRINGE 20ML LL S/C 50

## (undated) DEVICE — GLOVE SURG SZ 8 CRM LTX FREE POLYISOPRENE POLYMER BEAD ANTI

## (undated) DEVICE — COVER LT HNDL BLU PLAS

## (undated) DEVICE — DRAPE,INSTRUMENT,MAGNETIC,10X16: Brand: MEDLINE

## (undated) DEVICE — 1016 S-DRAPE IRRIG POUCH 10/BOX: Brand: STERI-DRAPE™

## (undated) DEVICE — SYRINGE MED 30ML STD CLR PLAS LUERLOCK TIP N CTRL DISP